# Patient Record
Sex: FEMALE | Race: WHITE | NOT HISPANIC OR LATINO | Employment: OTHER | URBAN - METROPOLITAN AREA
[De-identification: names, ages, dates, MRNs, and addresses within clinical notes are randomized per-mention and may not be internally consistent; named-entity substitution may affect disease eponyms.]

---

## 2017-03-03 ENCOUNTER — OFFICE VISIT (OUTPATIENT)
Dept: FAMILY MEDICINE CLINIC | Facility: CLINIC | Age: 67
End: 2017-03-03

## 2017-03-03 VITALS
HEIGHT: 61 IN | OXYGEN SATURATION: 98 % | HEART RATE: 65 BPM | SYSTOLIC BLOOD PRESSURE: 118 MMHG | BODY MASS INDEX: 41.16 KG/M2 | WEIGHT: 218 LBS | DIASTOLIC BLOOD PRESSURE: 72 MMHG | TEMPERATURE: 98.3 F

## 2017-03-03 DIAGNOSIS — M15.9 GENERALIZED OSTEOARTHROSIS, INVOLVING MULTIPLE SITES: ICD-10-CM

## 2017-03-03 DIAGNOSIS — K21.9 GASTROESOPHAGEAL REFLUX DISEASE, ESOPHAGITIS PRESENCE NOT SPECIFIED: ICD-10-CM

## 2017-03-03 DIAGNOSIS — M54.41 CHRONIC BILATERAL LOW BACK PAIN WITH BILATERAL SCIATICA: Primary | ICD-10-CM

## 2017-03-03 DIAGNOSIS — M54.42 CHRONIC BILATERAL LOW BACK PAIN WITH BILATERAL SCIATICA: Primary | ICD-10-CM

## 2017-03-03 DIAGNOSIS — G89.29 CHRONIC BILATERAL LOW BACK PAIN WITH BILATERAL SCIATICA: Primary | ICD-10-CM

## 2017-03-03 DIAGNOSIS — Z71.84 TRAVEL ADVICE ENCOUNTER: ICD-10-CM

## 2017-03-03 PROCEDURE — 99213 OFFICE O/P EST LOW 20 MIN: CPT | Performed by: FAMILY MEDICINE

## 2017-03-03 RX ORDER — LIDOCAINE 50 MG/G
1 PATCH TOPICAL EVERY 24 HOURS
Qty: 90 PATCH | Refills: 3 | Status: SHIPPED | OUTPATIENT
Start: 2017-03-03 | End: 2017-08-10 | Stop reason: SDUPTHER

## 2017-03-03 RX ORDER — SCOLOPAMINE TRANSDERMAL SYSTEM 1 MG/1
1 PATCH, EXTENDED RELEASE TRANSDERMAL
Qty: 4 PATCH | Refills: 0 | Status: SHIPPED | OUTPATIENT
Start: 2017-03-03 | End: 2019-04-18

## 2017-03-03 RX ORDER — TIZANIDINE HYDROCHLORIDE 2 MG/1
2 CAPSULE, GELATIN COATED ORAL 3 TIMES DAILY
Qty: 270 CAPSULE | Refills: 3 | Status: SHIPPED | OUTPATIENT
Start: 2017-03-03 | End: 2018-03-03

## 2017-03-03 RX ORDER — METHYLPREDNISOLONE 4 MG/1
TABLET ORAL
Qty: 21 TABLET | Refills: 0 | Status: SHIPPED | OUTPATIENT
Start: 2017-03-03 | End: 2017-05-05 | Stop reason: SDUPTHER

## 2017-03-03 RX ORDER — TRAMADOL HYDROCHLORIDE 50 MG/1
50 TABLET ORAL 2 TIMES DAILY
Qty: 180 TABLET | Refills: 1 | Status: SHIPPED | OUTPATIENT
Start: 2017-03-03 | End: 2017-03-06 | Stop reason: SDUPTHER

## 2017-03-03 RX ORDER — INFLUENZA A VIRUS A/MICHIGAN/45/2015 X-275 (H1N1) ANTIGEN (FORMALDEHYDE INACTIVATED), INFLUENZA A VIRUS A/SINGAPORE/INFIMH-16-0019/2016 IVR-186 (H3N2) ANTIGEN (FORMALDEHYDE INACTIVATED), AND INFLUENZA B VIRUS B/MARYLAND/15/2016 BX-69A (A B/COLORADO/6/2017-LIKE VIRUS) ANTIGEN (FORMALDEHYDE INACTIVATED) 60; 60; 60 UG/.5ML; UG/.5ML; UG/.5ML
INJECTION, SUSPENSION INTRAMUSCULAR
Refills: 0 | COMMUNITY
Start: 2016-12-23 | End: 2019-04-18

## 2017-03-03 RX ORDER — PNEUMOCOCCAL 13-VALENT CONJUGATE VACCINE 2.2; 2.2; 2.2; 2.2; 2.2; 4.4; 2.2; 2.2; 2.2; 2.2; 2.2; 2.2; 2.2 UG/.5ML; UG/.5ML; UG/.5ML; UG/.5ML; UG/.5ML; UG/.5ML; UG/.5ML; UG/.5ML; UG/.5ML; UG/.5ML; UG/.5ML; UG/.5ML; UG/.5ML
INJECTION, SUSPENSION INTRAMUSCULAR
Refills: 0 | COMMUNITY
Start: 2017-02-03 | End: 2019-04-18

## 2017-03-03 RX ORDER — TRAMADOL HYDROCHLORIDE 50 MG/1
50 TABLET ORAL 2 TIMES DAILY
Qty: 180 TABLET | Refills: 1 | Status: SHIPPED | OUTPATIENT
Start: 2017-03-03 | End: 2017-03-03 | Stop reason: SDUPTHER

## 2017-03-03 RX ORDER — MECLIZINE HYDROCHLORIDE 25 MG/1
25 TABLET ORAL 3 TIMES DAILY PRN
Qty: 30 TABLET | Refills: 0 | Status: SHIPPED | OUTPATIENT
Start: 2017-03-03 | End: 2017-03-15 | Stop reason: SDUPTHER

## 2017-03-03 NOTE — PROGRESS NOTES
Subjective   Casandra Trinidad is a 66 y.o. female    HPI Comments: Patient is here for recheck regarding her chronic leg and back pain.  She has lumbar spondylosis of the spine and severe degenerative arthritis of her knee.  She needs refill on her medications.  She is very frugal and sensitive to using medication.  She is aware that she is nearing end-stage with her knee and will require a knee replacement.    She is leaving on a cruise in a few weeks and wanted to get medicine for travel/motion sickness and wanted to have a Medrol Dosepak in case her sciatica flares up.    Leg Pain    Pertinent negatives include no numbness.   Dizziness   Associated symptoms include arthralgias. Pertinent negatives include no myalgias, numbness or weakness.       The following portions of the patient's history were reviewed and updated as appropriate: allergies, current medications, past social history and problem list    Review of Systems   Constitutional: Negative.    HENT: Negative.    Eyes: Negative.    Respiratory: Negative.    Cardiovascular: Negative.    Gastrointestinal: Negative.    Musculoskeletal: Positive for arthralgias, back pain and gait problem. Negative for myalgias.   Neurological: Positive for dizziness. Negative for tremors, weakness and numbness.   Psychiatric/Behavioral: Negative for behavioral problems and dysphoric mood. The patient is not nervous/anxious.        Objective     Vitals:    03/03/17 1501   BP: 118/72   Pulse: 65   Temp: 98.3 °F (36.8 °C)   SpO2: 98%       Physical Exam   Constitutional: She is oriented to person, place, and time. She appears well-developed.   obese   HENT:   Head: Normocephalic and atraumatic.   Eyes: Conjunctivae are normal. Pupils are equal, round, and reactive to light.   Cardiovascular: Normal rate and regular rhythm.    Pulmonary/Chest: Effort normal and breath sounds normal.   Abdominal: Soft. Bowel sounds are normal.   Musculoskeletal:        Right knee: She exhibits  decreased range of motion and deformity.        Lumbar back: She exhibits decreased range of motion, tenderness, bony tenderness and pain. She exhibits no swelling, no deformity and no spasm.   Marked knee crepitus   Neurological: She is alert and oriented to person, place, and time. She has normal reflexes.   Skin: Skin is warm and dry.   Psychiatric: She has a normal mood and affect. Her behavior is normal.   Nursing note and vitals reviewed.      Assessment/Plan   Problem List Items Addressed This Visit        Digestive    Esophageal reflux       Musculoskeletal and Integument    Generalized osteoarthrosis, involving multiple sites      Other Visit Diagnoses     Chronic bilateral low back pain with bilateral sciatica    -  Primary    Relevant Medications    traMADol (ULTRAM) 50 MG tablet    TiZANidine (ZANAFLEX) 2 MG capsule    lidocaine (LIDODERM) 5 %    Travel advice encounter        Relevant Medications    Scopolamine (TRANSDERM-SCOP) 1.5 MG/3DAYS patch    meclizine (ANTIVERT) 25 MG tablet    MethylPREDNISolone (MEDROL, KARLI,) 4 MG tablet

## 2017-03-06 DIAGNOSIS — M54.42 CHRONIC BILATERAL LOW BACK PAIN WITH BILATERAL SCIATICA: ICD-10-CM

## 2017-03-06 DIAGNOSIS — G89.29 CHRONIC BILATERAL LOW BACK PAIN WITH BILATERAL SCIATICA: ICD-10-CM

## 2017-03-06 DIAGNOSIS — M54.41 CHRONIC BILATERAL LOW BACK PAIN WITH BILATERAL SCIATICA: ICD-10-CM

## 2017-03-06 RX ORDER — TRAMADOL HYDROCHLORIDE 50 MG/1
50 TABLET ORAL 2 TIMES DAILY
Qty: 180 TABLET | Refills: 1 | Status: SHIPPED | OUTPATIENT
Start: 2017-03-06 | End: 2017-08-10 | Stop reason: SDUPTHER

## 2017-03-13 ENCOUNTER — TELEPHONE (OUTPATIENT)
Dept: FAMILY MEDICINE CLINIC | Facility: CLINIC | Age: 67
End: 2017-03-13

## 2017-03-13 NOTE — TELEPHONE ENCOUNTER
----- Message from Judi Valdez sent at 3/13/2017  8:37 AM EDT -----  Contact: PT.  PT. SAW DR. ARROYO OVER 1 WEEK AGO.  MEDICATION ?:  PT. WAS IN FOR HER 6 MTH. F/U @ LAST APPT.  REFILL NEEDING OF:  TRAMADOL, (PT. DID NOT KNOW HER MG.), 2/DAY, #90 DAY SUPPLY; W/1 REFILL.  MAIL ORDER THRU RX:  [facility].  THIS WAS JUST FORGOTTON TO BE ADDED WHEN ALL REFILLS WERE SUBMITTED.  PT. CAN BE REACHED @ CELL PHONE #: [phone].

## 2017-03-15 ENCOUNTER — TELEPHONE (OUTPATIENT)
Dept: FAMILY MEDICINE CLINIC | Facility: CLINIC | Age: 67
End: 2017-03-15

## 2017-03-15 DIAGNOSIS — Z71.84 TRAVEL ADVICE ENCOUNTER: ICD-10-CM

## 2017-03-15 RX ORDER — MECLIZINE HYDROCHLORIDE 25 MG/1
25 TABLET ORAL 3 TIMES DAILY PRN
Qty: 90 TABLET | Refills: 0 | Status: SHIPPED | OUTPATIENT
Start: 2017-03-15 | End: 2017-05-05 | Stop reason: SDUPTHER

## 2017-03-15 NOTE — TELEPHONE ENCOUNTER
----- Message from Mayuri Riggs sent at 3/15/2017 10:00 AM EDT -----    Pt saw Dr. CORONEL a couple weeks ago.    He tried her on meclizine for 2 weeks and it's really worked well.  She's wanting to know if she can get a new rx sent to her mail order pharm?  90 day supply please.  thanks

## 2017-05-05 DIAGNOSIS — Z71.84 TRAVEL ADVICE ENCOUNTER: ICD-10-CM

## 2017-05-05 RX ORDER — MECLIZINE HYDROCHLORIDE 25 MG/1
TABLET ORAL
Qty: 90 TABLET | Refills: 0 | Status: SHIPPED | OUTPATIENT
Start: 2017-05-05 | End: 2017-05-11 | Stop reason: SDUPTHER

## 2017-05-05 RX ORDER — METHYLPREDNISOLONE 4 MG/1
TABLET ORAL
Qty: 21 TABLET | Refills: 0 | Status: SHIPPED | OUTPATIENT
Start: 2017-05-05 | End: 2017-11-30

## 2017-05-11 DIAGNOSIS — Z71.84 TRAVEL ADVICE ENCOUNTER: ICD-10-CM

## 2017-05-11 RX ORDER — MECLIZINE HYDROCHLORIDE 25 MG/1
25 TABLET ORAL 3 TIMES DAILY PRN
Qty: 90 TABLET | Refills: 0 | Status: SHIPPED | OUTPATIENT
Start: 2017-05-11 | End: 2017-08-10 | Stop reason: SDUPTHER

## 2017-05-12 ENCOUNTER — TELEPHONE (OUTPATIENT)
Dept: FAMILY MEDICINE CLINIC | Facility: CLINIC | Age: 67
End: 2017-05-12

## 2017-06-03 DIAGNOSIS — Z71.84 TRAVEL ADVICE ENCOUNTER: ICD-10-CM

## 2017-06-06 RX ORDER — MECLIZINE HYDROCHLORIDE 25 MG/1
TABLET ORAL
Qty: 90 TABLET | OUTPATIENT
Start: 2017-06-06

## 2017-06-06 RX ORDER — METHYLPREDNISOLONE 4 MG/1
TABLET ORAL
Qty: 21 TABLET | OUTPATIENT
Start: 2017-06-06

## 2017-08-10 ENCOUNTER — OFFICE VISIT (OUTPATIENT)
Dept: FAMILY MEDICINE CLINIC | Facility: CLINIC | Age: 67
End: 2017-08-10

## 2017-08-10 VITALS
TEMPERATURE: 98 F | HEART RATE: 66 BPM | SYSTOLIC BLOOD PRESSURE: 96 MMHG | HEIGHT: 61 IN | BODY MASS INDEX: 41.72 KG/M2 | OXYGEN SATURATION: 97 % | DIASTOLIC BLOOD PRESSURE: 58 MMHG | WEIGHT: 221 LBS

## 2017-08-10 DIAGNOSIS — M54.41 CHRONIC BILATERAL LOW BACK PAIN WITH BILATERAL SCIATICA: Primary | ICD-10-CM

## 2017-08-10 DIAGNOSIS — F41.1 GAD (GENERALIZED ANXIETY DISORDER): ICD-10-CM

## 2017-08-10 DIAGNOSIS — G89.29 CHRONIC BILATERAL LOW BACK PAIN WITH BILATERAL SCIATICA: Primary | ICD-10-CM

## 2017-08-10 DIAGNOSIS — M15.9 GENERALIZED OSTEOARTHROSIS, INVOLVING MULTIPLE SITES: ICD-10-CM

## 2017-08-10 DIAGNOSIS — K21.9 GASTROESOPHAGEAL REFLUX DISEASE, ESOPHAGITIS PRESENCE NOT SPECIFIED: ICD-10-CM

## 2017-08-10 DIAGNOSIS — R42 DIZZINESS: ICD-10-CM

## 2017-08-10 DIAGNOSIS — M54.42 CHRONIC BILATERAL LOW BACK PAIN WITH BILATERAL SCIATICA: Primary | ICD-10-CM

## 2017-08-10 PROCEDURE — 99214 OFFICE O/P EST MOD 30 MIN: CPT | Performed by: FAMILY MEDICINE

## 2017-08-10 RX ORDER — MECLIZINE HYDROCHLORIDE 25 MG/1
25 TABLET ORAL 3 TIMES DAILY PRN
Qty: 90 TABLET | Refills: 3 | Status: SHIPPED | OUTPATIENT
Start: 2017-08-10 | End: 2018-09-05 | Stop reason: SDUPTHER

## 2017-08-10 RX ORDER — LIDOCAINE 50 MG/G
1 PATCH TOPICAL EVERY 24 HOURS
Qty: 90 PATCH | Refills: 3 | Status: SHIPPED | OUTPATIENT
Start: 2017-08-10 | End: 2018-02-26 | Stop reason: SDUPTHER

## 2017-08-10 RX ORDER — TRAMADOL HYDROCHLORIDE 50 MG/1
50 TABLET ORAL 2 TIMES DAILY
Qty: 180 TABLET | Refills: 1 | Status: SHIPPED | OUTPATIENT
Start: 2017-08-10 | End: 2017-08-17 | Stop reason: SDUPTHER

## 2017-08-11 NOTE — PROGRESS NOTES
Subjective   Casandra Trinidad is a 67 y.o. female    HPI Comments: Patient presents for 6 month recheck regarding chronic back pain associated with lumbar spondylosis and degenerative disc disease.  She is due for refill on her tramadol.  Her Timur is reviewed and is appropriate.  Also follow-up regarding generalized osteoarthritis, dizziness, anxiety and GERD.  She is requesting refills also for her meclizine and Lidoderm patches.  All of these prescriptions are sent to her mail-order pharmacy.  She denies any significant changes in her back pain pattern but reports that her pain seems to be more persistent.  She agrees that her weight issue is aggravating her back pain.  She denies any changes in bowel or bladder function.  She has no acute medical issues today.    Back Pain   Pertinent negatives include no abdominal pain, chest pain, dysuria, headaches, numbness or weakness.   Dizziness   Pertinent negatives include no abdominal pain, arthralgias, chest pain, fatigue, headaches, myalgias, nausea, numbness or weakness.   Arthritis   Pertinent negatives include no diarrhea, dysuria or fatigue.       The following portions of the patient's history were reviewed and updated as appropriate: allergies, current medications, past social history and problem list    Review of Systems   Constitutional: Negative.  Negative for appetite change, fatigue and unexpected weight change.   HENT: Negative.    Eyes: Negative.    Respiratory: Negative.  Negative for chest tightness and shortness of breath.    Cardiovascular: Negative for chest pain.   Gastrointestinal: Negative.  Negative for abdominal distention, abdominal pain, diarrhea and nausea.        Patient experiencing heartburn/acid reflux     Endocrine: Negative for polydipsia, polyphagia and polyuria.   Genitourinary: Negative for dysuria and hematuria.   Musculoskeletal: Positive for arthritis and back pain. Negative for arthralgias, gait problem and myalgias.   Skin:  Negative.    Neurological: Positive for dizziness. Negative for tremors, weakness, light-headedness, numbness and headaches.   Psychiatric/Behavioral: Positive for dysphoric mood and sleep disturbance. Negative for agitation, behavioral problems, confusion, decreased concentration and suicidal ideas. The patient is nervous/anxious.        Objective     Vitals:    08/10/17 0812   BP: 96/58   Pulse: 66   Temp: 98 °F (36.7 °C)   SpO2: 97%       Physical Exam   Constitutional: She is oriented to person, place, and time. She appears well-developed and well-nourished.   HENT:   Head: Normocephalic.   Mouth/Throat: Oropharynx is clear and moist.   Eyes: Conjunctivae are normal. Pupils are equal, round, and reactive to light.   Neck: Neck supple. No thyromegaly present.   Cardiovascular: Normal rate and regular rhythm.    Pulmonary/Chest: Effort normal and breath sounds normal.   Abdominal: Soft. Bowel sounds are normal. There is no tenderness.   Musculoskeletal:        Lumbar back: She exhibits decreased range of motion, tenderness, bony tenderness and pain. She exhibits no swelling, no deformity and no spasm.   Lymphadenopathy:     She has no cervical adenopathy.   Neurological: She is alert and oriented to person, place, and time. She has normal reflexes.   Skin: Skin is warm and dry. No rash noted.   Psychiatric: She has a normal mood and affect. Her behavior is normal.   Nursing note and vitals reviewed.      Assessment/Plan   Problem List Items Addressed This Visit        Digestive    Esophageal reflux       Musculoskeletal and Integument    Generalized osteoarthrosis, involving multiple sites       Other    CLAUDIA (generalized anxiety disorder)      Other Visit Diagnoses     Chronic bilateral low back pain with bilateral sciatica    -  Primary    Relevant Medications    traMADol (ULTRAM) 50 MG tablet    lidocaine (LIDODERM) 5 %    Dizziness        Relevant Medications    meclizine (ANTIVERT) 25 MG tablet

## 2017-08-17 ENCOUNTER — TELEPHONE (OUTPATIENT)
Dept: FAMILY MEDICINE CLINIC | Facility: CLINIC | Age: 67
End: 2017-08-17

## 2017-08-17 DIAGNOSIS — M54.42 CHRONIC BILATERAL LOW BACK PAIN WITH BILATERAL SCIATICA: ICD-10-CM

## 2017-08-17 DIAGNOSIS — G89.29 CHRONIC BILATERAL LOW BACK PAIN WITH BILATERAL SCIATICA: ICD-10-CM

## 2017-08-17 DIAGNOSIS — M54.41 CHRONIC BILATERAL LOW BACK PAIN WITH BILATERAL SCIATICA: ICD-10-CM

## 2017-08-17 RX ORDER — TRAMADOL HYDROCHLORIDE 50 MG/1
50 TABLET ORAL 2 TIMES DAILY
Qty: 60 TABLET | Refills: 0 | Status: SHIPPED | OUTPATIENT
Start: 2017-08-17 | End: 2018-02-26 | Stop reason: SDUPTHER

## 2017-08-17 NOTE — TELEPHONE ENCOUNTER
----- Message from Radha Mckeon sent at 8/17/2017 12:18 PM EDT -----  Contact: PATIENT   traMADol (ULTRAM) 50 MG tablet 180 tablet 1 8/10/2017      Sig - Route: Take 1 tablet by mouth 2 (Two) Times a Day. - Oral    THIS NEEDS TO BE CALLED TO [facility] PLEASE. SHE THOUGHT IT GOT SENT ON HER LAST VISIT, BUT THEY TOLD HER THEY DID NOT GET IT.     ALSO CAN HER DAUGHTER  A SCRIPT FOR HER TODAY, JUST ENOUGH TO SO HER FOR COUPLE WEEKS UNTIL THE OTHER IS DELIVERED BY [facility]. PLEASE CALL HER AND LET HER KNOW, DAUGHTER WORKS HERE [facility].

## 2017-08-17 NOTE — TELEPHONE ENCOUNTER
Prescription was given to Caridad to fax[facility] at patient's recent visit??  I will print a new prescription that her daughter can  for her today.

## 2017-08-21 RX ORDER — MELOXICAM 15 MG/1
TABLET ORAL
Qty: 90 TABLET | Refills: 2 | Status: SHIPPED | OUTPATIENT
Start: 2017-08-21 | End: 2018-05-18 | Stop reason: SDUPTHER

## 2017-08-24 ENCOUNTER — TELEPHONE (OUTPATIENT)
Dept: FAMILY MEDICINE CLINIC | Facility: CLINIC | Age: 67
End: 2017-08-24

## 2017-08-24 NOTE — TELEPHONE ENCOUNTER
----- Message from Judi Valdez sent at 8/23/2017  2:06 PM EDT -----  Contact: [facility]MATTHEW ARMENTA:  [phone]  MATTHEW FROM [facility] CALLED, RECEIVED A PRESCRIPTION FOR TRAMADOL, NEEDING TO BE SPOKEN TO VERBALLY FOR COMPLETING THIS MEDICATION REQUEST.  REF. #: [id number].  REACH MATTHEW @ ABOVE #.

## 2017-09-15 RX ORDER — RALOXIFENE HYDROCHLORIDE 60 MG/1
TABLET, FILM COATED ORAL
Qty: 90 TABLET | Refills: 3 | Status: SHIPPED | OUTPATIENT
Start: 2017-09-15 | End: 2019-03-04

## 2017-11-30 ENCOUNTER — OFFICE VISIT (OUTPATIENT)
Dept: FAMILY MEDICINE CLINIC | Facility: CLINIC | Age: 67
End: 2017-11-30

## 2017-11-30 ENCOUNTER — HOSPITAL ENCOUNTER (OUTPATIENT)
Dept: GENERAL RADIOLOGY | Facility: HOSPITAL | Age: 67
Discharge: HOME | End: 2017-11-30
Admitting: FAMILY MEDICINE

## 2017-11-30 VITALS
DIASTOLIC BLOOD PRESSURE: 78 MMHG | BODY MASS INDEX: 43.31 KG/M2 | HEIGHT: 61 IN | SYSTOLIC BLOOD PRESSURE: 122 MMHG | WEIGHT: 229.4 LBS | OXYGEN SATURATION: 98 % | HEART RATE: 75 BPM

## 2017-11-30 DIAGNOSIS — M54.41 CHRONIC BILATERAL LOW BACK PAIN WITH BILATERAL SCIATICA: ICD-10-CM

## 2017-11-30 DIAGNOSIS — Z00.00 INITIAL MEDICARE ANNUAL WELLNESS VISIT: Primary | ICD-10-CM

## 2017-11-30 DIAGNOSIS — M54.42 CHRONIC BILATERAL LOW BACK PAIN WITH BILATERAL SCIATICA: ICD-10-CM

## 2017-11-30 DIAGNOSIS — G89.29 CHRONIC BILATERAL LOW BACK PAIN WITH BILATERAL SCIATICA: ICD-10-CM

## 2017-11-30 PROCEDURE — 72114 X-RAY EXAM L-S SPINE BENDING: CPT

## 2017-11-30 PROCEDURE — G0438 PPPS, INITIAL VISIT: HCPCS | Performed by: FAMILY MEDICINE

## 2017-11-30 RX ORDER — PNEUMOCOCCAL VACCINE POLYVALENT 25; 25; 25; 25; 25; 25; 25; 25; 25; 25; 25; 25; 25; 25; 25; 25; 25; 25; 25; 25; 25; 25; 25 UG/.5ML; UG/.5ML; UG/.5ML; UG/.5ML; UG/.5ML; UG/.5ML; UG/.5ML; UG/.5ML; UG/.5ML; UG/.5ML; UG/.5ML; UG/.5ML; UG/.5ML; UG/.5ML; UG/.5ML; UG/.5ML; UG/.5ML; UG/.5ML; UG/.5ML; UG/.5ML; UG/.5ML; UG/.5ML; UG/.5ML
INJECTION, SOLUTION INTRAMUSCULAR; SUBCUTANEOUS
Refills: 0 | COMMUNITY
Start: 2017-10-27 | End: 2019-04-18

## 2017-11-30 RX ORDER — INFLUENZA VACCINE, ADJUVANTED 15; 15; 15 UG/.5ML; UG/.5ML; UG/.5ML
INJECTION, SUSPENSION INTRAMUSCULAR
Refills: 0 | COMMUNITY
Start: 2017-10-27 | End: 2019-04-18

## 2017-11-30 NOTE — PROGRESS NOTES
QUICK REFERENCE INFORMATION:  The ABCs of the Annual Wellness Visit    Initial Medicare Wellness Visit    HEALTH RISK ASSESSMENT    1950    Recent Hospitalizations:  No hospitalization(s) within the last year..        Current Medical Providers:  Patient Care Team:  Tony Melton MD as PCP - General (Family Medicine)  Tony Melton MD as PCP - Claims Attributed        Smoking Status:  History   Smoking Status   • Never Smoker   Smokeless Tobacco   • Never Used       Alcohol Consumption:  History   Alcohol Use No       Depression Screen:   PHQ-2/PHQ-9 Depression Screening 11/30/2017   Little interest or pleasure in doing things 0   Feeling down, depressed, or hopeless 0   Total Score 0       Health Habits and Functional and Cognitive Screening:  Functional & Cognitive Status 11/30/2017   Do you have difficulty preparing food and eating? No   Do you have difficulty bathing yourself, getting dressed or grooming yourself? No   Do you have difficulty using the toilet? No   Do you have difficulty moving around from place to place? Yes   Do you have trouble with steps or getting out of a bed or a chair? Yes   In the past year have you fallen or experienced a near fall? Yes   Current Diet Unhealthy Diet   Dental Exam Up to date   Eye Exam Up to date   Exercise (times per week) 5 times per week   Current Exercise Activities Include (No Data)   Do you need help using the phone?  No   Are you deaf or do you have serious difficulty hearing?  No   Do you need help with transportation? No   Do you need help shopping? No   Do you need help preparing meals?  No   Do you need help with housework?  No   Do you need help with laundry? No   Do you need help taking your medications? No   Do you need help managing money? No   Have you felt unusual stress, anger or loneliness in the last month? No   Who do you live with? Spouse   If you need help, do you have trouble finding someone available to you? No    Have you been bothered in the last four weeks by sexual problems? No           Does the patient have evidence of cognitive impairment? No    Asiprin use counseling: Taking ASA appropriately as indicated      Recent Lab Results:    Visual Acuity:  No exam data present    Age-appropriate Screening Schedule:  Refer to the list below for future screening recommendations based on patient's age, sex and/or medical conditions. Orders for these recommended tests are listed in the plan section. The patient has been provided with a written plan.    Health Maintenance   Topic Date Due   • TDAP/TD VACCINES (1 - Tdap) 03/31/1969   • COLONOSCOPY  06/21/2016   • PNEUMOCOCCAL VACCINES (65+ LOW/MEDIUM RISK) (2 of 2 - PCV13) 10/27/2018   • MAMMOGRAM  12/23/2018   • INFLUENZA VACCINE  Completed   • ZOSTER VACCINE  Completed        Subjective   History of Present Illness : Increase back pain with increased left lower extremity pain.  History of spinal stenosis.  Previous x-[facility] at [facility] many years ago.    Casandra Trinidad is a 67 y.o. female who presents for an Annual Wellness Visit.    The following portions of the patient's history were reviewed and updated as appropriate: allergies, current medications, past family history, past medical history, past social history, past surgical history and problem list.    Outpatient Medications Prior to Visit   Medication Sig Dispense Refill   • aspirin 81 MG EC tablet Take 81 mg by mouth daily.     • BOOSTRIX 5-2.5-18.5 injection inject 0.5 milliliter intramuscularly  0   • Calcium Carbonate-Vitamin D (CALCIUM 600+D) 600-200 MG-UNIT tablet Take 1 tablet by mouth 2 (two) times a day.     • FLUZONE HIGH-DOSE 0.5 ML suspension prefilled syringe injection inject 0.5 milliliter intramuscularly  0   • lidocaine (LIDODERM) 5 % Place 1 patch on the skin Daily. Remove & Discard patch within 12 hours or as directed by MD 90 patch 3   • meclizine (ANTIVERT) 25 MG tablet Take 1 tablet by mouth  "3 (Three) Times a Day As Needed for dizziness. 90 tablet 3   • meloxicam (MOBIC) 15 MG tablet TAKE 1 TABLET DAILY 90 tablet 2   • Multiple Vitamins-Minerals (ICAPS AREDS 2 PO) Take 1 capsule by mouth every night.     • omeprazole (PriLOSEC) 20 MG capsule TAKE 1 CAPSULE DAILY 90 capsule 4   • PREVNAR 13 vaccine inject 0.5 milliliter intramuscularly  0   • raloxifene (EVISTA) 60 MG tablet TAKE 1 TABLET DAILY 90 tablet 3   • Scopolamine (TRANSDERM-SCOP) 1.5 MG/3DAYS patch Place 1 patch on the skin Every 72 (Seventy-Two) Hours. 4 patch 0   • sertraline (ZOLOFT) 25 MG tablet Take 1 tablet by mouth daily. 90 tablet 3   • TiZANidine (ZANAFLEX) 2 MG capsule Take 1 capsule by mouth 3 (Three) Times a Day. 270 capsule 3   • traMADol (ULTRAM) 50 MG tablet Take 1 tablet by mouth 2 (Two) Times a Day. 60 tablet 0   • MethylPREDNISolone (MEDROL, KARLI,) 4 MG tablet TAKE AS DIRECTED BY PRESCRIBER OR PACKAGE INSTRUCTIONS 21 tablet 0     No facility-administered medications prior to visit.        Patient Active Problem List   Diagnosis   • Menopause   • Generalized osteoarthrosis, involving multiple sites   • Esophageal reflux   • CLAUDIA (generalized anxiety disorder)       Advance Care Planning:  has an advance directive - a copy HAS NOT been provided. Have asked the patient to send this to us to add to record.    Identification of Risk Factors:  Risk factors include: weight  and chronic pain.    Review of Systems    Compared to one year ago, the patient feels her physical health is worse.  Compared to one year ago, the patient feels her mental health is worse.    Objective     Physical Exam : Musculoskeletal: Generalized tenderness in lumbosacral region.  Positive straight leg raising test bilaterally.  Able to stand on toes.    Vitals:    11/30/17 0814   BP: 122/78   Pulse: 75   SpO2: 98%   Weight: 229 lb 6.4 oz (104 kg)   Height: 61\" (154.9 cm)   PainSc:   4       Body mass index is 43.34 kg/(m^2).  Discussed the patient's BMI with " her. The BMI is above average; BMI management plan is completed.    Assessment/Plan   Patient Self-Management and Personalized Health Advice  The patient has been provided with information about: weight management, prevention of cardiac or vascular disease and designing advance directives and preventive services including:   · Counseling for cardiovascular disease risk reduction, Exercise counseling provided.    Visit Diagnoses:    ICD-10-CM ICD-9-CM   1. Initial Medicare annual wellness visit Z00.00 V70.0   2. Chronic bilateral low back pain with bilateral sciatica M54.42 724.2    M54.41 724.3    G89.29 338.29       Orders Placed This Encounter   Procedures   • XR Spine Lumbar Complete With Flex & Ext     Order Specific Question:   Reason for Exam:     Answer:   back pain with radicular pain in left leg       Outpatient Encounter Prescriptions as of 11/30/2017   Medication Sig Dispense Refill   • aspirin 81 MG EC tablet Take 81 mg by mouth daily.     • BOOSTRIX 5-2.5-18.5 injection inject 0.5 milliliter intramuscularly  0   • Calcium Carbonate-Vitamin D (CALCIUM 600+D) 600-200 MG-UNIT tablet Take 1 tablet by mouth 2 (two) times a day.     • FLUAD 0.5 ML suspension prefilled syringe inject 0.5 milliliter intramuscularly  0   • FLUZONE HIGH-DOSE 0.5 ML suspension prefilled syringe injection inject 0.5 milliliter intramuscularly  0   • lidocaine (LIDODERM) 5 % Place 1 patch on the skin Daily. Remove & Discard patch within 12 hours or as directed by MD 90 patch 3   • meclizine (ANTIVERT) 25 MG tablet Take 1 tablet by mouth 3 (Three) Times a Day As Needed for dizziness. 90 tablet 3   • meloxicam (MOBIC) 15 MG tablet TAKE 1 TABLET DAILY 90 tablet 2   • Multiple Vitamins-Minerals (ICAPS AREDS 2 PO) Take 1 capsule by mouth every night.     • omeprazole (PriLOSEC) 20 MG capsule TAKE 1 CAPSULE DAILY 90 capsule 4   • PNEUMOVAX 23 25 MCG/0.5ML vaccine inject 0.5 milliliter intramuscularly  0   • PREVNAR 13 vaccine inject 0.5  milliliter intramuscularly  0   • raloxifene (EVISTA) 60 MG tablet TAKE 1 TABLET DAILY 90 tablet 3   • Scopolamine (TRANSDERM-SCOP) 1.5 MG/3DAYS patch Place 1 patch on the skin Every 72 (Seventy-Two) Hours. 4 patch 0   • sertraline (ZOLOFT) 25 MG tablet Take 1 tablet by mouth daily. 90 tablet 3   • TiZANidine (ZANAFLEX) 2 MG capsule Take 1 capsule by mouth 3 (Three) Times a Day. 270 capsule 3   • traMADol (ULTRAM) 50 MG tablet Take 1 tablet by mouth 2 (Two) Times a Day. 60 tablet 0   • [DISCONTINUED] MethylPREDNISolone (MEDROL, KARLI,) 4 MG tablet TAKE AS DIRECTED BY PRESCRIBER OR PACKAGE INSTRUCTIONS 21 tablet 0     No facility-administered encounter medications on file as of 11/30/2017.        Reviewed use of high risk medication in the elderly: not applicable  Reviewed for potential of harmful drug interactions in the elderly: not applicable    Follow Up:  Return in about 1 year (around 11/30/2018) for Medicare Wellness.     An After Visit Summary and PPPS with all of these plans were given to the patient.

## 2017-12-15 RX ORDER — SERTRALINE HYDROCHLORIDE 25 MG/1
TABLET, FILM COATED ORAL
Qty: 90 TABLET | Refills: 3 | Status: SHIPPED | OUTPATIENT
Start: 2017-12-15 | End: 2019-03-04 | Stop reason: SDUPTHER

## 2018-01-03 ENCOUNTER — TELEPHONE (OUTPATIENT)
Dept: FAMILY MEDICINE CLINIC | Facility: CLINIC | Age: 68
End: 2018-01-03

## 2018-01-03 DIAGNOSIS — M51.36 DEGENERATIVE DISC DISEASE, LUMBAR: ICD-10-CM

## 2018-01-03 DIAGNOSIS — M43.16 SPONDYLOLISTHESIS OF LUMBAR REGION: Primary | ICD-10-CM

## 2018-01-03 NOTE — TELEPHONE ENCOUNTER
----- Message from Risa Lawrence sent at 1/3/2018  3:01 PM EST -----  Patient needs an MRI lumbar. Patient stated that she had an xray lumbar and she received a letter from Dr.Van Sprague stating that there is arthritis, degenerative disc disease, a likely old compression fracture and lumbar  instability with some spondylolithsesis (bony slippage) in the lower lumbar area. I can't get her in with a neurosurgeon until she has an MRI. Can Dr.Van Sprague put in an order for MRI Lumbar??  Thanks,  Risa..

## 2018-01-08 ENCOUNTER — HOSPITAL ENCOUNTER (OUTPATIENT)
Dept: MRI IMAGING | Facility: HOSPITAL | Age: 68
Discharge: HOME | End: 2018-01-08
Admitting: FAMILY MEDICINE

## 2018-01-08 DIAGNOSIS — M51.36 DEGENERATIVE DISC DISEASE, LUMBAR: ICD-10-CM

## 2018-01-08 DIAGNOSIS — M43.16 SPONDYLOLISTHESIS OF LUMBAR REGION: ICD-10-CM

## 2018-01-08 PROCEDURE — 72148 MRI LUMBAR SPINE W/O DYE: CPT

## 2018-01-09 ENCOUNTER — TRANSCRIBE ORDERS (OUTPATIENT)
Dept: FAMILY MEDICINE CLINIC | Facility: CLINIC | Age: 68
End: 2018-01-09

## 2018-01-09 DIAGNOSIS — M43.16 SPONDYLOLISTHESIS, LUMBAR REGION: Primary | ICD-10-CM

## 2018-01-09 DIAGNOSIS — M51.36 OTHER INTERVERTEBRAL DISC DEGENERATION, LUMBAR REGION: ICD-10-CM

## 2018-02-26 ENCOUNTER — OFFICE VISIT (OUTPATIENT)
Dept: NEUROSURGERY | Facility: CLINIC | Age: 68
End: 2018-02-26

## 2018-02-26 ENCOUNTER — OFFICE VISIT (OUTPATIENT)
Dept: FAMILY MEDICINE CLINIC | Facility: CLINIC | Age: 68
End: 2018-02-26

## 2018-02-26 VITALS
WEIGHT: 230 LBS | SYSTOLIC BLOOD PRESSURE: 132 MMHG | TEMPERATURE: 98 F | DIASTOLIC BLOOD PRESSURE: 86 MMHG | HEART RATE: 69 BPM | OXYGEN SATURATION: 97 % | BODY MASS INDEX: 43.43 KG/M2 | HEIGHT: 61 IN

## 2018-02-26 VITALS
TEMPERATURE: 98.3 F | SYSTOLIC BLOOD PRESSURE: 138 MMHG | HEART RATE: 78 BPM | HEIGHT: 61 IN | WEIGHT: 231 LBS | OXYGEN SATURATION: 95 % | DIASTOLIC BLOOD PRESSURE: 84 MMHG | BODY MASS INDEX: 43.61 KG/M2

## 2018-02-26 DIAGNOSIS — M43.10 ACQUIRED SPONDYLOLISTHESIS: ICD-10-CM

## 2018-02-26 DIAGNOSIS — M48.062 SPINAL STENOSIS OF LUMBAR REGION WITH NEUROGENIC CLAUDICATION: ICD-10-CM

## 2018-02-26 DIAGNOSIS — M51.36 DEGENERATIVE DISC DISEASE, LUMBAR: ICD-10-CM

## 2018-02-26 DIAGNOSIS — G89.29 CHRONIC BILATERAL LOW BACK PAIN WITHOUT SCIATICA: Primary | ICD-10-CM

## 2018-02-26 DIAGNOSIS — K21.9 GASTROESOPHAGEAL REFLUX DISEASE, ESOPHAGITIS PRESENCE NOT SPECIFIED: ICD-10-CM

## 2018-02-26 DIAGNOSIS — M54.50 CHRONIC BILATERAL LOW BACK PAIN WITHOUT SCIATICA: Primary | ICD-10-CM

## 2018-02-26 DIAGNOSIS — M48.061 SPINAL STENOSIS, LUMBAR REGION, WITHOUT NEUROGENIC CLAUDICATION: ICD-10-CM

## 2018-02-26 DIAGNOSIS — M47.817 LUMBOSACRAL SPONDYLOSIS WITHOUT MYELOPATHY: ICD-10-CM

## 2018-02-26 DIAGNOSIS — M54.41 CHRONIC BILATERAL LOW BACK PAIN WITH BILATERAL SCIATICA: Primary | ICD-10-CM

## 2018-02-26 DIAGNOSIS — E66.01 OBESITY, CLASS III, BMI 40-49.9 (MORBID OBESITY) (HCC): ICD-10-CM

## 2018-02-26 DIAGNOSIS — G89.29 CHRONIC BILATERAL LOW BACK PAIN WITH BILATERAL SCIATICA: Primary | ICD-10-CM

## 2018-02-26 DIAGNOSIS — M54.42 CHRONIC BILATERAL LOW BACK PAIN WITH BILATERAL SCIATICA: Primary | ICD-10-CM

## 2018-02-26 PROCEDURE — 99204 OFFICE O/P NEW MOD 45 MIN: CPT | Performed by: NEUROLOGICAL SURGERY

## 2018-02-26 PROCEDURE — 99213 OFFICE O/P EST LOW 20 MIN: CPT | Performed by: FAMILY MEDICINE

## 2018-02-26 RX ORDER — OMEPRAZOLE 20 MG/1
20 CAPSULE, DELAYED RELEASE ORAL DAILY
Qty: 90 CAPSULE | Refills: 3 | Status: SHIPPED | OUTPATIENT
Start: 2018-02-26 | End: 2018-09-05 | Stop reason: SDUPTHER

## 2018-02-26 RX ORDER — TRAMADOL HYDROCHLORIDE 50 MG/1
50 TABLET ORAL 2 TIMES DAILY PRN
Qty: 60 TABLET | Refills: 5 | Status: SHIPPED | OUTPATIENT
Start: 2018-02-26 | End: 2018-02-26 | Stop reason: SDUPTHER

## 2018-02-26 RX ORDER — TRAMADOL HYDROCHLORIDE 50 MG/1
50 TABLET ORAL 2 TIMES DAILY PRN
Qty: 180 TABLET | Refills: 3 | Status: SHIPPED | OUTPATIENT
Start: 2018-02-26 | End: 2018-09-05 | Stop reason: SDUPTHER

## 2018-02-26 RX ORDER — LIDOCAINE 50 MG/G
1 PATCH TOPICAL EVERY 24 HOURS
Qty: 90 PATCH | Refills: 3 | Status: SHIPPED | OUTPATIENT
Start: 2018-02-26 | End: 2019-02-25 | Stop reason: SDUPTHER

## 2018-02-26 NOTE — PROGRESS NOTES
Subjective   Casandra Trinidad is a 67 y.o. female    HPI Comments: Patient presents today for recheck regarding chronic back pain.  Relatively recent MRI revealed significant spinal stenosis and degenerative disc changes.  She had an appointment earlier today with Dr. Funez in neurosurgery.  His record is reviewed.  He is recommending she get her knee fixed so she can exercise and lose weight before needing back surgery.  She is here today primarily for prescription refills.  She also needs a refill for her Prilosec for GERD.  [facility]. She needs refill on her tramadol and lidocaine patches also.    Back Pain   Pertinent negatives include no abdominal pain, chest pain, dysuria, headaches, numbness or weakness.   Heartburn   She reports no abdominal pain, no chest pain or no nausea. Pertinent negatives include no fatigue.       The following portions of the patient's history were reviewed and updated as appropriate: allergies, current medications, past social history and problem list    Review of Systems   Constitutional: Negative.  Negative for appetite change, fatigue and unexpected weight change.   HENT: Negative.    Eyes: Negative.    Respiratory: Negative.  Negative for chest tightness and shortness of breath.    Cardiovascular: Negative for chest pain.   Gastrointestinal: Negative.  Negative for abdominal distention, abdominal pain, diarrhea and nausea.        Patient experiencing heartburn/acid reflux     Endocrine: Negative for polydipsia, polyphagia and polyuria.   Genitourinary: Negative for dysuria and hematuria.   Musculoskeletal: Positive for back pain. Negative for arthralgias, gait problem and myalgias.   Skin: Negative.    Neurological: Positive for dizziness. Negative for tremors, weakness, light-headedness, numbness and headaches.   Psychiatric/Behavioral: Positive for dysphoric mood and sleep disturbance. Negative for agitation, behavioral problems,  confusion, decreased concentration and suicidal ideas. The patient is nervous/anxious.        Objective     Vitals:    02/26/18 0901   BP: 132/86   Pulse: 69   Temp: 98 °F (36.7 °C)   SpO2: 97%       Physical Exam   Constitutional: She is oriented to person, place, and time. She appears well-developed and well-nourished.   HENT:   Head: Normocephalic.   Mouth/Throat: Oropharynx is clear and moist.   Eyes: Conjunctivae are normal. Pupils are equal, round, and reactive to light.   Neck: Neck supple. No thyromegaly present.   Cardiovascular: Normal rate and regular rhythm.    Pulmonary/Chest: Effort normal and breath sounds normal.   Abdominal: Soft. Bowel sounds are normal. There is no tenderness.   Musculoskeletal:        Lumbar back: She exhibits decreased range of motion, tenderness, bony tenderness and pain. She exhibits no swelling, no deformity and no spasm.   Lymphadenopathy:     She has no cervical adenopathy.   Neurological: She is alert and oriented to person, place, and time. She has normal reflexes.   Skin: Skin is warm and dry. No rash noted.   Psychiatric: She has a normal mood and affect. Her behavior is normal.   Nursing note and vitals reviewed.      Assessment/Plan   Problem List Items Addressed This Visit        Digestive    Esophageal reflux    Relevant Medications    omeprazole (priLOSEC) 20 MG capsule      Other Visit Diagnoses     Chronic bilateral low back pain with bilateral sciatica    -  Primary    Relevant Medications    lidocaine (LIDODERM) 5 %    traMADol (ULTRAM) 50 MG tablet    Spinal stenosis of lumbar region with neurogenic claudication        Degenerative disc disease, lumbar

## 2018-02-26 NOTE — PROGRESS NOTES
Patient: Casandra Trinidad  :  1950  Chart #:  [id number]    Date of Service: 18    Chief Complaint:   Chief Complaint   Patient presents with   • Back Pain       Back Pain   This is a new (Mrs. Trinidad is new with me today.  She is a pleasant 67 year old female who presents today with back and left lower extremity pain.) problem. The problem occurs intermittently (She complains of left buttock pain.). The problem has been gradually worsening since onset. The pain is present in the lumbar spine and gluteal. Quality: She complains of weakness in her left lower extremity. The pain radiates to the left thigh and left foot (She has pain that runs down her left lower extremity to the foot.). The pain is at a severity of 5/10 (She has normal sensation in her feet bilaterally.  She denies pain when her lower extremities are raised.). The pain is mild (She does have some issues with her gait.). The pain is worse during the day. The symptoms are aggravated by position and standing. Stiffness is present in the morning. Associated symptoms include numbness. (She has never had spine surgery;  She is planning a R knee replacement.  Walking is limited by R knee pain;  She does not describe neurogenic claudication.  ) Risk factors include lack of exercise, sedentary lifestyle, obesity and poor posture (Her BMI is 44.18.  ). She has tried NSAIDs, analgesics, muscle relaxant and bed rest for the symptoms. The treatment provided mild relief.     Radiographic Images:   MRI of the lumbar spine dated 18 shows she has grade 1 spondylolisthesis at L4 on L5, and L3 on L4.  She has central spinal stenosis most marked at L3-L4, less severe at L2-L3.  She has lateral recess stenosis on the right at L4-L5.  She has disc degeneration throughout the lumbar region, most marked in the upper lumbar region.  There are no modic changes.         Past Medical History:   Diagnosis Date   • Age related cataract    • Arthropathy, lower  leg    • Dehiscence of incision    • Ear itch    • Myalgia and myositis    • Pain in joint, ankle and foot    • Pain in joint, hand    • Pain in joint, lower leg    • Tenosynovitis of finger    • Vitamin D deficiency      Current Outpatient Prescriptions   Medication Sig Dispense Refill   • aspirin 81 MG EC tablet Take 81 mg by mouth daily.     • BOOSTRIX 5-2.5-18.5 injection inject 0.5 milliliter intramuscularly  0   • Calcium Carbonate-Vitamin D (CALCIUM 600+D) 600-200 MG-UNIT tablet Take 1 tablet by mouth 2 (two) times a day.     • FLUAD 0.5 ML suspension prefilled syringe inject 0.5 milliliter intramuscularly  0   • FLUZONE HIGH-DOSE 0.5 ML suspension prefilled syringe injection inject 0.5 milliliter intramuscularly  0   • lidocaine (LIDODERM) 5 % Place 1 patch on the skin Daily. Remove & Discard patch within 12 hours or as directed by MD 90 patch 3   • meclizine (ANTIVERT) 25 MG tablet Take 1 tablet by mouth 3 (Three) Times a Day As Needed for dizziness. 90 tablet 3   • meloxicam (MOBIC) 15 MG tablet TAKE 1 TABLET DAILY 90 tablet 2   • Multiple Vitamins-Minerals (ICAPS AREDS 2 PO) Take 1 capsule by mouth every night.     • omeprazole (PriLOSEC) 20 MG capsule TAKE 1 CAPSULE DAILY 90 capsule 4   • PNEUMOVAX 23 25 MCG/0.5ML vaccine inject 0.5 milliliter intramuscularly  0   • PREVNAR 13 vaccine inject 0.5 milliliter intramuscularly  0   • raloxifene (EVISTA) 60 MG tablet TAKE 1 TABLET DAILY 90 tablet 3   • Scopolamine (TRANSDERM-SCOP) 1.5 MG/3DAYS patch Place 1 patch on the skin Every 72 (Seventy-Two) Hours. 4 patch 0   • sertraline (ZOLOFT) 25 MG tablet TAKE 1 TABLET DAILY 90 tablet 3   • TiZANidine (ZANAFLEX) 2 MG capsule Take 1 capsule by mouth 3 (Three) Times a Day. 270 capsule 3   • traMADol (ULTRAM) 50 MG tablet Take 1 tablet by mouth 2 (Two) Times a Day. 60 tablet 0     No current facility-administered medications for this visit.       Allergies   Allergen Reactions   • Codeine GI Intolerance   • Diclofenac  "Nausea And Vomiting   • Lortab [Hydrocodone-Acetaminophen] Nausea And Vomiting   • Tyloxapol Nausea And Vomiting     Social History     Social History   • Marital status:      Spouse name: N/A   • Number of children: N/A   • Years of education: N/A     Social History Main Topics   • Smoking status: Never Smoker   • Smokeless tobacco: Never Used   • Alcohol use No   • Drug use: No   • Sexual activity: Not on file     Other Topics Concern   • Not on file     Social History Narrative     Family History   Problem Relation Age of Onset   • COPD Mother    • COPD Father    • COPD Sister      Past Surgical History:   Procedure Laterality Date   • BREAST LUMPECTOMY Right 01/2014     Review of Systems   Musculoskeletal: Positive for back pain.   Neurological: Positive for numbness.   All other systems reviewed and are negative.    Vitals:    02/26/18 0817   BP: 138/84   BP Location: Right arm   Patient Position: Sitting   Pulse: 78   Temp: 98.3 °F (36.8 °C)   TempSrc: Temporal Artery    SpO2: 95%   Weight: 105 kg (231 lb)   Height: 154 cm (60.63\")     Physical Exam  Neurologic Exam  Physical Exam   Constitutional: The patient is oriented to person, place, and time.  The patient appears well-developed and well-nourished and in no distress.   Neat elderly obese o/w healthy female  HENT:   Head: Normocephalic.   Right Ear: Hearing normal.   Left Ear: Hearing normal.   Mouth/Throat: Uvula is midline, oropharynx is clear and moist and mucous membranes are normal.   Eyes: Conjunctivae, EOM and lids are normal. Pupils are equal, round, and reactive to light.   Fundoscopic exam:  The right eye shows no papilledema.   The left eye shows no papilledema.   Pupils 2 mm; Fundi normal   Neck: Trachea normal and normal range of motion. No thyroid mass present.   Cardiovascular: Regular rhythm and normal heart sounds.   No murmur heard.  Pulses:  Carotid pulses are 2+ on the right side, and 2+ on the left side.  Radial pulses are 2+ " on the right side, and 2+ on the left side.   Dorsalis pedis pulses are 2+ on the right side, and 2+ on the left side.   Pulmonary/Chest: Effort normal and breath sounds normal.   Abdomen is obese, non-tender and bowel sounds are present.  Musculoskeletal:   Lumbar back: The patient exhibits mild pain with movement. The patient exhibits normal range of motion, no deformity and no spasm.   Mild stiffness; SLR negative; Hip ROM normal        Neurologic Exam      Mental Status   Oriented to person, place, and time.   Attention: normal. Concentration: normal.   Speech: speech is normal   Level of consciousness: alert  Knowledge: good and consistent with education.   Normal comprehension.      Cranial Nerves   Cranial nerves II through XII intact.      Motor Exam   Muscle bulk: normal  Overall muscle tone: normal  No Pronator Drift    Strength   Strength 5/5 throughout.      Sensory Exam   Light touch normal.   Proprioception normal.      Gait, Coordination, and Reflexes      Gait: normal     Tremor   Resting tremor: absent  Intention tremor: absent  Action tremor: absent     Reflexes   Right biceps: 2+  Left biceps: 2+  Right triceps: 2+  Left triceps: 2+  Right patellar: 1+  Left patellar: 1+  Right achilles: 0+  Left achilles: 1+  No Babinski signs  Right Mcdonnell: absent  Left Mcdonnell: absent  Right ankle clonus: absent  Left ankle clonus: absent  KETURAH normal; R handed      Casandra was seen today for back pain.    Diagnoses and all orders for this visit:    Chronic bilateral low back pain without sciatica    Lumbosacral spondylosis without myelopathy    Acquired spondylolisthesis  Comments:  L3-L4 and L4-L5    Spinal stenosis, lumbar region, without neurogenic claudication    Obesity, Class III, BMI 40-49.9 (morbid obesity)      Plan:  I recommend having her knee surgery so she can begin exercising;  She must lose weight;  She may be a candidate for spine surgery in the future but she must get her BMI close to 30 in  order to consider elective surgery.  I have discussed this with the patient.  I will see her again in 3 months for re-evaluation.      I, Dr. Ady Funez, personally performed the services described in the documentation as scribed in my presence, and the documentation is both accurate and complete.    Ady Funez MD

## 2018-03-02 ENCOUNTER — TELEPHONE (OUTPATIENT)
Dept: FAMILY MEDICINE CLINIC | Facility: CLINIC | Age: 68
End: 2018-03-02

## 2018-03-02 NOTE — TELEPHONE ENCOUNTER
----- Message from Radha Mckeon sent at 3/1/2018  1:25 PM EST -----  Contact: patient  SHE WAS IN ON Monday AND [facility] WERE SENT [facility] FOR HER, [facility] SAID THAT THEY DID NOT GET THE TRAMADOL, SAID THERE WAS A SPECIAL FORM THAT IT NEEDED TO BE SENT IN ON. PLEASE CHECK ON IT:     traMADol (ULTRAM) 50 MG tablet 180 tablet 3 2/26/2018      Sig - Route: Take 1 tablet by mouth 2 (Two) Times a Day As Needed for Moderate Pain . - Oral      Associated Diagnoses       Chronic bilateral low back pain with bilateral sciatica  - Primary        Pharmacy     [facility] - [address] [phone] [facility] [phone] FX     THEY GAVE HER A FAX NUMBER OF: [phone]

## 2018-03-08 ENCOUNTER — TELEPHONE (OUTPATIENT)
Dept: FAMILY MEDICINE CLINIC | Facility: CLINIC | Age: 68
End: 2018-03-08

## 2018-03-08 NOTE — TELEPHONE ENCOUNTER
----- Message from Judi Valdez sent at 3/8/2018  1:18 PM EST -----  Contact: PT.  PT. CALLED [facility] RE. ?'S MEDICATION OF TRAMADOLE.  PT. STATED WERE FILLING ON: 03-; PT. STILL HAS NOT RECEIVED TO DATE.  [facility] PHONE #: [phone].

## 2018-03-08 NOTE — TELEPHONE ENCOUNTER
[facility].  Her script was shipped out on the 7th she should receive it by the 14th.  I called the patient and let her know about this.  I also called [facility] and told them to go ahead and refill her script early until she can get her script in from [facility] and she will pay out of pocket for this script.

## 2018-05-09 ENCOUNTER — TELEPHONE (OUTPATIENT)
Dept: FAMILY MEDICINE CLINIC | Facility: CLINIC | Age: 68
End: 2018-05-09

## 2018-05-09 RX ORDER — METHYLPREDNISOLONE 4 MG/1
TABLET ORAL
Qty: 21 TABLET | Refills: 0 | Status: SHIPPED | OUTPATIENT
Start: 2018-05-09 | End: 2019-03-04 | Stop reason: SDUPTHER

## 2018-05-09 NOTE — TELEPHONE ENCOUNTER
----- Message from Judi Valdez sent at 5/9/2018  9:43 AM EDT -----  Contact: PT.  Pt. IS NEEDING A REFILL OF MEDROL DOSE PACK.  Pt. GOING ON VACATION & NEEDING THIS MED.  RX=[facility], [place], [place].  Pt. CAN BE REACHED @ [facility] #; LEAVE MESSAGE, IF NEEDING.

## 2018-05-21 RX ORDER — MELOXICAM 15 MG/1
TABLET ORAL
Qty: 90 TABLET | Refills: 2 | Status: SHIPPED | OUTPATIENT
Start: 2018-05-21 | End: 2018-09-05 | Stop reason: SDUPTHER

## 2018-06-15 RX ORDER — TIZANIDINE HYDROCHLORIDE 2 MG/1
CAPSULE, GELATIN COATED ORAL
Qty: 270 CAPSULE | Refills: 0 | Status: SHIPPED | OUTPATIENT
Start: 2018-06-15 | End: 2018-09-05

## 2018-09-05 ENCOUNTER — OFFICE VISIT (OUTPATIENT)
Dept: FAMILY MEDICINE CLINIC | Facility: CLINIC | Age: 68
End: 2018-09-05

## 2018-09-05 VITALS
OXYGEN SATURATION: 98 % | DIASTOLIC BLOOD PRESSURE: 70 MMHG | BODY MASS INDEX: 40.97 KG/M2 | TEMPERATURE: 98 F | SYSTOLIC BLOOD PRESSURE: 118 MMHG | HEIGHT: 61 IN | WEIGHT: 217 LBS | HEART RATE: 76 BPM

## 2018-09-05 DIAGNOSIS — M54.41 CHRONIC BILATERAL LOW BACK PAIN WITH BILATERAL SCIATICA: ICD-10-CM

## 2018-09-05 DIAGNOSIS — M54.42 CHRONIC BILATERAL LOW BACK PAIN WITH BILATERAL SCIATICA: ICD-10-CM

## 2018-09-05 DIAGNOSIS — M51.36 DEGENERATIVE DISC DISEASE, LUMBAR: ICD-10-CM

## 2018-09-05 DIAGNOSIS — M48.062 SPINAL STENOSIS OF LUMBAR REGION WITH NEUROGENIC CLAUDICATION: Primary | ICD-10-CM

## 2018-09-05 DIAGNOSIS — Z96.653 STATUS POST TOTAL BILATERAL KNEE REPLACEMENT: ICD-10-CM

## 2018-09-05 DIAGNOSIS — R42 DIZZINESS: ICD-10-CM

## 2018-09-05 DIAGNOSIS — G89.29 CHRONIC BILATERAL LOW BACK PAIN WITH BILATERAL SCIATICA: ICD-10-CM

## 2018-09-05 DIAGNOSIS — L82.1 SEBORRHEIC KERATOSIS: ICD-10-CM

## 2018-09-05 DIAGNOSIS — K21.9 GASTROESOPHAGEAL REFLUX DISEASE, ESOPHAGITIS PRESENCE NOT SPECIFIED: ICD-10-CM

## 2018-09-05 PROCEDURE — 99214 OFFICE O/P EST MOD 30 MIN: CPT | Performed by: FAMILY MEDICINE

## 2018-09-05 RX ORDER — OMEPRAZOLE 20 MG/1
20 CAPSULE, DELAYED RELEASE ORAL DAILY
Qty: 90 CAPSULE | Refills: 3 | Status: SHIPPED | OUTPATIENT
Start: 2018-09-05 | End: 2019-03-04 | Stop reason: SDUPTHER

## 2018-09-05 RX ORDER — MECLIZINE HYDROCHLORIDE 25 MG/1
25 TABLET ORAL 3 TIMES DAILY PRN
Qty: 90 TABLET | Refills: 3 | Status: SHIPPED | OUTPATIENT
Start: 2018-09-05 | End: 2019-03-04 | Stop reason: SDUPTHER

## 2018-09-05 RX ORDER — TRAMADOL HYDROCHLORIDE 50 MG/1
50 TABLET ORAL 2 TIMES DAILY PRN
Qty: 180 TABLET | Refills: 1 | Status: SHIPPED | OUTPATIENT
Start: 2018-09-05 | End: 2019-03-04 | Stop reason: SDUPTHER

## 2018-09-05 RX ORDER — MELOXICAM 15 MG/1
15 TABLET ORAL DAILY
Qty: 90 TABLET | Refills: 3 | Status: SHIPPED | OUTPATIENT
Start: 2018-09-05 | End: 2019-03-04 | Stop reason: SDUPTHER

## 2018-09-05 NOTE — PROGRESS NOTES
Subjective   Casandra Trinidad is a 68 y.o. female    Chief Complaint  Chronic back pain  GERD  Dizziness    History of Present Illness  Patient presents today for education refills related to chronic back pain secondary to spinal stenosis, lumbar spondylosis and degenerative disc disease.  Also here for refills related to chronic dizziness, GERD and osteoarthritis.  She is now one month status post right total knee replacement surgery and doing quite well.  She has had previous left total knee replacement surgery.  She has a skin lesion behind her left knee she would like checked.    The following portions of the patient's history were reviewed and updated as appropriate: allergies, current medications, past social history and problem list    Review of Systems   Constitutional: Negative.    HENT: Negative for trouble swallowing and voice change.    Respiratory: Negative.    Cardiovascular: Negative for chest pain, palpitations and leg swelling.   Gastrointestinal: Negative.    Genitourinary: Negative.    Musculoskeletal: Positive for arthralgias, back pain and gait problem. Negative for myalgias.   Skin: Positive for color change.   Neurological: Negative for dizziness, tremors, weakness and numbness.   Psychiatric/Behavioral: Negative for behavioral problems and dysphoric mood. The patient is not nervous/anxious.        Objective     Vitals:    09/05/18 1318   BP: 118/70   Pulse: 76   Temp: 98 °F (36.7 °C)   SpO2: 98%       Physical Exam   Constitutional: She is oriented to person, place, and time. She appears well-developed and well-nourished.   obese   HENT:   Head: Normocephalic and atraumatic.   Eyes: Pupils are equal, round, and reactive to light. Conjunctivae are normal.   Cardiovascular: Normal rate and regular rhythm.    Pulmonary/Chest: Effort normal and breath sounds normal.   Abdominal: Soft. Bowel sounds are normal.   Musculoskeletal:        Lumbar back: She exhibits decreased range of motion,  tenderness, bony tenderness and pain. She exhibits no swelling, no deformity and no spasm.   Neurological: She is alert and oriented to person, place, and time. She has normal reflexes.   Skin: Skin is warm. No rash noted. No erythema.   1 cm tan colored seborrheic keratosis left popliteal region   Psychiatric: She has a normal mood and affect. Her behavior is normal.   Nursing note and vitals reviewed.      Assessment/Plan   Problem List Items Addressed This Visit        Digestive    Esophageal reflux    Relevant Medications    omeprazole (priLOSEC) 20 MG capsule      Other Visit Diagnoses     Spinal stenosis of lumbar region with neurogenic claudication    -  Primary    Chronic bilateral low back pain with bilateral sciatica        Relevant Medications    traMADol (ULTRAM) 50 MG tablet    Dizziness        Relevant Medications    meclizine (ANTIVERT) 25 MG tablet    Degenerative disc disease, lumbar        Seborrheic keratosis        Status post total bilateral knee replacement

## 2018-10-11 ENCOUNTER — TELEPHONE (OUTPATIENT)
Dept: FAMILY MEDICINE CLINIC | Facility: CLINIC | Age: 68
End: 2018-10-11

## 2018-10-11 RX ORDER — TIZANIDINE 2 MG/1
2 TABLET ORAL 3 TIMES DAILY
Qty: 90 TABLET | Refills: 1 | Status: SHIPPED | OUTPATIENT
Start: 2018-10-11 | End: 2018-10-11 | Stop reason: SDUPTHER

## 2018-10-11 RX ORDER — TIZANIDINE 2 MG/1
2 TABLET ORAL 3 TIMES DAILY
Qty: 270 TABLET | Refills: 1 | Status: SHIPPED | OUTPATIENT
Start: 2018-10-11 | End: 2019-03-04 | Stop reason: SDUPTHER

## 2018-10-11 NOTE — TELEPHONE ENCOUNTER
----- Message from Adamaris Trevino sent at 10/10/2018  4:31 PM EDT -----  Contact: PT  MED REQUEST    tizanadine 2mg 3x/day (90 supply)    [facility]

## 2019-02-05 ENCOUNTER — TELEPHONE (OUTPATIENT)
Dept: FAMILY MEDICINE CLINIC | Facility: CLINIC | Age: 69
End: 2019-02-05

## 2019-02-07 ENCOUNTER — HOSPITAL ENCOUNTER (EMERGENCY)
Facility: HOSPITAL | Age: 69
Discharge: HOME | End: 2019-02-07
Attending: EMERGENCY MEDICINE | Admitting: EMERGENCY MEDICINE

## 2019-02-07 ENCOUNTER — APPOINTMENT (OUTPATIENT)
Dept: GENERAL RADIOLOGY | Facility: HOSPITAL | Age: 69
End: 2019-02-07

## 2019-02-07 VITALS
TEMPERATURE: 97.9 F | OXYGEN SATURATION: 95 % | DIASTOLIC BLOOD PRESSURE: 69 MMHG | SYSTOLIC BLOOD PRESSURE: 108 MMHG | RESPIRATION RATE: 20 BRPM | WEIGHT: 230 LBS | HEART RATE: 83 BPM | BODY MASS INDEX: 43.43 KG/M2 | HEIGHT: 61 IN

## 2019-02-07 DIAGNOSIS — R03.0 ELEVATED BLOOD-PRESSURE READING WITHOUT DIAGNOSIS OF HYPERTENSION: ICD-10-CM

## 2019-02-07 DIAGNOSIS — H65.03 BILATERAL ACUTE SEROUS OTITIS MEDIA, RECURRENCE NOT SPECIFIED: ICD-10-CM

## 2019-02-07 DIAGNOSIS — R42 ACUTE ONSET OF SEVERE VERTIGO: Primary | ICD-10-CM

## 2019-02-07 LAB
ALBUMIN SERPL-MCNC: 4.18 G/DL (ref 3.2–4.8)
ALBUMIN/GLOB SERPL: 1.3 G/DL (ref 1.5–2.5)
ALP SERPL-CCNC: 102 U/L (ref 25–100)
ALT SERPL W P-5'-P-CCNC: 25 U/L (ref 7–40)
ANION GAP SERPL CALCULATED.3IONS-SCNC: 6 MMOL/L (ref 3–11)
AST SERPL-CCNC: 29 U/L (ref 0–33)
BACTERIA UR QL AUTO: NORMAL /HPF
BASOPHILS # BLD AUTO: 0.02 10*3/MM3 (ref 0–0.2)
BASOPHILS NFR BLD AUTO: 0.2 % (ref 0–1)
BILIRUB SERPL-MCNC: 0.5 MG/DL (ref 0.3–1.2)
BILIRUB UR QL STRIP: NEGATIVE
BUN BLD-MCNC: 12 MG/DL (ref 9–23)
BUN/CREAT SERPL: 17.4 (ref 7–25)
CALCIUM SPEC-SCNC: 9.6 MG/DL (ref 8.7–10.4)
CHLORIDE SERPL-SCNC: 102 MMOL/L (ref 99–109)
CLARITY UR: CLEAR
CO2 SERPL-SCNC: 32 MMOL/L (ref 20–31)
COLOR UR: YELLOW
CREAT BLD-MCNC: 0.69 MG/DL (ref 0.6–1.3)
DEPRECATED RDW RBC AUTO: 43.6 FL (ref 37–54)
EOSINOPHIL # BLD AUTO: 0.06 10*3/MM3 (ref 0–0.3)
EOSINOPHIL NFR BLD AUTO: 0.6 % (ref 0–3)
ERYTHROCYTE [DISTWIDTH] IN BLOOD BY AUTOMATED COUNT: 13.4 % (ref 11.3–14.5)
GFR SERPL CREATININE-BSD FRML MDRD: 85 ML/MIN/1.73
GLOBULIN UR ELPH-MCNC: 3.1 GM/DL
GLUCOSE BLD-MCNC: 114 MG/DL (ref 70–100)
GLUCOSE UR STRIP-MCNC: NEGATIVE MG/DL
HCT VFR BLD AUTO: 40.4 % (ref 34.5–44)
HGB BLD-MCNC: 13.1 G/DL (ref 11.5–15.5)
HGB UR QL STRIP.AUTO: NEGATIVE
HOLD SPECIMEN: NORMAL
HOLD SPECIMEN: NORMAL
HYALINE CASTS UR QL AUTO: NORMAL /LPF
IMM GRANULOCYTES # BLD AUTO: 0.03 10*3/MM3 (ref 0–0.03)
IMM GRANULOCYTES NFR BLD AUTO: 0.3 % (ref 0–0.6)
KETONES UR QL STRIP: NEGATIVE
LEUKOCYTE ESTERASE UR QL STRIP.AUTO: ABNORMAL
LYMPHOCYTES # BLD AUTO: 1.7 10*3/MM3 (ref 0.6–4.8)
LYMPHOCYTES NFR BLD AUTO: 17.5 % (ref 24–44)
MAGNESIUM SERPL-MCNC: 1.9 MG/DL (ref 1.3–2.7)
MCH RBC QN AUTO: 29.1 PG (ref 27–31)
MCHC RBC AUTO-ENTMCNC: 32.4 G/DL (ref 32–36)
MCV RBC AUTO: 89.8 FL (ref 80–99)
MONOCYTES # BLD AUTO: 0.61 10*3/MM3 (ref 0–1)
MONOCYTES NFR BLD AUTO: 6.3 % (ref 0–12)
NEUTROPHILS # BLD AUTO: 7.3 10*3/MM3 (ref 1.5–8.3)
NEUTROPHILS NFR BLD AUTO: 75.4 % (ref 41–71)
NITRITE UR QL STRIP: NEGATIVE
PH UR STRIP.AUTO: >=9 [PH] (ref 5–8)
PLATELET # BLD AUTO: 281 10*3/MM3 (ref 150–450)
PMV BLD AUTO: 10.7 FL (ref 6–12)
POTASSIUM BLD-SCNC: 4.4 MMOL/L (ref 3.5–5.5)
PROT SERPL-MCNC: 7.3 G/DL (ref 5.7–8.2)
PROT UR QL STRIP: NEGATIVE
RBC # BLD AUTO: 4.5 10*6/MM3 (ref 3.89–5.14)
RBC # UR: NORMAL /HPF
REF LAB TEST METHOD: NORMAL
SODIUM BLD-SCNC: 140 MMOL/L (ref 132–146)
SP GR UR STRIP: 1.01 (ref 1–1.03)
SQUAMOUS #/AREA URNS HPF: NORMAL /HPF
TROPONIN I SERPL-MCNC: <0.006 NG/ML
UROBILINOGEN UR QL STRIP: ABNORMAL
WBC NRBC COR # BLD: 9.69 10*3/MM3 (ref 3.5–10.8)
WBC UR QL AUTO: NORMAL /HPF
WHOLE BLOOD HOLD SPECIMEN: NORMAL
WHOLE BLOOD HOLD SPECIMEN: NORMAL

## 2019-02-07 PROCEDURE — 83735 ASSAY OF MAGNESIUM: CPT | Performed by: EMERGENCY MEDICINE

## 2019-02-07 PROCEDURE — 80053 COMPREHEN METABOLIC PANEL: CPT | Performed by: EMERGENCY MEDICINE

## 2019-02-07 PROCEDURE — 96374 THER/PROPH/DIAG INJ IV PUSH: CPT

## 2019-02-07 PROCEDURE — 99284 EMERGENCY DEPT VISIT MOD MDM: CPT

## 2019-02-07 PROCEDURE — 93005 ELECTROCARDIOGRAM TRACING: CPT | Performed by: EMERGENCY MEDICINE

## 2019-02-07 PROCEDURE — 93005 ELECTROCARDIOGRAM TRACING: CPT

## 2019-02-07 PROCEDURE — 25010000002 LORAZEPAM PER 2 MG: Performed by: EMERGENCY MEDICINE

## 2019-02-07 PROCEDURE — 71045 X-RAY EXAM CHEST 1 VIEW: CPT

## 2019-02-07 PROCEDURE — 25010000002 ONDANSETRON PER 1 MG: Performed by: EMERGENCY MEDICINE

## 2019-02-07 PROCEDURE — 96375 TX/PRO/DX INJ NEW DRUG ADDON: CPT

## 2019-02-07 PROCEDURE — 85025 COMPLETE CBC W/AUTO DIFF WBC: CPT | Performed by: EMERGENCY MEDICINE

## 2019-02-07 PROCEDURE — 84484 ASSAY OF TROPONIN QUANT: CPT | Performed by: EMERGENCY MEDICINE

## 2019-02-07 PROCEDURE — 25010000002 DIMENHYDRINATE 50 MG/ML SOLUTION: Performed by: EMERGENCY MEDICINE

## 2019-02-07 PROCEDURE — 81001 URINALYSIS AUTO W/SCOPE: CPT | Performed by: EMERGENCY MEDICINE

## 2019-02-07 PROCEDURE — 99285 EMERGENCY DEPT VISIT HI MDM: CPT

## 2019-02-07 RX ORDER — DIMENHYDRINATE 50 MG/ML
50 INJECTION, SOLUTION INTRAMUSCULAR; INTRAVENOUS ONCE
Status: COMPLETED | OUTPATIENT
Start: 2019-02-07 | End: 2019-02-07

## 2019-02-07 RX ORDER — SODIUM CHLORIDE 0.9 % (FLUSH) 0.9 %
10 SYRINGE (ML) INJECTION AS NEEDED
Status: DISCONTINUED | OUTPATIENT
Start: 2019-02-07 | End: 2019-02-07 | Stop reason: HOSPADM

## 2019-02-07 RX ORDER — ONDANSETRON 2 MG/ML
4 INJECTION INTRAMUSCULAR; INTRAVENOUS ONCE
Status: COMPLETED | OUTPATIENT
Start: 2019-02-07 | End: 2019-02-07

## 2019-02-07 RX ORDER — LORAZEPAM 1 MG/1
1 TABLET ORAL EVERY 8 HOURS PRN
Qty: 8 TABLET | Refills: 0 | Status: SHIPPED | OUTPATIENT
Start: 2019-02-07 | End: 2019-04-18

## 2019-02-07 RX ORDER — ONDANSETRON 4 MG/1
4 TABLET, ORALLY DISINTEGRATING ORAL EVERY 8 HOURS PRN
Qty: 6 TABLET | Refills: 0 | Status: SHIPPED | OUTPATIENT
Start: 2019-02-07 | End: 2019-04-18

## 2019-02-07 RX ORDER — LORAZEPAM 2 MG/ML
1 INJECTION INTRAMUSCULAR ONCE
Status: COMPLETED | OUTPATIENT
Start: 2019-02-07 | End: 2019-02-07

## 2019-02-07 RX ADMIN — DIMENHYDRINATE 50 MG: 50 INJECTION, SOLUTION INTRAMUSCULAR; INTRAVENOUS at 12:22

## 2019-02-07 RX ADMIN — LORAZEPAM 1 MG: 2 INJECTION INTRAMUSCULAR; INTRAVENOUS at 12:23

## 2019-02-07 RX ADMIN — ONDANSETRON 4 MG: 2 INJECTION INTRAMUSCULAR; INTRAVENOUS at 12:22

## 2019-02-07 NOTE — ED PROVIDER NOTES
Subjective   Casandra Trinidad is a 68 y.o.female who presents to the ED with her family with c/o dizziness with onset last night that worsened this morning. She reports a h/o vertigo buts states she has only had 2 episodes in the past 10 years. She developed vertigo last night so she took an extra Meclizine then went to bed. She woke up this morning to worsening vertigo. Her vertigo worsens with head movement and improves with keeping still. She experiences nausea but denies any vomiting. She denies any headache, numbness, tingling or weakness. She is able to move all of her extremities normally. She is recovering from a recent cold but denies any current fevers, cough or ear pain. She reports h/o chronic back pain.         History provided by:  Patient  Dizziness   Quality:  Vertigo  Severity:  Moderate  Onset quality:  Gradual  Duration:  12 hours  Timing:  Constant  Progression:  Worsening  Chronicity:  Recurrent  Context: head movement    Relieved by:  Being still and closing eyes  Worsened by:  Movement and turning head  Ineffective treatments:  None tried  Associated symptoms: nausea    Associated symptoms: no chest pain, no headaches, no hearing loss, no shortness of breath, no vision changes, no vomiting and no weakness    Risk factors: hx of vertigo        Review of Systems   Constitutional: Negative for fever.   HENT: Negative for ear pain, hearing loss and sore throat.    Respiratory: Negative for shortness of breath.    Cardiovascular: Negative for chest pain.   Gastrointestinal: Positive for nausea. Negative for vomiting.   Neurological: Positive for dizziness. Negative for facial asymmetry, speech difficulty, weakness, numbness and headaches.   All other systems reviewed and are negative.      Past Medical History:   Diagnosis Date   • Age related cataract    • Arthropathy, lower leg    • Dehiscence of incision    • Ear itch    • Myalgia and myositis    • Pain in joint, ankle and foot    • Pain in  joint, hand    • Pain in joint, lower leg    • Tenosynovitis of finger    • Vitamin D deficiency        Allergies   Allergen Reactions   • Codeine GI Intolerance   • Diclofenac Nausea And Vomiting   • Lortab [Hydrocodone-Acetaminophen] Nausea And Vomiting   • Tyloxapol Nausea And Vomiting       Past Surgical History:   Procedure Laterality Date   • BREAST LUMPECTOMY Right 01/2014       Family History   Problem Relation Age of Onset   • COPD Mother    • COPD Father    • COPD Sister        Social History     Socioeconomic History   • Marital status:      Spouse name: Not on file   • Number of children: Not on file   • Years of education: Not on file   • Highest education level: Not on file   Tobacco Use   • Smoking status: Never Smoker   • Smokeless tobacco: Never Used   Substance and Sexual Activity   • Alcohol use: No   • Drug use: No         Objective   Physical Exam   Constitutional: She is oriented to person, place, and time. She appears well-developed and well-nourished. No distress.   HENT:   Head: Normocephalic and atraumatic.   Right Ear: External ear normal.   Left Ear: External ear normal.   Nose: Nose normal.   Clear fluids behind both ear drums   Eyes: Conjunctivae are normal. No scleral icterus.   Nystagmus leftward gaze   Neck: Normal range of motion. Neck supple.   Cardiovascular: Normal rate, regular rhythm and normal heart sounds.   No murmur heard.  Pulmonary/Chest: Effort normal and breath sounds normal. No respiratory distress. She has no wheezes. She has no rales.   Abdominal: Soft. Bowel sounds are normal. She exhibits no distension. There is no tenderness.   Musculoskeletal: Normal range of motion. She exhibits no edema or tenderness.   Neurological: She is alert and oriented to person, place, and time. No cranial nerve deficit or sensory deficit. Coordination normal.   Skin: Skin is warm and dry. She is not diaphoretic.   Psychiatric: She has a normal mood and affect. Her behavior is  normal.   Nursing note and vitals reviewed.      Procedures         ED Course  ED Course as of Feb 07 2219   Thu Feb 07, 2019   1334 Mrs. Trinidad has received medications and is sleeping.  She has had no further vomiting.  [DT]   1412 Mrs. Trinidad tells me she feels better.  I spoke with her about discharge planning and will discharge her with her daughter  [DT]      ED Course User Index  [DT] Mickey Monsivais MD     Recent Results (from the past 24 hour(s))   Comprehensive Metabolic Panel    Collection Time: 02/07/19 11:13 AM   Result Value Ref Range    Glucose 114 (H) 70 - 100 mg/dL    BUN 12 9 - 23 mg/dL    Creatinine 0.69 0.60 - 1.30 mg/dL    Sodium 140 132 - 146 mmol/L    Potassium 4.4 3.5 - 5.5 mmol/L    Chloride 102 99 - 109 mmol/L    CO2 32.0 (H) 20.0 - 31.0 mmol/L    Calcium 9.6 8.7 - 10.4 mg/dL    Total Protein 7.3 5.7 - 8.2 g/dL    Albumin 4.18 3.20 - 4.80 g/dL    ALT (SGPT) 25 7 - 40 U/L    AST (SGOT) 29 0 - 33 U/L    Alkaline Phosphatase 102 (H) 25 - 100 U/L    Total Bilirubin 0.5 0.3 - 1.2 mg/dL    eGFR Non African Amer 85 >60 mL/min/1.73    Globulin 3.1 gm/dL    A/G Ratio 1.3 (L) 1.5 - 2.5 g/dL    BUN/Creatinine Ratio 17.4 7.0 - 25.0    Anion Gap 6.0 3.0 - 11.0 mmol/L   Magnesium    Collection Time: 02/07/19 11:13 AM   Result Value Ref Range    Magnesium 1.9 1.3 - 2.7 mg/dL   Light Blue Top    Collection Time: 02/07/19 11:13 AM   Result Value Ref Range    Extra Tube hold for add-on    Green Top (Gel)    Collection Time: 02/07/19 11:13 AM   Result Value Ref Range    Extra Tube Hold for add-ons.    Lavender Top    Collection Time: 02/07/19 11:13 AM   Result Value Ref Range    Extra Tube hold for add-on    Gold Top - SST    Collection Time: 02/07/19 11:13 AM   Result Value Ref Range    Extra Tube Hold for add-ons.    CBC Auto Differential    Collection Time: 02/07/19 11:13 AM   Result Value Ref Range    WBC 9.69 3.50 - 10.80 10*3/mm3    RBC 4.50 3.89 - 5.14 10*6/mm3    Hemoglobin 13.1 11.5 - 15.5 g/dL     Hematocrit 40.4 34.5 - 44.0 %    MCV 89.8 80.0 - 99.0 fL    MCH 29.1 27.0 - 31.0 pg    MCHC 32.4 32.0 - 36.0 g/dL    RDW 13.4 11.3 - 14.5 %    RDW-SD 43.6 37.0 - 54.0 fl    MPV 10.7 6.0 - 12.0 fL    Platelets 281 150 - 450 10*3/mm3    Neutrophil % 75.4 (H) 41.0 - 71.0 %    Lymphocyte % 17.5 (L) 24.0 - 44.0 %    Monocyte % 6.3 0.0 - 12.0 %    Eosinophil % 0.6 0.0 - 3.0 %    Basophil % 0.2 0.0 - 1.0 %    Immature Grans % 0.3 0.0 - 0.6 %    Neutrophils, Absolute 7.30 1.50 - 8.30 10*3/mm3    Lymphocytes, Absolute 1.70 0.60 - 4.80 10*3/mm3    Monocytes, Absolute 0.61 0.00 - 1.00 10*3/mm3    Eosinophils, Absolute 0.06 0.00 - 0.30 10*3/mm3    Basophils, Absolute 0.02 0.00 - 0.20 10*3/mm3    Immature Grans, Absolute 0.03 0.00 - 0.03 10*3/mm3   Troponin    Collection Time: 02/07/19 11:13 AM   Result Value Ref Range    Troponin I <0.006 <=0.039 ng/mL   Urinalysis With Microscopic If Indicated (No Culture) - Urine, Clean Catch    Collection Time: 02/07/19 11:34 AM   Result Value Ref Range    Color, UA Yellow Yellow, Straw    Appearance, UA Clear Clear    pH, UA >=9.0 (H) 5.0 - 8.0    Specific Gravity, UA 1.008 1.001 - 1.030    Glucose, UA Negative Negative    Ketones, UA Negative Negative    Bilirubin, UA Negative Negative    Blood, UA Negative Negative    Protein, UA Negative Negative    Leuk Esterase, UA Trace (A) Negative    Nitrite, UA Negative Negative    Urobilinogen, UA 0.2 E.U./dL 0.2 - 1.0 E.U./dL   Urinalysis, Microscopic Only - Urine, Clean Catch    Collection Time: 02/07/19 11:34 AM   Result Value Ref Range    RBC, UA 0-2 None Seen, 0-2 /HPF    WBC, UA 0-2 None Seen, 0-2 /HPF    Bacteria, UA None Seen None Seen, Trace /HPF    Squamous Epithelial Cells, UA None Seen None Seen, 0-2 /HPF    Hyaline Casts, UA None Seen 0 - 6 /LPF    Methodology Automated Microscopy      Note: In addition to lab results from this visit, the labs listed above may include labs taken at another facility or during a different encounter  within the last 24 hours. Please correlate lab times with ED admission and discharge times for further clarification of the services performed during this visit.    XR Chest 1 View   Final Result   Mild pulmonary venous hypertension. No acute chest disease   is seen.       D:  02/07/2019   E:  02/07/2019       This report was finalized on 2/7/2019 9:49 PM by DR. Mac Peterson MD.            Vitals:    02/07/19 1223 02/07/19 1230 02/07/19 1300 02/07/19 1430   BP: 165/85 170/84 134/69 108/69   BP Location:       Patient Position:       Pulse: 77 72 75 83   Resp:    20   Temp:       TempSrc:       SpO2: 95% 94% 93% 95%   Weight:       Height:         Medications   LORazepam (ATIVAN) injection 1 mg (1 mg Intravenous Given 2/7/19 1223)   ondansetron (ZOFRAN) injection 4 mg (4 mg Intravenous Given 2/7/19 1222)   dimenhydrinate injection 50 mg (50 mg Intravenous Given 2/7/19 1222)     ECG/EMG Results (last 24 hours)     Procedure Component Value Units Date/Time    ECG 12 Lead [[id number]] Collected:  02/07/19 1115     Updated:  02/07/19 1137        ECG 12 Lead                             MDM  Number of Diagnoses or Management Options  Acute onset of severe vertigo: new and requires workup  Bilateral acute serous otitis media, recurrence not specified:   Elevated blood-pressure reading without diagnosis of hypertension:      Amount and/or Complexity of Data Reviewed  Clinical lab tests: reviewed and ordered  Independent visualization of images, tracings, or specimens: yes    Patient Progress  Patient progress: improved      Final diagnoses:   Acute onset of severe vertigo   Bilateral acute serous otitis media, recurrence not specified   Elevated blood-pressure reading without diagnosis of hypertension       Documentation assistance provided by tino Camejo.  Information recorded by the scribe was done at my direction and has been verified and validated by me.     Osvaldo Camejo  02/07/19 1143       Mickey Monsivais,  MD  02/07/19 3639

## 2019-02-07 NOTE — DISCHARGE INSTRUCTIONS
No driving on the medicines that I have prescribed as they will make you sleepy.  Don't drive until her dizziness is completely gone.  Return if you're unable to hold down medications, have difficulty controlling her arms or legs, or if you have any other concerns.  Monitor your blood pressure at home and report it to your primary care provider upon follow-up.  Begin taking an antihistamine daily such as Claritin or Zyrtec.

## 2019-02-11 ENCOUNTER — TELEPHONE (OUTPATIENT)
Dept: FAMILY MEDICINE CLINIC | Facility: CLINIC | Age: 69
End: 2019-02-11

## 2019-02-11 NOTE — TELEPHONE ENCOUNTER
----- Message from Judi Valdez sent at 2/11/2019  3:43 PM EST -----  Contact: PT.  PT. SEE'S DR. CONTRERAS  PHYSICAL THERAPY STATED THERE IS A TREATMENT FOR VERTIGO.  PT. WAS SEEN IN  ER THIS PAST Thursday.  PT. IS WANTING AN ORDER FOR PT., PT. WOULD LIKE A SCRIPT TO TAKE WITH HER ON THIS Thursday FOR PHYSICAL THERAPY APPT. STATING FOR VERTIGO ISSUES.    PT. IS WANTING MAILED TO HER.    PT. CAN BE REACHED @ [facility] #.

## 2019-02-12 NOTE — TELEPHONE ENCOUNTER
I informed patient that she likely won't receive order by Thursday and she is going to call back with the fax number to PT office.

## 2019-02-25 DIAGNOSIS — M54.42 CHRONIC BILATERAL LOW BACK PAIN WITH BILATERAL SCIATICA: ICD-10-CM

## 2019-02-25 DIAGNOSIS — G89.29 CHRONIC BILATERAL LOW BACK PAIN WITH BILATERAL SCIATICA: ICD-10-CM

## 2019-02-25 DIAGNOSIS — M54.41 CHRONIC BILATERAL LOW BACK PAIN WITH BILATERAL SCIATICA: ICD-10-CM

## 2019-02-28 RX ORDER — LIDOCAINE 50 MG/G
PATCH TOPICAL
Qty: 60 PATCH | Refills: 3 | Status: SHIPPED | OUTPATIENT
Start: 2019-02-28 | End: 2019-03-04 | Stop reason: SDUPTHER

## 2019-03-04 ENCOUNTER — OFFICE VISIT (OUTPATIENT)
Dept: FAMILY MEDICINE CLINIC | Facility: CLINIC | Age: 69
End: 2019-03-04

## 2019-03-04 VITALS
RESPIRATION RATE: 18 BRPM | HEART RATE: 64 BPM | WEIGHT: 237.6 LBS | DIASTOLIC BLOOD PRESSURE: 68 MMHG | SYSTOLIC BLOOD PRESSURE: 120 MMHG | OXYGEN SATURATION: 98 % | HEIGHT: 61 IN | BODY MASS INDEX: 44.86 KG/M2

## 2019-03-04 DIAGNOSIS — M48.062 SPINAL STENOSIS OF LUMBAR REGION WITH NEUROGENIC CLAUDICATION: ICD-10-CM

## 2019-03-04 DIAGNOSIS — G89.29 CHRONIC BILATERAL LOW BACK PAIN WITH BILATERAL SCIATICA: Primary | ICD-10-CM

## 2019-03-04 DIAGNOSIS — E66.01 CLASS 3 SEVERE OBESITY DUE TO EXCESS CALORIES WITH SERIOUS COMORBIDITY AND BODY MASS INDEX (BMI) OF 40.0 TO 44.9 IN ADULT (HCC): ICD-10-CM

## 2019-03-04 DIAGNOSIS — M51.36 DEGENERATIVE DISC DISEASE, LUMBAR: ICD-10-CM

## 2019-03-04 DIAGNOSIS — M54.42 CHRONIC BILATERAL LOW BACK PAIN WITH BILATERAL SCIATICA: Primary | ICD-10-CM

## 2019-03-04 DIAGNOSIS — M54.41 CHRONIC BILATERAL LOW BACK PAIN WITH BILATERAL SCIATICA: Primary | ICD-10-CM

## 2019-03-04 DIAGNOSIS — R42 DIZZINESS: ICD-10-CM

## 2019-03-04 PROCEDURE — 99214 OFFICE O/P EST MOD 30 MIN: CPT | Performed by: FAMILY MEDICINE

## 2019-03-04 RX ORDER — METHYLPREDNISOLONE 4 MG/1
TABLET ORAL
Qty: 21 TABLET | Refills: 0 | Status: SHIPPED | OUTPATIENT
Start: 2019-03-04 | End: 2019-04-18

## 2019-03-04 RX ORDER — TIZANIDINE 2 MG/1
2 TABLET ORAL 3 TIMES DAILY
Qty: 270 TABLET | Refills: 1 | Status: SHIPPED | OUTPATIENT
Start: 2019-03-04 | End: 2020-06-01 | Stop reason: SDUPTHER

## 2019-03-04 RX ORDER — LIDOCAINE 50 MG/G
3 PATCH TOPICAL EVERY 24 HOURS
Qty: 270 PATCH | Refills: 3 | Status: SHIPPED | OUTPATIENT
Start: 2019-03-04 | End: 2020-03-25

## 2019-03-04 RX ORDER — SERTRALINE HYDROCHLORIDE 25 MG/1
25 TABLET, FILM COATED ORAL DAILY
Qty: 90 TABLET | Refills: 3 | Status: SHIPPED | OUTPATIENT
Start: 2019-03-04 | End: 2019-11-11 | Stop reason: SDUPTHER

## 2019-03-04 RX ORDER — TRAMADOL HYDROCHLORIDE 50 MG/1
50 TABLET ORAL 2 TIMES DAILY PRN
Qty: 180 TABLET | Refills: 1 | Status: SHIPPED | OUTPATIENT
Start: 2019-03-04 | End: 2019-03-04 | Stop reason: SDUPTHER

## 2019-03-04 RX ORDER — MELOXICAM 15 MG/1
15 TABLET ORAL DAILY
Qty: 90 TABLET | Refills: 3 | Status: ON HOLD | OUTPATIENT
Start: 2019-03-04 | End: 2020-01-20

## 2019-03-04 RX ORDER — OMEPRAZOLE 20 MG/1
20 CAPSULE, DELAYED RELEASE ORAL DAILY
Qty: 90 CAPSULE | Refills: 3 | Status: SHIPPED | OUTPATIENT
Start: 2019-03-04 | End: 2020-03-23

## 2019-03-04 RX ORDER — TRAMADOL HYDROCHLORIDE 50 MG/1
50 TABLET ORAL 2 TIMES DAILY PRN
Qty: 180 TABLET | Refills: 1 | Status: SHIPPED | OUTPATIENT
Start: 2019-03-04 | End: 2019-08-30 | Stop reason: SDUPTHER

## 2019-03-04 RX ORDER — MECLIZINE HYDROCHLORIDE 25 MG/1
25 TABLET ORAL 3 TIMES DAILY PRN
Qty: 90 TABLET | Refills: 3 | Status: SHIPPED | OUTPATIENT
Start: 2019-03-04 | End: 2019-06-11 | Stop reason: SDUPTHER

## 2019-03-04 NOTE — PROGRESS NOTES
Subjective   Casandra Trinidad is a 68 y.o. female    Chief Complaint    Chronic back pain  Obesity  Osteoarthritis  Dizziness  Anxiety    History of Present Illness  Patient presents today for six-month recheck and prescription renewals.  She continues to suffer with chronic back pain secondary to degenerative disc disease and lumbar spondylosis.  She also has spinal stenosis.  She has been evaluated by neurosurgery and was told that she might benefit from surgery but she would need to lose 70 pounds before they would do the surgery.  She does not feel there is any way she could lose 70 pounds.  She reports that her mobility continues to decrease overall.  She is no longer taking Evista for menopause.  She is due for refills on her medications today.  All of these go to mail order.  She is using 3 Lidoderm patches a day.    The following portions of the patient's history were reviewed and updated as appropriate: allergies, current medications, past social history and problem list    Review of Systems   Constitutional: Negative.  Negative for appetite change and fatigue.   HENT: Negative.    Eyes: Negative.    Respiratory: Negative.  Negative for chest tightness and shortness of breath.    Gastrointestinal: Negative.  Negative for abdominal pain, diarrhea and nausea.   Genitourinary: Negative.    Musculoskeletal: Positive for back pain. Negative for arthralgias, gait problem and myalgias.   Skin: Negative.    Neurological: Negative for dizziness, tremors, weakness, light-headedness, numbness and headaches.   Hematological: Negative for adenopathy. Does not bruise/bleed easily.   Psychiatric/Behavioral: Positive for dysphoric mood and sleep disturbance. Negative for agitation, behavioral problems, confusion, decreased concentration and suicidal ideas. The patient is nervous/anxious.        Objective     Vitals:    03/04/19 1439   BP: 120/68   Pulse: 64   Resp: 18   SpO2: 98%       Physical Exam   Constitutional: She  is oriented to person, place, and time. She appears well-developed.   obese   HENT:   Head: Normocephalic and atraumatic.   Eyes: Conjunctivae are normal. Pupils are equal, round, and reactive to light.   Neck: Normal range of motion. Neck supple. No thyromegaly present.   Cardiovascular: Normal rate and regular rhythm.   Pulmonary/Chest: Effort normal and breath sounds normal.   Abdominal: Soft. Bowel sounds are normal.   Musculoskeletal:        Lumbar back: She exhibits decreased range of motion, tenderness, bony tenderness and pain. She exhibits no swelling, no deformity and no spasm.   Neurological: She is alert and oriented to person, place, and time. She has normal reflexes.   Skin: Skin is warm and dry.   Psychiatric: She has a normal mood and affect. Her behavior is normal.   Nursing note and vitals reviewed.      Assessment/Plan   Problem List Items Addressed This Visit     None      Visit Diagnoses     Chronic bilateral low back pain with bilateral sciatica    -  Primary    Relevant Medications    lidocaine (LIDODERM) 5 %    traMADol (ULTRAM) 50 MG tablet    Dizziness        Relevant Medications    meclizine (ANTIVERT) 25 MG tablet    Spinal stenosis of lumbar region with neurogenic claudication        Degenerative disc disease, lumbar        Class 3 severe obesity due to excess calories with serious comorbidity and body mass index (BMI) of 40.0 to 44.9 in adult (CMS/[facility])            Encourage patient to try to lose weight

## 2019-03-29 ENCOUNTER — TRANSCRIBE ORDERS (OUTPATIENT)
Dept: FAMILY MEDICINE CLINIC | Facility: CLINIC | Age: 69
End: 2019-03-29

## 2019-03-29 ENCOUNTER — TELEPHONE (OUTPATIENT)
Dept: FAMILY MEDICINE CLINIC | Facility: CLINIC | Age: 69
End: 2019-03-29

## 2019-03-29 DIAGNOSIS — M54.41 CHRONIC BILATERAL LOW BACK PAIN WITH BILATERAL SCIATICA: Primary | ICD-10-CM

## 2019-03-29 DIAGNOSIS — M54.42 CHRONIC BILATERAL LOW BACK PAIN WITH BILATERAL SCIATICA: Primary | ICD-10-CM

## 2019-03-29 DIAGNOSIS — G89.29 CHRONIC BILATERAL LOW BACK PAIN WITH BILATERAL SCIATICA: Primary | ICD-10-CM

## 2019-04-16 ENCOUNTER — TELEPHONE (OUTPATIENT)
Dept: PAIN MEDICINE | Facility: CLINIC | Age: 69
End: 2019-04-16

## 2019-04-16 PROBLEM — M43.16 SPONDYLOLISTHESIS OF LUMBAR REGION: Status: ACTIVE | Noted: 2019-04-16

## 2019-04-16 PROBLEM — M48.062 LUMBAR STENOSIS WITH NEUROGENIC CLAUDICATION: Status: ACTIVE | Noted: 2019-04-16

## 2019-04-16 PROBLEM — M51.36 DDD (DEGENERATIVE DISC DISEASE), LUMBAR: Status: ACTIVE | Noted: 2019-04-16

## 2019-04-16 PROBLEM — M47.816 SPONDYLOSIS WITHOUT MYELOPATHY OR RADICULOPATHY, LUMBAR REGION: Status: ACTIVE | Noted: 2019-04-16

## 2019-04-16 PROBLEM — E66.01 MORBID OBESITY DUE TO EXCESS CALORIES (HCC): Status: ACTIVE | Noted: 2019-04-16

## 2019-04-16 PROBLEM — M51.369 DDD (DEGENERATIVE DISC DISEASE), LUMBAR: Status: ACTIVE | Noted: 2019-04-16

## 2019-04-16 NOTE — PROGRESS NOTES
"Chief Complaint: \"Pain in my lower back.\"       History of Present Illness:   Patient: Ms. Casandra Trinidad, 69 y.o. female   Referring physician: Dr. Melton   Reason for referral: Consultation for intractable chronic low back pain.   Pain history: Patient reports a 10-year history of lower back pain, which began without incident. Pain has progressed in intensity over the past 12 months.   Pain description: constant pain with intermittent exacerbation, described as burning and stabbing sensation.   Radiation of pain: The lower back pain radiates into the buttocks LT>RT  Pain intensity today: 5/10  Average pain intensity last week: 7/10  Pain intensity ranges from: 2/10 to 9/10  Aggravating factors: Pain increases with standing longer than 5 minutes and ambulating more than 5 minutes.  Alleviating factors: Pain decreases with sitting and lying.     Associated symptoms:   Patient reports numbness and weakness in the bilateral lower extremities.   Patient denies any new bladder or bowel problems.   Patient reports difficulties with her balance but denies recent falls.      Review of previous therapies and additional medical records:  Casandra Trinidad has already failed the following measures, including:   Conservative measures: oral analgesics and physical therapy   Interventional measures: None  Surgical measures: No history of lumbar spine or hip surgery  Casandra Trinidad underwent neurosurgical consultation with Dr. Funez on 02/26/2018, and was found not to be a surgical candidate due to her BMI.  Casandra Trinidad presents with significant comorbidities including CLAUDIA, BPV, morbid obesity, fibromyalgia engaged in treatment.  In terms of current analgesics, Casandra Trinidad takes: Tramadol, Lidocaine patch, meloxicam, CBD. Patient also takes Zoloft   I have reviewed [facility] Report #[id number] consistent to medication reconciliation.     Global Pain Scale 04-18 2019                 Pain  17               "   Feelings  5                 Clinical outcomes  17                 Activities  22                 GPS Total:  61                    Review of Diagnostic Studies:    MRI LUMBAR SPINE WO CONTRAST- 01/08/2018. There is increased lumbar lordosis. There is mild anterolisthesis of L3 from L4 and L4 from L5. There is diffuse narrowing of the AP dimension of the neural canal from T12 through L4. Collapse of the disc spaces L2-3, L3-4 and L4-5. There is no marrow edema, marrow tumor, compression fracture or evidence of high-grade malalignment. Transaxial datasets:  T12-L1: soft disc protrusion producing defect on the central dural sac and lateral recesses which is compounded by facet arthropathy and hypertrophy.  L1-L2: hard and soft disc protrusion compressing the central dural sac and this is compounded by marked arthropathy hypertrophy and bossing from the facets with ligamentum flaval hypertrophy. Therefore there is severe central and lateral recess impingement stenosis at L1-L2.  L2-L3: disc space collapse with a soft disc protrusion, facet hypertrophy and ligamentum flaval disease producing moderately severe central and lateral recess impingement stenosis.  L3-L4: anterolisthesis of L3 from L4, marked facet enlargement hypertrophy bossing and impingement with ligamentum flaval hypertrophy producing critical high-grade central and lateral recess impingement stenosis at the L3-L4 level.  L4-L5: soft disc protrusion with anterolisthesis of L4 from L5 combined with facet disease producing moderately severe central and moderately severe lateral recess stenosis, worse on the right than left.  L5-S1: mild soft disc protrusion effacing the central and leftward dural sac with disc space narrowing.     Review of Systems   Constitutional: Positive for activity change.   Musculoskeletal: Positive for back pain.   Neurological: Positive for dizziness, weakness and numbness.   All other systems reviewed and are negative.         Patient Active Problem List   Diagnosis   • Menopause   • Generalized osteoarthrosis, involving multiple sites   • Esophageal reflux   • CLAUDIA (generalized anxiety disorder)   • Spondylosis without myelopathy or radiculopathy, lumbar region   • Lumbar stenosis with neurogenic claudication   • Spondylolisthesis of lumbar region   • DDD (degenerative disc disease), lumbar   • Morbid obesity due to excess calories (CMS/[facility])   • History of bilateral knee replacement   • History of bariatric surgery   • Physical deconditioning       Past Medical History:   Diagnosis Date   • Age related cataract    • Arthropathy, lower leg    • Dehiscence of incision    • Ear itch    • Myalgia and myositis    • Pain in joint, ankle and foot    • Pain in joint, hand    • Pain in joint, lower leg    • Tenosynovitis of finger    • Vitamin D deficiency          Past Surgical History:   Procedure Laterality Date   • BREAST LUMPECTOMY Right 01/2014         Family History   Problem Relation Age of Onset   • COPD Mother    • COPD Father    • COPD Sister          Social History     Socioeconomic History   • Marital status:      Spouse name: Not on file   • Number of children: Not on file   • Years of education: Not on file   • Highest education level: Not on file   Tobacco Use   • Smoking status: Never Smoker   • Smokeless tobacco: Never Used   Substance and Sexual Activity   • Alcohol use: No   • Drug use: No           Current Outpatient Medications:   •  aspirin 81 MG EC tablet, Take 81 mg by mouth daily., Disp: , Rfl:   •  Calcium Carbonate-Vitamin D (CALCIUM 600+D) 600-200 MG-UNIT tablet, Take 1 tablet by mouth 2 (two) times a day., Disp: , Rfl:   •  CBD (cannabidiol) oral oil, Take  by mouth., Disp: , Rfl:   •  lidocaine (LIDODERM) 5 %, Place 3 patches on the skin as directed by provider Daily. Remove & Discard patch within 12 hours or as directed by MD, Disp: 270 patch, Rfl: 3  •  meclizine (ANTIVERT) 25 MG tablet, Take 1 tablet by  "mouth 3 (Three) Times a Day As Needed for dizziness., Disp: 90 tablet, Rfl: 3  •  meloxicam (MOBIC) 15 MG tablet, Take 1 tablet by mouth Daily., Disp: 90 tablet, Rfl: 3  •  Multiple Vitamins-Minerals (ICAPS AREDS 2 PO), Take 1 capsule by mouth every night., Disp: , Rfl:   •  omeprazole (priLOSEC) 20 MG capsule, Take 1 capsule by mouth Daily., Disp: 90 capsule, Rfl: 3  •  sertraline (ZOLOFT) 25 MG tablet, Take 1 tablet by mouth Daily., Disp: 90 tablet, Rfl: 3  •  tiZANidine (ZANAFLEX) 2 MG tablet, Take 1 tablet by mouth 3 (Three) Times a Day., Disp: 270 tablet, Rfl: 1  •  traMADol (ULTRAM) 50 MG tablet, Take 1 tablet by mouth 2 (Two) Times a Day As Needed for Moderate Pain ., Disp: 180 tablet, Rfl: 1      Allergies   Allergen Reactions   • Codeine GI Intolerance   • Diclofenac Nausea And Vomiting   • Lortab [Hydrocodone-Acetaminophen] Nausea And Vomiting   • Tyloxapol Nausea And Vomiting         /64   Pulse 68   Temp 98.2 °F (36.8 °C) (Temporal)   Resp 18   Ht 154.9 cm (61\")   Wt 109 kg (241 lb)   LMP  (LMP Unknown)   SpO2 98%   BMI 45.54 kg/m²       Physical Exam:  Constitutional: Patient is oriented to person, place, and time. Patient appears well-developed and well-nourished.   HEENT: Head: Normocephalic and atraumatic. Eyes: Conjunctivae and lids are normal. Pupils: Equal, round, reactive to light.   Neck: Trachea normal. Neck supple. No JVD present.   Pulmonary Respiratory effort: No increased work of breathing or signs of respiratory distress. Auscultation of lungs: Clear to auscultation.   Cardiovascular Auscultation of heart: Normal rate and rhythm, normal S1 and S2, no murmurs.   Musculoskeletal   Gait and station: Gait evaluation demonstrated shuffling   Lumbar spine: Passive and active range of motion are limited secondary to pain. Extension and rotation of the lumbar spine increased and reproduced pain. Lumbar facet joint loading maneuvers are positive.  Sachin and Gaenslen's tests are " negative   Piriformis maneuvers are negative   Palpation of the bilateral ischial tuberosities, unrevealing   Palpation of the bilateral greater trochanter, unrevealing   Examination of the Iliotibial band: unrevealing   Hip joints: The range of motion of the hip joints is full and without pain   Neurological: Patient is alert and oriented to person, place, and time. Speech: speech is normal. Cortical function: Normal mental status.   Cranial nerves: Cranial nerves 2-12 intact.   Reflex Scores:  Right patellar: 2+  Left patellar: 2+  Right Achilles: 2+  Left Achilles: 2+  Motor strength: 5/5  Motor Tone: normal tone.   Involuntary movements: none.   Superficial/Primitive Reflexes: primitive reflexes were absent.   Right Mcdonnell: absent  Left Mcdonnell: absent  Right ankle clonus: absent  Left ankle clonus: absent   Negative long tract signs. Straight leg raising test is negative. Femoral stretch sign is negative.   Sensation: No sensory loss. Sensory exam: intact to light touch, intact pain and temperature sensation, intact vibration sensation and normal proprioception.   Coordination: Normal finger to nose and heel to shin. Normal balance and negative Romberg's sign   Skin and subcutaneous tissue: Skin is warm and intact. No rash noted. No cyanosis.   Psychiatric: Judgment and insight: Normal. Orientation to person, place and time: Normal. Recent and remote memory: Intact. Mood and affect: Normal.     ASSESSMENT:   1. Lumbar stenosis with neurogenic claudication    2. Spondylolisthesis of lumbar region    3. Spondylosis without myelopathy or radiculopathy, lumbar region    4. DDD (degenerative disc disease), lumbar    5. History of bilateral knee replacement    6. Generalized osteoarthrosis, involving multiple sites    7. History of bariatric surgery    8. Morbid obesity due to excess calories (CMS/HCC)    9. CLAUDIA (generalized anxiety disorder)    10. Physical deconditioning        PLAN/MEDICAL DECISION MAKING: I had  a lengthy conversation with Ms. Casandra Trinidad regarding her chronic pain condition and potential therapeutic options including risks, benefits, alternative therapies, to name a few. Patient has failed to obtain pain relief with conservative measures, as referenced above. MRI of the lumbar spine on 01- revealed grade 1 spondylolisthesis at L4 on L5, and L3 on L4. There is central spinal stenosis at multiple levels, most severe at L3-L4, L4-L5, L2-L3. There is lateral recess stenosis on the right at L4-L5. There is disc degeneration throughout the lumbar region. Lumbar facet arthropathy.  Patient has been found to be a surgical candidate for lumbar surgery provided that her BMI gets within a more normal range. However, patient has declined the possibility of surgery. Patient has a history of lap band procedure. I have reviewed all available patient's medical records as well as previous therapies as referenced above.  Therefore, I have proposed the following plan:  1. Diagnostic studies: Flexion and extension X-rays of the lumbar spine   2. Pharmacological measures: Reviewed. Discussed.   A. Patient takes Tramadol, Lidocaine patch, meloxicam, CBD. Patient also takes Zoloft   B. Trial with Rheumate one tablet twice daily  C. Start pyridoxine 100 mg one tablet by mouth daily  3. Interventional pain management measures: Patient will be scheduled for diagnostic and therapeutic bilateral L3-L4 transforaminal epidural steroid injections. We may repeat another epidural depending on patient's outcome. Patient has declined the possibility of surgery. She also reports that she is not able to move forward with weight loss. She is status post lap band.   Patient appears to be an appropriate candidate for a spinal cord stimulator trial if failure to obtain relief with minimally invasive interventional pain management and conservative measures. A MILD or Superion could also be alternatives depending on stability of her  spine on flex ext films  4. Long-term rehabilitation efforts:  A. The patient does not have a history of falls. I did complete a risk assessment for falls.   B. Patient will start a comprehensive physical therapy program for water therapy, therapeutic exercise, core strengthening, gait and balance training and neurodynamics   C. Start an exercise program such as water therapy  D. Contrast therapy: Apply ice-packs for 15-20 minutes, followed by heating pads for 15-20 minutes to affected area   E. Referral to Dr. Eloy Pearce for psychological screening for spinal cord stimulation and intrathecal therapies.  F. Referral to [facility]  G. Follow-up with bariatric surgeon to assess if lap band still working and could be adjusted  5. The patient has been instructed to contact my office with any questions or difficulties. The patient understands the plan and agrees to proceed accordingly.         Patient Care Team:  Tony Melton MD as PCP - General (Family Medicine)  Tony Melton MD as PCP - [facility]  Sachin Schumacher MD PhD as Consulting Physician (Orthopedic Surgery)  Gagandeep Dc MD as Consulting Physician (Pain Medicine)     No orders of the defined types were placed in this encounter.        No future appointments.      Gaagndeep Dc MD     EMR Dragon/Transcription disclaimer:  Much of this encounter note is an electronic transcription of spoken language to printed text. Electronic transcription of spoken language may permit erroneous, or at times, nonsensical words or phrases to be inadvertently transcribed. Although I have reviewed the note for such errors, some may still exist.

## 2019-04-18 ENCOUNTER — OFFICE VISIT (OUTPATIENT)
Dept: PAIN MEDICINE | Facility: CLINIC | Age: 69
End: 2019-04-18

## 2019-04-18 ENCOUNTER — HOSPITAL ENCOUNTER (OUTPATIENT)
Dept: GENERAL RADIOLOGY | Facility: HOSPITAL | Age: 69
Discharge: HOME | End: 2019-04-18
Admitting: ANESTHESIOLOGY

## 2019-04-18 VITALS
SYSTOLIC BLOOD PRESSURE: 118 MMHG | HEIGHT: 61 IN | WEIGHT: 241 LBS | OXYGEN SATURATION: 98 % | BODY MASS INDEX: 45.5 KG/M2 | DIASTOLIC BLOOD PRESSURE: 64 MMHG | TEMPERATURE: 98.2 F | HEART RATE: 68 BPM | RESPIRATION RATE: 18 BRPM

## 2019-04-18 DIAGNOSIS — Z96.653 HISTORY OF BILATERAL KNEE REPLACEMENT: ICD-10-CM

## 2019-04-18 DIAGNOSIS — M15.9 GENERALIZED OSTEOARTHROSIS, INVOLVING MULTIPLE SITES: ICD-10-CM

## 2019-04-18 DIAGNOSIS — E66.01 MORBID OBESITY DUE TO EXCESS CALORIES (HCC): ICD-10-CM

## 2019-04-18 DIAGNOSIS — Z98.84 HISTORY OF BARIATRIC SURGERY: ICD-10-CM

## 2019-04-18 DIAGNOSIS — M43.16 SPONDYLOLISTHESIS OF LUMBAR REGION: ICD-10-CM

## 2019-04-18 DIAGNOSIS — R53.81 PHYSICAL DECONDITIONING: ICD-10-CM

## 2019-04-18 DIAGNOSIS — M51.36 DDD (DEGENERATIVE DISC DISEASE), LUMBAR: ICD-10-CM

## 2019-04-18 DIAGNOSIS — M48.062 LUMBAR STENOSIS WITH NEUROGENIC CLAUDICATION: ICD-10-CM

## 2019-04-18 DIAGNOSIS — F41.1 GAD (GENERALIZED ANXIETY DISORDER): ICD-10-CM

## 2019-04-18 DIAGNOSIS — Z98.84 HISTORY OF BARIATRIC SURGERY: Primary | ICD-10-CM

## 2019-04-18 DIAGNOSIS — M47.816 SPONDYLOSIS WITHOUT MYELOPATHY OR RADICULOPATHY, LUMBAR REGION: ICD-10-CM

## 2019-04-18 DIAGNOSIS — M47.816 SPONDYLOSIS OF LUMBAR REGION WITHOUT MYELOPATHY OR RADICULOPATHY: ICD-10-CM

## 2019-04-18 DIAGNOSIS — M48.062 LUMBAR STENOSIS WITH NEUROGENIC CLAUDICATION: Primary | ICD-10-CM

## 2019-04-18 PROCEDURE — 99204 OFFICE O/P NEW MOD 45 MIN: CPT | Performed by: ANESTHESIOLOGY

## 2019-04-18 PROCEDURE — 72120 X-RAY BEND ONLY L-S SPINE: CPT

## 2019-04-18 RX ORDER — MULTIVITAMIN WITH IRON
100 TABLET ORAL DAILY
Qty: 90 TABLET | Refills: 1 | Status: SHIPPED | OUTPATIENT
Start: 2019-04-18 | End: 2019-05-13 | Stop reason: SDUPTHER

## 2019-04-18 RX ORDER — ME-TETRAHYDROFOLATE/B12/HRB236 1-1-500 MG
CAPSULE ORAL
Qty: 180 CAPSULE | Refills: 1 | Status: SHIPPED | OUTPATIENT
Start: 2019-04-18 | End: 2020-06-01

## 2019-04-22 ENCOUNTER — OUTSIDE FACILITY SERVICE (OUTPATIENT)
Dept: PAIN MEDICINE | Facility: CLINIC | Age: 69
End: 2019-04-22

## 2019-04-22 PROCEDURE — 99152 MOD SED SAME PHYS/QHP 5/>YRS: CPT | Performed by: ANESTHESIOLOGY

## 2019-04-22 PROCEDURE — 64483 NJX AA&/STRD TFRM EPI L/S 1: CPT | Performed by: ANESTHESIOLOGY

## 2019-04-23 ENCOUNTER — TELEPHONE (OUTPATIENT)
Dept: PAIN MEDICINE | Facility: CLINIC | Age: 69
End: 2019-04-23

## 2019-04-23 NOTE — TELEPHONE ENCOUNTER
Patient had dx/tx bilateral L3-L4 TFESI on 04/22/19. Patient reports that she is doing really well

## 2019-04-25 ENCOUNTER — HOSPITAL ENCOUNTER (OUTPATIENT)
Dept: NUTRITION | Facility: HOSPITAL | Age: 69
Setting detail: RECURRING SERIES
Discharge: HOME | End: 2019-04-25

## 2019-04-25 VITALS — BODY MASS INDEX: 45.12 KG/M2 | WEIGHT: 239 LBS | HEIGHT: 61 IN

## 2019-04-25 PROCEDURE — 97802 MEDICAL NUTRITION INDIV IN: CPT

## 2019-05-10 NOTE — PROGRESS NOTES
"Chief Complaint: \"Doing much better.\"     History of Present Illness:   Patient: Ms. Casandra Trinidad, 69 y.o. female, with a 10-year history of lower back pain, which began without incident.  She returns today for post-procedure follow-up.  Patient was last seen on 04/22/2019 for bilateral L3-L4 transforaminal epidural steroid injections and experienced at least 80% relief that is ongoing.  Patient is participating in Physical Therapy.  Pain description: previously constant pain with intermittent exacerbation, described as burning and stabbing sensation.   Radiation of pain: The lower back pain radiated into the buttocks (L>R)  Pain intensity today: 0/10  Average pain intensity last week: 0/10  Pain intensity ranges from: 0/10 to 8/10  Aggravating factors: Pain increased with standing longer than 5 minutes and ambulating more than 5 minutes.  Alleviating factors: Pain decreases with sitting and lying.     Associated symptoms:   Patient reports numbness and weakness in the bilateral lower extremities.   Patient denies any new bladder or bowel problems.   Patient reports difficulties with her balance but denies recent falls.      Review of previous therapies and additional medical records:  Casandra Trinidad has already failed the following measures, including:   Conservative measures: oral analgesics and physical therapy   Interventional measures: As referenced above.  Surgical measures: No history of lumbar spine or hip surgery  Casandra Trinidad underwent neurosurgical consultation with Dr. Funez on 02/26/2018, and was found not to be a surgical candidate due to her BMI.  Casandra Trinidad presents with significant comorbidities including CLAUDIA, BPV, morbid obesity, fibromyalgia engaged in treatment.  In terms of current analgesics, Casandra Trinidad takes: Tramadol, Lidocaine patch, meloxicam, CBD oil. Patient also takes Zoloft   I have reviewed [facility] Report #[id number] consistent to medication " "reconciliation.    Pain Psychology Evaluation by Dr. Pearce (05/02/2019): \"From a psychological perspective, patient is considered to be an appropriate candidate for a spinal cord stimulator at this time.\"     Global Pain Scale 04-18 2019 05-13 2019               Pain  17  5               Feelings  5  3               Clinical outcomes  17  1               Activities  22  1               GPS Total:  61  10                  Review of Diagnostic Studies:    XR SPINE LUMBAR FLEX AND EXT- 04/18/2019: Anterolisthesis of L3 on L4 and L4 on L5 which is stable in  both flexion and extension views.  MRI LUMBAR SPINE WO CONTRAST- 01/08/2018:  T12-L1: soft disc protrusion producing defect on the central dural sac and lateral recesses which is compounded by facet arthropathy and hypertrophy.  L1-L2: hard and soft disc protrusion compressing the central dural sac and this is compounded by marked arthropathy hypertrophy and bossing from the facets with ligamentum flaval hypertrophy. Therefore there is severe central and lateral recess impingement stenosis at L1-L2.  L2-L3: disc space collapse with a soft disc protrusion, facet hypertrophy and ligamentum flaval disease producing moderately severe central and lateral recess impingement stenosis.  L3-L4: anterolisthesis of L3 from L4, marked facet enlargement hypertrophy bossing and impingement with ligamentum flaval hypertrophy producing critical high-grade central and lateral recess impingement stenosis at the L3-L4 level.  L4-L5: soft disc protrusion with anterolisthesis of L4 from L5 combined with facet disease producing moderately severe central and moderately severe lateral recess stenosis, worse on the right than left.  L5-S1: mild soft disc protrusion effacing the central and leftward dural sac with disc space narrowing.     Review of Systems   Constitutional: Positive for activity change.   Musculoskeletal: Positive for arthralgias and neck stiffness.   Neurological: " Positive for dizziness, weakness and numbness.   All other systems reviewed and are negative.        Patient Active Problem List   Diagnosis   • Menopause   • Generalized osteoarthrosis, involving multiple sites   • Esophageal reflux   • CLAUDIA (generalized anxiety disorder)   • Spondylosis without myelopathy or radiculopathy, lumbar region   • Lumbar stenosis with neurogenic claudication   • Spondylolisthesis of lumbar region   • DDD (degenerative disc disease), lumbar   • Morbid obesity due to excess calories (CMS/[facility])   • History of bilateral knee replacement   • History of bariatric surgery   • Physical deconditioning       Past Medical History:   Diagnosis Date   • Age related cataract    • Arthropathy, lower leg    • Dehiscence of incision    • Ear itch    • Myalgia and myositis    • Pain in joint, ankle and foot    • Pain in joint, hand    • Pain in joint, lower leg    • Tenosynovitis of finger    • Vitamin D deficiency          Past Surgical History:   Procedure Laterality Date   • BREAST LUMPECTOMY Right 2014   •  SECTION      W/ TUBAL LIGATION   • CHOLECYSTECTOMY      LAP   • HYSTERECTOMY      W/ SUPRAPUBIC CYSTOMY/RETROPUBIC URETHROTOMY/POSTERIOR AND ANTERIOR COLORRAPHY   • LAPAROSCOPIC GASTRIC BANDING  2007    W/ HHR ([facility])   • TUBAL ABDOMINAL LIGATION           Family History   Problem Relation Age of Onset   • COPD Mother    • Emphysema Mother    • Diabetes Mother    • Heart failure Mother    • COPD Father    • Pneumonia Father    • Mental illness Father    • COPD Sister          Social History     Socioeconomic History   • Marital status:      Spouse name: Not on file   • Number of children: Not on file   • Years of education: Not on file   • Highest education level: Not on file   Tobacco Use   • Smoking status: Never Smoker   • Smokeless tobacco: Never Used   Substance and Sexual Activity   • Alcohol use: No   • Drug use: No           Current Outpatient  "Medications:   •  aspirin 81 MG EC tablet, Take 81 mg by mouth daily., Disp: , Rfl:   •  Calcium Carbonate-Vitamin D (CALCIUM 600+D) 600-200 MG-UNIT tablet, Take 1 tablet by mouth 2 (two) times a day., Disp: , Rfl:   •  CBD (cannabidiol) oral oil, Take  by mouth., Disp: , Rfl:   •  Dietary Management Product (RHEUMATE) capsule, Take 1 capsule twice daily, Disp: 180 capsule, Rfl: 1  •  lidocaine (LIDODERM) 5 %, Place 3 patches on the skin as directed by provider Daily. Remove & Discard patch within 12 hours or as directed by MD, Disp: 270 patch, Rfl: 3  •  meclizine (ANTIVERT) 25 MG tablet, Take 1 tablet by mouth 3 (Three) Times a Day As Needed for dizziness., Disp: 90 tablet, Rfl: 3  •  meloxicam (MOBIC) 15 MG tablet, Take 1 tablet by mouth Daily., Disp: 90 tablet, Rfl: 3  •  Multiple Vitamins-Minerals (ICAPS AREDS 2 PO), Take 1 capsule by mouth every night., Disp: , Rfl:   •  omeprazole (priLOSEC) 20 MG capsule, Take 1 capsule by mouth Daily., Disp: 90 capsule, Rfl: 3  •  sertraline (ZOLOFT) 25 MG tablet, Take 1 tablet by mouth Daily., Disp: 90 tablet, Rfl: 3  •  tiZANidine (ZANAFLEX) 2 MG tablet, Take 1 tablet by mouth 3 (Three) Times a Day., Disp: 270 tablet, Rfl: 1  •  traMADol (ULTRAM) 50 MG tablet, Take 1 tablet by mouth 2 (Two) Times a Day As Needed for Moderate Pain ., Disp: 180 tablet, Rfl: 1  •  vitamin B-6 (PYRIDOXINE) 100 MG tablet, Take 1 tablet by mouth Daily., Disp: 90 tablet, Rfl: 1      Allergies   Allergen Reactions   • Codeine GI Intolerance   • Diclofenac Nausea And Vomiting   • Lortab [Hydrocodone-Acetaminophen] Nausea And Vomiting   • Tyloxapol Nausea And Vomiting         /72   Pulse 73   Temp 97.1 °F (36.2 °C) (Temporal)   Resp 18   Ht 154.9 cm (61\")   Wt 106 kg (233 lb 12.8 oz)   LMP  (LMP Unknown)   SpO2 98%   BMI 44.18 kg/m²       Physical Exam:  Constitutional: Patient is oriented to person, place, and time.  Appears well-developed and well-nourished.  Obese.   Head: " Normocephalic and atraumatic. Eyes: Conjunctivae and lids are normal. Pupils: Equal, round, and reactive to light.   Neck: Trachea normal. Neck supple. No JVD present.   Pulmonary: No increased work of breathing or signs of respiratory distress.  Clear to auscultation bilaterally.   Cardiovascular: Normal rate and rhythm, normal S1 and S2, no murmurs.   Musculoskeletal   Gait and station: shuffling   Lumbar spine: The range of motion of the lumbar spine is full and without pain. Extension, flexion, lateral flexion, rotation of the lumbar spine did not increase or reproduce pain. Lumbar facet joint loading maneuvers are negative.   Sachin and Gaenslen's tests are negative   Hip joints: The range of motion of the hip joints is full and without pain   Neurological: Patient is alert and oriented to person, place, and time. Speech: speech is normal. Cortical function: Normal mental status.   Cranial nerves: Cranial nerves 2-12 intact.   Reflex Scores:  patellar: 2+ bilaterally  Achilles: 2+ bilaterally  Motor strength: 5/5  Motor Tone: normal tone.   Involuntary movements: none.   Right ankle clonus: absent  Left ankle clonus: absent   Negative long tract signs. Straight leg raising test is negative.   Sensation: No sensory loss. Sensory exam: intact to light touch, intact pain and temperature sensation, intact vibration sensation and normal proprioception.   Coordination: Normal finger to nose and heel to shin. Normal balance and negative Romberg's sign   Skin and subcutaneous tissue: Skin is warm and intact. No rash noted. No cyanosis.   Psychiatric: Judgment and insight: Normal. Orientation to person, place and time: Normal. Recent and remote memory: Intact. Mood and affect: Normal.     ASSESSMENT:   1. Lumbar stenosis with neurogenic claudication    2. Spondylolisthesis of lumbar region    3. Spondylosis without myelopathy or radiculopathy, lumbar region    4. DDD (degenerative disc disease), lumbar    5. History of  bilateral knee replacement    6. Generalized osteoarthrosis, involving multiple sites    7. History of bariatric surgery    8. Morbid obesity due to excess calories (CMS/[facility])    9. CLAUDIA (generalized anxiety disorder)    10. Physical deconditioning        PLAN/MEDICAL DECISION MAKING: I have reviewed all available medical records as well as previous therapies as referenced above, and discussed the patient with Dr. Dc.  1. Interventional pain management measures: None indicated.  Patient could be scheduled for repeat bilateral L3-L4 transforaminal epidural steroid injections if pain recurs. We may repeat another epidural depending on patient's outcome.  Patient would be an appropriate candidate for a spinal cord stimulator trial if failure to obtain relief with interventional pain management and conservative measures.  2. Pharmacological measures: Reviewed and discussed.   B. Continue Rheumate one tablet twice daily  C. Start pyridoxine 100 mg one tablet by mouth daily  3. Long-term rehabilitation efforts:  A. Patient will continue a comprehensive physical therapy program for water therapy, therapeutic exercise, core strengthening, gait and balance training and neurodynamics   B. Start an exercise program such as water therapy  C. Contrast therapy: Apply ice-packs for 15-20 minutes, followed by heating pads for 15-20 minutes to affected area   4. The patient has been instructed to contact my office with any questions or difficulties. The patient understands the plan and agrees to proceed accordingly.         Patient Care Team:  Tony Melton MD as PCP - General (Family Medicine)  Tony Melton MD as PCP - [facility]  Sachin Schumacher MD PhD as Consulting Physician (Orthopedic Surgery)  Gagandeep Dc MD as Consulting Physician (Pain Medicine)     New Medications Ordered This Visit   Medications   • vitamin B-6 (PYRIDOXINE) 100 MG tablet     Sig: Take 1 tablet by  mouth Daily.     Dispense:  90 tablet     Refill:  1         Future Appointments   Date Time Provider Department Center   5/30/2019 10:00 AM Aga Batista RD [facility] Jason E Tewmey, PA-C     EMR Dragon/Transcription disclaimer:  Much of this encounter note is an electronic transcription of spoken language to printed text. Electronic transcription of spoken language may permit erroneous, or at times, nonsensical words or phrases to be inadvertently transcribed. Although I have reviewed the note for such errors, some may still exist.

## 2019-05-13 ENCOUNTER — OFFICE VISIT (OUTPATIENT)
Dept: PAIN MEDICINE | Facility: CLINIC | Age: 69
End: 2019-05-13

## 2019-05-13 VITALS
OXYGEN SATURATION: 98 % | RESPIRATION RATE: 18 BRPM | DIASTOLIC BLOOD PRESSURE: 72 MMHG | HEIGHT: 61 IN | TEMPERATURE: 97.1 F | BODY MASS INDEX: 44.14 KG/M2 | HEART RATE: 73 BPM | WEIGHT: 233.8 LBS | SYSTOLIC BLOOD PRESSURE: 108 MMHG

## 2019-05-13 DIAGNOSIS — M43.16 SPONDYLOLISTHESIS OF LUMBAR REGION: ICD-10-CM

## 2019-05-13 DIAGNOSIS — M15.9 GENERALIZED OSTEOARTHROSIS, INVOLVING MULTIPLE SITES: ICD-10-CM

## 2019-05-13 DIAGNOSIS — M51.36 DDD (DEGENERATIVE DISC DISEASE), LUMBAR: ICD-10-CM

## 2019-05-13 DIAGNOSIS — Z98.84 HISTORY OF BARIATRIC SURGERY: ICD-10-CM

## 2019-05-13 DIAGNOSIS — Z96.653 HISTORY OF BILATERAL KNEE REPLACEMENT: ICD-10-CM

## 2019-05-13 DIAGNOSIS — M48.062 LUMBAR STENOSIS WITH NEUROGENIC CLAUDICATION: Primary | ICD-10-CM

## 2019-05-13 DIAGNOSIS — E66.01 MORBID OBESITY DUE TO EXCESS CALORIES (HCC): ICD-10-CM

## 2019-05-13 DIAGNOSIS — M47.816 SPONDYLOSIS WITHOUT MYELOPATHY OR RADICULOPATHY, LUMBAR REGION: ICD-10-CM

## 2019-05-13 DIAGNOSIS — R53.81 PHYSICAL DECONDITIONING: ICD-10-CM

## 2019-05-13 DIAGNOSIS — F41.1 GAD (GENERALIZED ANXIETY DISORDER): ICD-10-CM

## 2019-05-13 PROCEDURE — 99213 OFFICE O/P EST LOW 20 MIN: CPT | Performed by: PHYSICIAN ASSISTANT

## 2019-05-13 RX ORDER — MULTIVITAMIN WITH IRON
100 TABLET ORAL DAILY
Qty: 90 TABLET | Refills: 1 | Status: SHIPPED | OUTPATIENT
Start: 2019-05-13 | End: 2019-10-08 | Stop reason: SDUPTHER

## 2019-05-30 ENCOUNTER — HOSPITAL ENCOUNTER (OUTPATIENT)
Dept: NUTRITION | Facility: HOSPITAL | Age: 69
Setting detail: RECURRING SERIES
Discharge: HOME | End: 2019-05-30

## 2019-05-30 VITALS — WEIGHT: 239 LBS | HEIGHT: 61 IN | BODY MASS INDEX: 45.12 KG/M2

## 2019-06-11 ENCOUNTER — TELEPHONE (OUTPATIENT)
Dept: FAMILY MEDICINE CLINIC | Facility: CLINIC | Age: 69
End: 2019-06-11

## 2019-06-11 DIAGNOSIS — R42 DIZZINESS: ICD-10-CM

## 2019-06-11 RX ORDER — MECLIZINE HYDROCHLORIDE 25 MG/1
25 TABLET ORAL 3 TIMES DAILY PRN
Qty: 90 TABLET | Refills: 3 | Status: SHIPPED | OUTPATIENT
Start: 2019-06-11 | End: 2020-12-28 | Stop reason: SDUPTHER

## 2019-06-11 NOTE — TELEPHONE ENCOUNTER
----- Message from Radha Mckeon sent at 6/11/2019  8:52 AM EDT -----  Contact: PATIENT  SHE NEEDS A REFILL SENT IN TO [facility] FOR THE FOLLOWING:    meclizine (ANTIVERT) 25 MG tablet 90 tablet     Sig - Route: Take 1 tablet by mouth 3 (Three) Times a Day As Needed for dizziness. - Oral   Associated Diagnoses     Dizziness     Pharmacy     [facility] - [address] [phone] [facility] [phone]

## 2019-06-24 ENCOUNTER — HOSPITAL ENCOUNTER (OUTPATIENT)
Dept: NUTRITION | Facility: HOSPITAL | Age: 69
Setting detail: RECURRING SERIES
Discharge: HOME | End: 2019-06-24

## 2019-06-24 VITALS — BODY MASS INDEX: 44.37 KG/M2 | WEIGHT: 235 LBS | HEIGHT: 61 IN

## 2019-06-24 PROCEDURE — 97803 MED NUTRITION INDIV SUBSEQ: CPT

## 2019-07-18 NOTE — PROGRESS NOTES
"Chief Complaint: \"Low back pain.\"     History of Present Illness:   Patient: Ms. Casandra Trinidad, 69 y.o. female, with a 10-year history of lower back pain, which began without incident.  She returns today for re-evaluation.  Patient was seen on 04/22/2019 for bilateral L3-L4 transforaminal epidural steroid injections and experienced at least 80% relief that lasted for 6 weeks.  The pain then gradually recurred.  Pain description: constant pain with intermittent exacerbation, described as burning and stabbing sensation.   Radiation of pain: The lower back pain radiates into the buttocks (L>R)  Pain intensity today: 8/10  Average pain intensity last week: 6/10  Pain intensity ranges from: 3/10 to 9/10  Aggravating factors: Pain increases with standing longer than 5 minutes and ambulating more than 5 minutes.  Alleviating factors: Pain decreases with sitting and lying.     Associated symptoms:   Patient reports numbness and weakness in the bilateral lower extremities.   Patient denies any new bladder or bowel problems.   Patient reports difficulties with her balance but denies recent falls.      Review of previous therapies and additional medical records:  Casandra Trinidad has already failed the following measures, including:   Conservative measures: oral analgesics and physical therapy   Interventional measures: As referenced above.  Surgical measures: No history of lumbar spine or hip surgery  Casandra Trinidad underwent neurosurgical consultation with Dr. Funez on 02/26/2018, and was found not to be a surgical candidate due to her BMI.  Casandra Trinidad presents with significant comorbidities including CLAUDIA, BPV, morbid obesity, fibromyalgia engaged in treatment.  In terms of current analgesics, Casandra Trinidad takes: Tramadol, Lidocaine patch, meloxicam, CBD oil.  I have reviewed [facility] Report #[id number], consistent to medication reconciliation.    Pain Psychology Evaluation by Dr. Pearce (05/02/2019): \"From a " "psychological perspective, patient is considered to be an appropriate candidate for a spinal cord stimulator at this time.\"     Global Pain Scale 04-18 2019 05-13 2019 07-19 2019             Pain  17  5  15             Feelings  5  3  11             Clinical outcomes  17  1  10             Activities  22  1  15             GPS Total:  61  10  51                Review of Diagnostic Studies:    XR SPINE LUMBAR FLEX AND EXT- 04/18/2019: Anterolisthesis of L3 on L4 and L4 on L5 which is stable in  both flexion and extension views.  MRI LUMBAR SPINE WO CONTRAST- 01/08/2018:  T12-L1: soft disc protrusion producing defect on the central dural sac and lateral recesses which is compounded by facet arthropathy and hypertrophy.  L1-L2: hard and soft disc protrusion compressing the central dural sac and this is compounded by marked arthropathy hypertrophy and bossing from the facets with ligamentum flaval hypertrophy. Therefore there is severe central and lateral recess impingement stenosis at L1-L2.  L2-L3: disc space collapse with a soft disc protrusion, facet hypertrophy and ligamentum flaval disease producing moderately severe central and lateral recess impingement stenosis.  L3-L4: anterolisthesis of L3 from L4, marked facet enlargement hypertrophy bossing and impingement with ligamentum flaval hypertrophy producing critical high-grade central and lateral recess impingement stenosis at the L3-L4 level.  L4-L5: soft disc protrusion with anterolisthesis of L4 from L5 combined with facet disease producing moderately severe central and moderately severe lateral recess stenosis, worse on the right than left.  L5-S1: mild soft disc protrusion effacing the central and leftward dural sac with disc space narrowing.     Review of Systems   Constitutional: Positive for activity change.   Musculoskeletal: Positive for arthralgias and neck stiffness.   Neurological: Positive for dizziness, weakness and numbness.   All other " systems reviewed and are negative.        Patient Active Problem List   Diagnosis   • Menopause   • Generalized osteoarthrosis, involving multiple sites   • Esophageal reflux   • CLAUDIA (generalized anxiety disorder)   • Spondylosis without myelopathy or radiculopathy, lumbar region   • Lumbar stenosis with neurogenic claudication   • Spondylolisthesis of lumbar region   • DDD (degenerative disc disease), lumbar   • Morbid obesity due to excess calories (CMS/[facility])   • History of bilateral knee replacement   • History of bariatric surgery   • Physical deconditioning       Past Medical History:   Diagnosis Date   • Age related cataract    • Arthropathy, lower leg    • Dehiscence of incision    • Ear itch    • Myalgia and myositis    • Pain in joint, ankle and foot    • Pain in joint, hand    • Pain in joint, lower leg    • Tenosynovitis of finger    • Vitamin D deficiency          Past Surgical History:   Procedure Laterality Date   • BREAST LUMPECTOMY Right 2014   •  SECTION      W/ TUBAL LIGATION   • CHOLECYSTECTOMY      LAP   • HYSTERECTOMY      W/ SUPRAPUBIC CYSTOMY/RETROPUBIC URETHROTOMY/POSTERIOR AND ANTERIOR COLORRAPHY   • LAPAROSCOPIC GASTRIC BANDING  2007    W/ HHR ([facility])   • TUBAL ABDOMINAL LIGATION           Family History   Problem Relation Age of Onset   • COPD Mother    • Emphysema Mother    • Diabetes Mother    • Heart failure Mother    • COPD Father    • Pneumonia Father    • Mental illness Father    • COPD Sister          Social History     Socioeconomic History   • Marital status:      Spouse name: Not on file   • Number of children: Not on file   • Years of education: Not on file   • Highest education level: Not on file   Tobacco Use   • Smoking status: Never Smoker   • Smokeless tobacco: Never Used   Substance and Sexual Activity   • Alcohol use: No   • Drug use: No           Current Outpatient Medications:   •  aspirin 81 MG EC tablet, Take 81 mg by mouth daily.,  Disp: , Rfl:   •  Calcium Carbonate-Vitamin D (CALCIUM 600+D) 600-200 MG-UNIT tablet, Take 1 tablet by mouth 2 (two) times a day., Disp: , Rfl:   •  CBD (cannabidiol) oral oil, Take  by mouth., Disp: , Rfl:   •  Dietary Management Product (RHEUMATE) capsule, Take 1 capsule twice daily, Disp: 180 capsule, Rfl: 1  •  lidocaine (LIDODERM) 5 %, Place 3 patches on the skin as directed by provider Daily. Remove & Discard patch within 12 hours or as directed by MD, Disp: 270 patch, Rfl: 3  •  meclizine (ANTIVERT) 25 MG tablet, Take 1 tablet by mouth 3 (Three) Times a Day As Needed for dizziness., Disp: 90 tablet, Rfl: 3  •  meloxicam (MOBIC) 15 MG tablet, Take 1 tablet by mouth Daily., Disp: 90 tablet, Rfl: 3  •  Multiple Vitamins-Minerals (ICAPS AREDS 2 PO), Take 1 capsule by mouth every night., Disp: , Rfl:   •  omeprazole (priLOSEC) 20 MG capsule, Take 1 capsule by mouth Daily., Disp: 90 capsule, Rfl: 3  •  sertraline (ZOLOFT) 25 MG tablet, Take 1 tablet by mouth Daily., Disp: 90 tablet, Rfl: 3  •  tiZANidine (ZANAFLEX) 2 MG tablet, Take 1 tablet by mouth 3 (Three) Times a Day., Disp: 270 tablet, Rfl: 1  •  traMADol (ULTRAM) 50 MG tablet, Take 1 tablet by mouth 2 (Two) Times a Day As Needed for Moderate Pain ., Disp: 180 tablet, Rfl: 1  •  vitamin B-6 (PYRIDOXINE) 100 MG tablet, Take 1 tablet by mouth Daily., Disp: 90 tablet, Rfl: 1      Allergies   Allergen Reactions   • Codeine GI Intolerance   • Diclofenac Nausea And Vomiting   • Lortab [Hydrocodone-Acetaminophen] Nausea And Vomiting   • Tyloxapol Nausea And Vomiting         /82   Pulse 84   Temp 98.2 °F (36.8 °C) (Oral)   Resp 24   Wt 107 kg (236 lb)   LMP  (LMP Unknown)   BMI 44.62 kg/m²       Physical Exam:  Constitutional: Patient is oriented to person, place, and time.  Appears well-developed and well-nourished.  Morbidly obese.   Head: Normocephalic and atraumatic. Eyes: Conjunctivae and lids are normal. Pupils: Equal, round, and reactive to light.    Neck: Trachea normal. Neck supple. No JVD present.   Pulmonary: No increased work of breathing or signs of respiratory distress.  Clear to auscultation bilaterally.   Cardiovascular: Normal rate and rhythm, normal S1 and S2, no murmurs.   Musculoskeletal   Gait and station: shuffling   Lumbar spine: Passive and active range of motion are limited secondary to pain. Extension and rotation of the lumbar spine increased and reproduced pain. Lumbar facet joint loading maneuvers are positive.  Sachin and Gaenslen's tests are negative   Hip joints: The range of motion of the hip joints is full and without pain   Neurological: Patient is alert and oriented to person, place, and time. Speech: speech is normal. Cortical function: Normal mental status.   Cranial nerves: Cranial nerves 2-12 intact.   Reflex Scores:  patellar: 2+ bilaterally  Achilles: 2+ bilaterally  Motor strength: 5/5  Motor Tone: normal tone.   Involuntary movements: none.   Right ankle clonus: absent  Left ankle clonus: absent   Negative long tract signs. Straight leg raising test is negative.   Sensation: No sensory loss. Sensory exam: intact to light touch, intact pain and temperature sensation, intact vibration sensation and normal proprioception.   Coordination: Normal finger to nose and heel to shin. Normal balance and negative Romberg's sign   Skin and subcutaneous tissue: Skin is warm and intact. No rash noted. No cyanosis.   Psychiatric: Judgment and insight: Normal. Orientation to person, place and time: Normal. Recent and remote memory: Intact. Mood and affect: Normal.     ASSESSMENT:   1. Lumbar stenosis with neurogenic claudication    2. Spondylolisthesis of lumbar region    3. Spondylosis without myelopathy or radiculopathy, lumbar region    4. DDD (degenerative disc disease), lumbar    5. History of bilateral knee replacement    6. Generalized osteoarthrosis, involving multiple sites    7. History of bariatric surgery    8. Morbid obesity  due to excess calories (CMS/[facility])    9. Physical deconditioning        PLAN/MEDICAL DECISION MAKING: I have reviewed all available medical records as well as previous therapies as referenced above, and discussed the patient with Dr. Dc.  1. Interventional pain management measures: Patient will be scheduled for repeat bilateral L3-L4 transforaminal epidural steroid injections. We may repeat another epidural depending on patient's outcome.  Patient would be an appropriate candidate for a spinal cord stimulator trial if failure to obtain relief with interventional pain management and conservative measures.  2. Pharmacological measures: Reviewed and discussed.   B. Continue Rheumate one tablet twice daily  C. Start pyridoxine 100 mg one tablet by mouth daily  3. Long-term rehabilitation efforts:  A. Patient will start a comprehensive physical therapy program for water therapy, therapeutic exercise, core strengthening, gait and balance training and neurodynamics   B. Start an exercise program such as water therapy  C. Contrast therapy: Apply ice-packs for 15-20 minutes, followed by heating pads for 15-20 minutes to affected area   4. The patient has been instructed to contact my office with any questions or difficulties. The patient understands the plan and agrees to proceed accordingly.         Patient Care Team:  Tony Melton MD as PCP - General (Family Medicine)  Tony Melton MD as PCP - Claims Attributed  Sachin Schumacher MD PhD as Consulting Physician (Orthopedic Surgery)  Gagandeep Dc MD as Consulting Physician (Pain Medicine)     No orders of the defined types were placed in this encounter.        Future Appointments   Date Time Provider Department Center   7/24/2019  7:00 AM Gagandeep Dc MD [facility] None   8/15/2019 12:45 PM Aga Batista RD [facility]         Brando Womack PA-C     EMR Dragon/Transcription disclaimer:  Much of this encounter note is  an electronic transcription of spoken language to printed text. Electronic transcription of spoken language may permit erroneous, or at times, nonsensical words or phrases to be inadvertently transcribed. Although I have reviewed the note for such errors, some may still exist.

## 2019-07-19 ENCOUNTER — OFFICE VISIT (OUTPATIENT)
Dept: PAIN MEDICINE | Facility: CLINIC | Age: 69
End: 2019-07-19

## 2019-07-19 VITALS
HEART RATE: 84 BPM | WEIGHT: 236 LBS | RESPIRATION RATE: 24 BRPM | SYSTOLIC BLOOD PRESSURE: 165 MMHG | BODY MASS INDEX: 44.62 KG/M2 | TEMPERATURE: 98.2 F | DIASTOLIC BLOOD PRESSURE: 82 MMHG

## 2019-07-19 DIAGNOSIS — Z96.653 HISTORY OF BILATERAL KNEE REPLACEMENT: ICD-10-CM

## 2019-07-19 DIAGNOSIS — M48.062 LUMBAR STENOSIS WITH NEUROGENIC CLAUDICATION: Primary | ICD-10-CM

## 2019-07-19 DIAGNOSIS — M43.16 SPONDYLOLISTHESIS OF LUMBAR REGION: ICD-10-CM

## 2019-07-19 DIAGNOSIS — Z98.84 HISTORY OF BARIATRIC SURGERY: ICD-10-CM

## 2019-07-19 DIAGNOSIS — M51.36 DDD (DEGENERATIVE DISC DISEASE), LUMBAR: ICD-10-CM

## 2019-07-19 DIAGNOSIS — M15.9 GENERALIZED OSTEOARTHROSIS, INVOLVING MULTIPLE SITES: ICD-10-CM

## 2019-07-19 DIAGNOSIS — M47.816 SPONDYLOSIS WITHOUT MYELOPATHY OR RADICULOPATHY, LUMBAR REGION: ICD-10-CM

## 2019-07-19 DIAGNOSIS — R53.81 PHYSICAL DECONDITIONING: ICD-10-CM

## 2019-07-19 DIAGNOSIS — E66.01 MORBID OBESITY DUE TO EXCESS CALORIES (HCC): ICD-10-CM

## 2019-07-19 PROCEDURE — 99214 OFFICE O/P EST MOD 30 MIN: CPT | Performed by: PHYSICIAN ASSISTANT

## 2019-07-24 ENCOUNTER — OUTSIDE FACILITY SERVICE (OUTPATIENT)
Dept: PAIN MEDICINE | Facility: CLINIC | Age: 69
End: 2019-07-24

## 2019-07-24 PROCEDURE — 64483 NJX AA&/STRD TFRM EPI L/S 1: CPT | Performed by: ANESTHESIOLOGY

## 2019-07-24 PROCEDURE — 99152 MOD SED SAME PHYS/QHP 5/>YRS: CPT | Performed by: ANESTHESIOLOGY

## 2019-07-25 ENCOUNTER — TELEPHONE (OUTPATIENT)
Dept: PAIN MEDICINE | Facility: CLINIC | Age: 69
End: 2019-07-25

## 2019-07-25 NOTE — TELEPHONE ENCOUNTER
Patient had repeat bilateral L3-L4 TFESI on 07/24/2019. Spoke with patient. She reports that she is doing well. She does have some soreness and has been using ice

## 2019-08-05 ENCOUNTER — TELEPHONE (OUTPATIENT)
Dept: PAIN MEDICINE | Facility: CLINIC | Age: 69
End: 2019-08-05

## 2019-08-05 DIAGNOSIS — H81.10 BENIGN PAROXYSMAL POSITIONAL VERTIGO, UNSPECIFIED LATERALITY: Primary | ICD-10-CM

## 2019-08-05 NOTE — TELEPHONE ENCOUNTER
Order has been entered and patient has been notified     ----- Message from Gagandeep Dc MD sent at 8/5/2019 10:43 AM EDT -----  It is with physical therapy for vestibular rehabilitation (use diagnosis of benign positional vertigo)  ----- Message -----  From: Carol Lai MA  Sent: 8/5/2019   9:45 AM  To: Gagandeep Dc MD    Patient called and stated that you told her you would refer her to someone in [facility] for vertigo. I don't see a referral and not sure who she would be sent to. Please advise

## 2019-08-12 ENCOUNTER — HOSPITAL ENCOUNTER (OUTPATIENT)
Dept: PHYSICAL THERAPY | Facility: HOSPITAL | Age: 69
Setting detail: THERAPIES SERIES
Discharge: HOME | End: 2019-08-12

## 2019-08-12 DIAGNOSIS — R42 DIZZINESS: Primary | ICD-10-CM

## 2019-08-12 DIAGNOSIS — R26.89 BALANCE PROBLEM: ICD-10-CM

## 2019-08-12 PROCEDURE — 97110 THERAPEUTIC EXERCISES: CPT | Performed by: PHYSICAL THERAPIST

## 2019-08-12 PROCEDURE — 97162 PT EVAL MOD COMPLEX 30 MIN: CPT | Performed by: PHYSICAL THERAPIST

## 2019-08-12 NOTE — THERAPY EVALUATION
.Outpatient Physical Therapy Neuro Initial Evaluation  [facility]     Patient Name: Casandra Trinidad  : 1950  MRN: [id number]  Today's Date: 2019      Visit Date: 2019    Patient Active Problem List   Diagnosis   • Menopause   • Generalized osteoarthrosis, involving multiple sites   • Esophageal reflux   • CLAUDIA (generalized anxiety disorder)   • Spondylosis without myelopathy or radiculopathy, lumbar region   • Lumbar stenosis with neurogenic claudication   • Spondylolisthesis of lumbar region   • DDD (degenerative disc disease), lumbar   • Morbid obesity due to excess calories (CMS/HCC)   • History of bilateral knee replacement   • History of bariatric surgery   • Physical deconditioning        Past Medical History:   Diagnosis Date   • Age related cataract    • Arthropathy, lower leg    • Dehiscence of incision    • Ear itch    • Myalgia and myositis    • Pain in joint, ankle and foot    • Pain in joint, hand    • Pain in joint, lower leg    • Tenosynovitis of finger    • Vitamin D deficiency         Past Surgical History:   Procedure Laterality Date   • BREAST LUMPECTOMY Right 2014   •  SECTION      W/ TUBAL LIGATION   • CHOLECYSTECTOMY      LAP   • HYSTERECTOMY      W/ SUPRAPUBIC CYSTOMY/RETROPUBIC URETHROTOMY/POSTERIOR AND ANTERIOR COLORRAPHY   • LAPAROSCOPIC GASTRIC BANDING  2007    W/ HHR ([facility])   • TUBAL ABDOMINAL LIGATION           Visit Dx:     ICD-10-CM ICD-9-CM   1. Dizziness R42 780.4   2. Balance problem R26.89 781.99       Patient History     Row Name 19 0900 19 0800          History    Chief Complaint  Dizziness  -MW  —  -MW     Brief Description of Current Complaint  Pt. reports having issues with dizziness that began over 10 years ago.  Pt. had to go the hospital a couple times with it and currently reports increased issues.  Pt. has been taking Meclazene every day for the past 8 months.  Pt. was going to [facility] for her  "knees and pt was treated for vertigo and they did not see any nystagmus.  Pt. reports feeling of head fullness all the time.  Pt. has increased dizziness with head turns to the left with the dizziness lasting for seconds.  Pt. ambulates with SC b/c she feels \"off balance\".  Pt. reports having chronic back pain as well.    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  -MW  —  -MW     What clinical tests have you had for this problem?  MRI  -MW  —  -MW     Results of Clinical Tests  — \"white spots\" in brain many years ago.  -MW  —  -MW        Pain     Pain Location  Back  -MW  —  -MW     Pain at Present  1  -MW  —  -MW     Pain at Best  0  -MW  —  -MW     Pain at Worst  8  -MW  —  -MW     What Performance Factors Make the Current Problem(s) WORSE?  walking/standing  -MW  —  -MW     What Performance Factors Make the Current Problem(s) BETTER?  sitting/rest  -MW  —  -MW        Fall Risk Assessment    Any falls in the past year:  No  -MW  —  -MW        Daily Activities    Primary Language  English  -MW  —     Are you able to read  Yes  -MW  —     Are you able to write  Yes  -MW  —     Teaching needs identified  Home Exercise Program  -MW  —     Recommended Referrals  — ENT, neurologist  -MW  —     Pt Participated in POC and Goals  Yes  -MW  —        Safety    Are you being hurt, hit, or frightened by anyone at home or in your life?  No  -MW  —     Are you being neglected by a caregiver  No  -MW  —       User Key  (r) = Recorded By, (t) = Taken By, (c) = Cosigned By    Initials Name Provider Type    Noemi Rodney PT Physical Therapist              PT Neuro     Row Name 08/12/19 0900 08/12/19 0800          Subjective Comments    Subjective Comments  \"I'm seeing the doctor for my back. I've been getting injections.\"  -MW  —        Subjective Pain    " Pre-Treatment Pain Level  1  -MW  —     Post-Treatment Pain Level  3  -MW  —        Home Living    Living Arrangements  house  -MW  —  -MW     Home Accessibility  stairs to enter home  -MW  —  -MW     Number of Stairs, Main Entrance  one  -MW  —  -MW     Stair Railings, Main Entrance  none  -MW  —  -MW     Stairs, Within Home, Primary  — stairs to basement and pt does not perform  -MW  —  -MW     Home Equipment  Cane;Rollator;Grab bars  -MW  —  -MW     Living Environment Comment  lives with  and dtr lives with her  -MW  —  -MW        Vision-Basic Assessment    Current Vision  Wears glasses only for reading  -MW  —  -MW     Visual History  Cataracts;Corrective eye surgery  -MW  —  -MW        Cognition    Overall Cognitive Status  Impaired  -MW  —  -MW     Memory  Decreased short term memory;Decreased long term memory per pt report  -MW  —  -MW        Sensation    Light Touch  Partial deficits in the LLE  -MW  —  -MW     Additional Comments  pt reports diminished L lower limb and left toes  -MW  —  -MW        Proprioception    Proprioception  WNL  -MW  —        Posture/Observations    Alignment Options  Rounded shoulders;Lumbar lordosis  -MW  —     Rounded Shoulders  Bilateral:;Mild  -MW  —     Lumbar lordosis  Moderate  -MW  —     Observations  — increased abdominal mass  -MW  —        Coordination    Coordination WNL?  WNL  -MW  —     Coordination Tests  Rapid Alternating  -MW  —     Rapid Alternating  Bilteral:;Intact  -MW  —        General ROM    GENERAL ROM COMMENTS  WNL  -MW  —        MMT (Manual Muscle Testing)    Rt Lower Ext  Rt Hip Flexion;Rt Hip ABduction;Rt Hip ADduction;Rt Knee Extension;Rt Knee Flexion;Rt Ankle Dorsiflexion;Rt Ankle Plantarflexion  -MW  —     Lt Lower Ext  Lt Hip Flexion;Lt Hip ABduction;Lt Hip ADduction;Lt Knee Extension;Lt Knee Flexion;Lt Ankle Plantarflexion;Lt Ankle Dorsiflexion  -MW  —        MMT Right Lower Ext    Rt Hip Flexion MMT, Gross Movement  (3+/5) fair plus  -MW  —      Rt Hip ABduction MMT, Gross Movement  (3+/5) fair plus  -MW  —     Rt Hip ADduction MMT, Gross Movement  (3+/5) fair plus  -MW  —     Rt Knee Extension MMT, Gross Movement  (4-/5) good minus  -MW  —     Rt Knee Flexion MMT, Gross Movement  (4-/5) good minus  -MW  —     Rt Ankle Plantarflexion MMT, Gross Movement  (4-/5) good minus  -MW  —     Rt Ankle Dorsiflexion MMT, Gross Movement  (4-/5) good minus  -MW  —        MMT Left Lower Ext    Lt Hip Flexion MMT, Gross Movement  (3/5) fair  -MW  —     Lt Hip ABduction MMT, Gross Movement  (3/5) fair  -MW  —     Lt Hip ADduction MMT, Gross Movement  (3/5) fair  -MW  —     Lt Knee Extension MMT, Gross Movement  (3+/5) fair plus  -MW  —     Lt Knee Flexion MMT, Gross Movement  (3+/5) fair plus  -MW  —     Lt Ankle Plantarflexion MMT, Gross Movement  (3+/5) fair plus  -MW  —     Lt Ankle Dorsiflexion MMT, Gross Movement  (3+/5) fair plus  -MW  —        Bed Mobility Assessment/Treatment    Comment (Bed Mobility)  mod I  -MW  —        Transfers    Bed-Chair [place] (Transfers)  supervision  -MW  —     Chair-Bed [place] (Transfers)  supervision  -MW  —     Sit-Stand [place] (Transfers)  supervision  -MW  —     Stand-Sit [place] (Transfers)  supervision  -MW  —     Transfer, Safety Issues  balance decreased during turns  -MW  —        Gait/Stairs Assessment/Training    [place] Level (Gait)  contact guard  -MW  —     Distance in Feet (Gait)  150  -MW  —     Deviations/Abnormal Patterns (Gait)  ceasar decreased waddling gait  -MW  —     Bilateral Gait Deviations  forward flexed posture  -MW  —     Left Sided Gait Deviations  foot drop/toe drag occasional foot drop with fatigue  -MW  —     Handrail Location (Stairs)  both sides  -MW  —     Number of Steps (Stairs)  12  -MW  —     Ascending Technique (Stairs)  step-over-step  -MW  —     Descending Technique (Stairs)  step-over-step  -MW  —     Stairs, Safety Issues  balance decreased during turns  -MW  —      Stairs, Impairments  pain;impaired balance  -MW  —       User Key  (r) = Recorded By, (t) = Taken By, (c) = Cosigned By    Initials Name Provider Type    Noemi Rodney, PT Physical Therapist                  Therapy Education  Education Details: educated pt on seeing ENT and possibly neurologist to determine cause of dizziness  Given: HEP  Program: New  How Provided: Verbal, Demonstration, Written  Provided to: Patient  Level of Understanding: Verbalized, Demonstrated    PT OP Goals     Row Name 08/12/19 0900          PT Short Term Goals    STG Date to Achieve  09/23/19  -MW     STG 1  Patient to improve MOURA balance score to >/= 40/56 to decrease client's risk of falls.  -MW     STG 1 Progress  New  -MW     STG 2  Patient to perform TUG within 10 sec without LOB for improved functional mobility.  -MW     STG 2 Progress  New  -MW     STG 3  Patient to improve FGA score to >/= 19/30 to decrease client's risk of falls.  -MW     STG 3 Progress  New  -MW     STG 4  Patient to ambulate 10 meters without AD within 12 sec without LOB for improved gait ceasar and functional mobility.  -MW     STG 4 Progress  New  -MW        Long Term Goals    LTG Date to Achieve  11/04/19  -MW     LTG 1  Patient to improve MOURA balance score to >/= 47/56 to decrease client's risk of falls.  -MW     LTG 1 Progress  New  -MW     LTG 2  Patient to perform TUG within 9 sec without LOB for improved functional mobility.  -MW     LTG 2 Progress  New  -MW     LTG 3  Patient to improve FGA score to >/= 24/30 to decrease client's risk of falls.  -MW     LTG 3 Progress  New  -MW     LTG 4  Patient to ambulate 10 meters without AD within 11 sec without LOB for improved gait ceasar and functional mobility.  -MW     LTG 4 Progress  New  -MW     LTG 5  Pt. to report a Dizziness Handicap Score to be no >/= 35  for improved functional mobility.  -MW     LTG 5 Progress  New  -MW     LTG 6  Pt. to report motion sensitivity quotient score to be no  "greater than 12 for improved activity tolerance.  -MW     LTG 6 Progress  New  -MW     LTG 7  Patient to be I with HEP.  -MW     LTG 7 Progress  New  -MW        Time Calculation    PT Goal Re-Cert Due Date  11/10/19  -MW       User Key  (r) = Recorded By, (t) = Taken By, (c) = Cosigned By    Initials Name Provider Type    Noemi Rodney, PT Physical Therapist          PT Assessment/Plan     Row Name 08/12/19 0900          PT Assessment    Functional Limitations  Impaired gait;Impaired locomotion;Limitations in community activities  -MW     Impairments  Balance;Cognition;Endurance;Gait;Pain;Muscle strength;Locomotion;Posture;Sensation  -MW     Assessment Comments  Pt. presents with evolving symptoms following dizziness diagnosis.  Pts reports of dizziness are not consistant with any inner ear disturbance or cervicogenic dizziness.  Pt would benefit from an ENT examination to determine if there is another cause of dizziness.  Pt. may benefit from a visit to MS specialist neurologist if ENT does not determine dizziness cause.  Pt. reports that there were \"white spots\" on her MRI years ago.  Pt. to be put on hold for three weeks until after ENT is seen.  Pt. to perform HEP issued for balance and dizziness until next visit, see for details.  Pt. to benefit from skilled PT services to improve gait, balance, strength and decrease dizziness.  Pt. also edcuated to begin aquatic exercise to decrease back pain.  Pt. requires several seated rest breaks secondary to back pain during examination.  -MW     Please refer to paper survey for additional self-reported information  Yes  -MW     Rehab Potential  Fair  -MW     Patient/caregiver participated in establishment of treatment plan and goals  Yes  -MW     Patient would benefit from skilled therapy intervention  Yes  -MW        PT Plan    PT Frequency  1x/week  -MW     Predicted Duration of Therapy Intervention (Therapy Eval)  12 visits  -MW     Planned CPT's?  PT EVAL MOD " "COMPLELITY: 14468;PT THER PROC EA 15 MIN: 88616;PT THER ACT EA 15 MIN: 58958;PT NEUROMUSC RE-EDUCATION EA 15 MIN: 22078;PT GAIT TRAINING EA 15 MIN: 68770;PT HOT OR COLD PACK TREAT MCARE;PT ELECTRICAL STIM UNATTEND: ;PT ELECTRICAL STIM ATTD EA 15 MIN: 85183  -MW     PT Plan Comments  PT services to improve gait, balance, strength, and dizziness.  Pt. to be seen in three weeks after seeing ENT.  -MW       User Key  (r) = Recorded By, (t) = Taken By, (c) = Cosigned By    Initials Name Provider Type    Noemi Rodney, PT Physical Therapist             Exercises     Row Name 08/12/19 0900             Subjective Comments    Subjective Comments  \"I'm seeing the doctor for my back. I've been getting injections.\"  -MW         Subjective Pain    Able to rate subjective pain?  yes  -MW      Pre-Treatment Pain Level  1  -MW      Post-Treatment Pain Level  3  -MW         Total Minutes    45102 - PT Therapeutic Exercise Minutes  10  -MW         Exercise 1    Exercise Name 1  Issued and performed HEP, see for details  -MW      Time 1  10 min  -MW        User Key  (r) = Recorded By, (t) = Taken By, (c) = Cosigned By    Initials Name Provider Type    Noemi Rodney, PT Physical Therapist                      Outcome Measure Options: 10 Meter Walk, Cochran Balance, Timed Up and Go (TUG), FGA (Functional Gait Assessment), Motion Sensitivity Score, Dizziness Handicap Inventory, Other Outcome Measure  10 Meter Walk Test Self-Selected Velocity  Self-Selected Velocity: Trial 1: 13.55 sec.  Cochran Balance Scale  Sitting to Standing: able to stand independently using hands  Standing Unsupported: able to stand 2 minutes with supervision  Sitting with Back Unsupported but Feet Supported on Floor or on Stool: able to sit safely and securely for 2 minutes  Standing to Sitting: controls descent by using hands  Transfers: able to transfer safely definite need of hands  Standing Unsupported with Eyes Closed: needs help to keep from " falling  Standing Unsupported with Feet Together: able to place feet together independently but unable to hold for 30 seconds  Reaching Forward with Outstretched Arm While Standing: can reach forward confidently 25 cm (10 inches)   Object From the Floor From a Standing Position: able to  object but needs supervision  Turning to Look Behind Over Left and Right Shoulders While Standing: needs assist to keep from losing balance or falling  Turn 360 Degrees: needs close supervision or verbal cuing  Place Alternate Foot on Step or Stool While Standing Unsupported: able to complete 4 steps without aid with supervision  Standing Unsupported with One Foot in Front: able to take small step independently and hold 30 seconds  Standing on One Leg: tries to lift leg unable to hold 3 seconds but remains standing independently  Cochran Total Score: 31  Functional Gait Assessment (FGA)  Gait Level Surface: Moderate Impairment  Change in Gait Speed: Moderate Impairment  Gait with Horizontal Head Turns: Mild Impairment  Gait with Vertical Head Turns: Mild Impairment  Gait and Pivot Turn: Mild Impairment  Step Over Obstacle: Mild Impairment  Gait with Narrow Base of Support: Severe Impairment  Gait with Eyes Closed: Moderate Impairment  Ambulating Backwards: Moderate Impairment  Steps: Mild Impairment  FGA Total Score: 14  Motion Sensitivity   Motion Sensitivity Score/Comments: 22  Timed Up and Go (TUG)  TUG Test 1: 11.48 seconds  TUG Test 2: 10.27 seconds(with cog with pt LOB and staggering with CGA)  Other Outcome Measure Tool Used  Other Outcome Measure Tool Comments: DHI: 64    Time Calculation:   Start Time: 0845   Therapy Charges for Today     Code Description Service Date Service Provider Modifiers Qty    60098509967 HC PT THER PROC EA 15 MIN 8/12/2019 Noemi Horowitz, PT GP 1    [id number] HC PT EVAL MOD COMPLEXITY 4 8/12/2019 Nomei Horowitz, PT GP 1          PT G-Codes  Outcome Measure Options: 10 Meter Walk,  Cochran Balance, Timed Up and Go (TUG), FGA (Functional Gait Assessment), Motion Sensitivity Score, Dizziness Handicap Inventory, Other Outcome Measure  Cochran Total Score: 31  FGA Total Score: 14  TUG Test 1: 11.48 seconds  TUG Test 2: 10.27 seconds(with cog with pt LOB and staggering with CGA)         Noemi Horowitz, PT  8/12/2019

## 2019-08-12 NOTE — THERAPY EVALUATION
.Outpatient Physical Therapy Neuro Initial Evaluation  [facility]     Patient Name: Casandra Trinidad  : 1950  MRN: [id number]  Today's Date: 2019      Visit Date: 2019    Patient Active Problem List   Diagnosis   • Menopause   • Generalized osteoarthrosis, involving multiple sites   • Esophageal reflux   • CLAUDIA (generalized anxiety disorder)   • Spondylosis without myelopathy or radiculopathy, lumbar region   • Lumbar stenosis with neurogenic claudication   • Spondylolisthesis of lumbar region   • DDD (degenerative disc disease), lumbar   • Morbid obesity due to excess calories (CMS/HCC)   • History of bilateral knee replacement   • History of bariatric surgery   • Physical deconditioning        Past Medical History:   Diagnosis Date   • Age related cataract    • Arthropathy, lower leg    • Dehiscence of incision    • Ear itch    • Myalgia and myositis    • Pain in joint, ankle and foot    • Pain in joint, hand    • Pain in joint, lower leg    • Tenosynovitis of finger    • Vitamin D deficiency         Past Surgical History:   Procedure Laterality Date   • BREAST LUMPECTOMY Right 2014   •  SECTION      W/ TUBAL LIGATION   • CHOLECYSTECTOMY      LAP   • HYSTERECTOMY      W/ SUPRAPUBIC CYSTOMY/RETROPUBIC URETHROTOMY/POSTERIOR AND ANTERIOR COLORRAPHY   • LAPAROSCOPIC GASTRIC BANDING  2007    W/ HHR ([facility])   • TUBAL ABDOMINAL LIGATION           Visit Dx:     ICD-10-CM ICD-9-CM   1. Dizziness R42 780.4   2. Balance problem R26.89 781.99       Patient History     Row Name 19 0900 19 0800          History    Chief Complaint  Dizziness  -MW  --  -MW     Brief Description of Current Complaint  Pt. reports having issues with dizziness that began over 10 years ago.  Pt. had to go the hospital a couple times with it and currently reports increased issues.  Pt. has been taking Meclazene every day for the past 8 months.  Pt. was going to [facility] for her  "knees and pt was treated for vertigo and they did not see any nystagmus.  Pt. reports feeling of head fullness all the time.  Pt. has increased dizziness with head turns to the left with the dizziness lasting for seconds.  Pt. ambulates with SC b/c she feels \"off balance\".  Pt. reports having chronic back pain as well.    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  -MW  --  -MW     What clinical tests have you had for this problem?  MRI  -MW  --  -MW     Results of Clinical Tests  -- \"white spots\" in brain many years ago.  -MW  --  -MW        Pain     Pain Location  Back  -MW  --  -MW     Pain at Present  1  -MW  --  -MW     Pain at Best  0  -MW  --  -MW     Pain at Worst  8  -MW  --  -MW     What Performance Factors Make the Current Problem(s) WORSE?  walking/standing  -MW  --  -MW     What Performance Factors Make the Current Problem(s) BETTER?  sitting/rest  -MW  --  -MW        Fall Risk Assessment    Any falls in the past year:  No  -MW  --  -MW        Daily Activities    Primary Language  English  -MW  --     Are you able to read  Yes  -MW  --     Are you able to write  Yes  -MW  --     Teaching needs identified  Home Exercise Program  -MW  --     Recommended Referrals  -- ENT, neurologist  -MW  --     Pt Participated in POC and Goals  Yes  -MW  --        Safety    Are you being hurt, hit, or frightened by anyone at home or in your life?  No  -MW  --     Are you being neglected by a caregiver  No  -MW  --       User Key  (r) = Recorded By, (t) = Taken By, (c) = Cosigned By    Initials Name Provider Type    MW Noemi Horowitz, PT Physical Therapist              PT Neuro     Row Name 08/12/19 0900 08/12/19 0800          Subjective Comments    Subjective Comments  \"I'm seeing the doctor for my back. I've been getting injections.\"  -MW  --        " Subjective Pain    Pre-Treatment Pain Level  1  -MW  --     Post-Treatment Pain Level  3  -MW  --        Home Living    Living Arrangements  house  -MW  --  -MW     Home Accessibility  stairs to enter home  -MW  --  -MW     Number of Stairs, Main Entrance  one  -MW  --  -MW     Stair Railings, Main Entrance  none  -MW  --  -MW     Stairs, Within Home, Primary  -- stairs to basement and pt does not perform  -MW  --  -MW     Home Equipment  Cane;Rollator;Grab bars  -MW  --  -MW     Living Environment Comment  lives with  and dtr lives with her  -MW  --  -MW        Vision-Basic Assessment    Current Vision  Wears glasses only for reading  -MW  --  -MW     Visual History  Cataracts;Corrective eye surgery  -MW  --  -MW        Cognition    Overall Cognitive Status  Impaired  -MW  --  -MW     Memory  Decreased short term memory;Decreased long term memory per pt report  -MW  --  -MW        Sensation    Light Touch  Partial deficits in the LLE  -MW  --  -MW     Additional Comments  pt reports diminished L lower limb and left toes  -MW  --  -MW        Proprioception    Proprioception  WNL  -MW  --        Posture/Observations    Alignment Options  Rounded shoulders;Lumbar lordosis  -MW  --     Rounded Shoulders  Bilateral:;Mild  -MW  --     Lumbar lordosis  Moderate  -MW  --     Observations  -- increased abdominal mass  -MW  --        Coordination    Coordination WNL?  WNL  -MW  --     Coordination Tests  Rapid Alternating  -MW  --     Rapid Alternating  Bilteral:;Intact  -MW  --        General ROM    GENERAL ROM COMMENTS  WNL  -MW  --        MMT (Manual Muscle Testing)    Rt Lower Ext  Rt Hip Flexion;Rt Hip ABduction;Rt Hip ADduction;Rt Knee Extension;Rt Knee Flexion;Rt Ankle Dorsiflexion;Rt Ankle Plantarflexion  -MW  --     Lt Lower Ext  Lt Hip Flexion;Lt Hip ABduction;Lt Hip ADduction;Lt Knee Extension;Lt Knee Flexion;Lt Ankle Plantarflexion;Lt Ankle Dorsiflexion  -MW  --        MMT Right Lower Ext    Rt Hip Flexion  MMT, Gross Movement  (3+/5) fair plus  -MW  --     Rt Hip ABduction MMT, Gross Movement  (3+/5) fair plus  -MW  --     Rt Hip ADduction MMT, Gross Movement  (3+/5) fair plus  -MW  --     Rt Knee Extension MMT, Gross Movement  (4-/5) good minus  -MW  --     Rt Knee Flexion MMT, Gross Movement  (4-/5) good minus  -MW  --     Rt Ankle Plantarflexion MMT, Gross Movement  (4-/5) good minus  -MW  --     Rt Ankle Dorsiflexion MMT, Gross Movement  (4-/5) good minus  -MW  --        MMT Left Lower Ext    Lt Hip Flexion MMT, Gross Movement  (3/5) fair  -MW  --     Lt Hip ABduction MMT, Gross Movement  (3/5) fair  -MW  --     Lt Hip ADduction MMT, Gross Movement  (3/5) fair  -MW  --     Lt Knee Extension MMT, Gross Movement  (3+/5) fair plus  -MW  --     Lt Knee Flexion MMT, Gross Movement  (3+/5) fair plus  -MW  --     Lt Ankle Plantarflexion MMT, Gross Movement  (3+/5) fair plus  -MW  --     Lt Ankle Dorsiflexion MMT, Gross Movement  (3+/5) fair plus  -MW  --        Bed Mobility Assessment/Treatment    Comment (Bed Mobility)  mod I  -MW  --        Transfers    Bed-Chair [place] (Transfers)  supervision  -MW  --     Chair-Bed [place] (Transfers)  supervision  -MW  --     Sit-Stand [place] (Transfers)  supervision  -MW  --     Stand-Sit [place] (Transfers)  supervision  -MW  --     Transfer, Safety Issues  balance decreased during turns  -MW  --        Gait/Stairs Assessment/Training    [place] Level (Gait)  contact guard  -MW  --     Distance in Feet (Gait)  150  -MW  --     Deviations/Abnormal Patterns (Gait)  ceasar decreased waddling gait  -MW  --     Bilateral Gait Deviations  forward flexed posture  -MW  --     Left Sided Gait Deviations  foot drop/toe drag occasional foot drop with fatigue  -MW  --     Handrail Location (Stairs)  both sides  -MW  --     Number of Steps (Stairs)  12  -MW  --     Ascending Technique (Stairs)  step-over-step  -MW  --     Descending Technique (Stairs)   step-over-step  -MW  --     Stairs, Safety Issues  balance decreased during turns  -MW  --     Stairs, Impairments  pain;impaired balance  -MW  --       User Key  (r) = Recorded By, (t) = Taken By, (c) = Cosigned By    Initials Name Provider Type    Noemi Rodney PT Physical Therapist            Vestibular Eval     Row Name 08/12/19 0900             Occulomotor Exam Fixation Present    Occular ROM  Normal  -MW      Spontaneous Nystagmus  Absent  -MW      Gaze-induced Nystagmus  Absent  -MW      Head Shaking Horizontal  Absent  -MW      Smooth Pursuit  Bilateral:;Normal  -MW      Saccades  Bilateral:;Intact  -MW      Convergence  Bilateral:;Normal  -MW         Vestibulo-Occular Reflex (VOR)    VOR 1 Head Only  normal  -MW      VOR 2 Head and Object  normal  -MW      VOR to Slow Head Movement  Normal  -MW      VOR to Fast Head Movement/Head Thrust Test  Normal  -MW         VOR with Occulomotor Exam Fixation Absent     Spontaneous Nystagmus  Bilateral:;Absent  -MW         Positional Testing    Positional Testing  Without infrared goggles  -MW      [place] Right  No nystagmus  -MW      [place] Left  No nystagmus  -MW      Horizontal Roll Test Right  No nystagmus  -MW      Horizontal Roll Test Left  No nystagmus  -MW        User Key  (r) = Recorded By, (t) = Taken By, (c) = Cosigned By    Initials Name Provider Type    Noemi Rodney PT Physical Therapist            Therapy Education  Education Details: educated pt on seeing ENT and possibly neurologist to determine cause of dizziness  Given: HEP  Program: New  How Provided: Verbal, Demonstration, Written  Provided to: Patient  Level of Understanding: Verbalized, Demonstrated    PT OP Goals     Row Name 08/12/19 0900          PT Short Term Goals    STG Date to Achieve  09/23/19  -MW     STG 1  Patient to improve MOURA balance score to >/= 40/56 to decrease client's risk of falls.  -MW     STG 1 Progress  New  -MW     STG 2  Patient to perform TUG  within 10 sec without LOB for improved functional mobility.  -MW     STG 2 Progress  New  -MW     STG 3  Patient to improve FGA score to >/= 19/30 to decrease client's risk of falls.  -MW     STG 3 Progress  New  -MW     STG 4  Patient to ambulate 10 meters without AD within 12 sec without LOB for improved gait ceasar and functional mobility.  -MW     STG 4 Progress  New  -MW        Long Term Goals    LTG Date to Achieve  11/04/19  -MW     LTG 1  Patient to improve MOURA balance score to >/= 47/56 to decrease client's risk of falls.  -MW     LTG 1 Progress  New  -MW     LTG 2  Patient to perform TUG within 9 sec without LOB for improved functional mobility.  -MW     LTG 2 Progress  New  -MW     LTG 3  Patient to improve FGA score to >/= 24/30 to decrease client's risk of falls.  -MW     LTG 3 Progress  New  -MW     LTG 4  Patient to ambulate 10 meters without AD within 11 sec without LOB for improved gait ceasar and functional mobility.  -MW     LTG 4 Progress  New  -MW     LTG 5  Pt. to report a Dizziness Handicap Score to be no >/= 35  for improved functional mobility.  -MW     LTG 5 Progress  New  -MW     LTG 6  Pt. to report motion sensitivity quotient score to be no greater than 12 for improved activity tolerance.  -MW     LTG 6 Progress  New  -MW     LTG 7  Patient to be I with HEP.  -     LTG 7 Progress  New  -        Time Calculation    PT Goal Re-Cert Due Date  11/10/19  -       User Key  (r) = Recorded By, (t) = Taken By, (c) = Cosigned By    Initials Name Provider Type    Noemi Rodney, PT Physical Therapist          PT Assessment/Plan     Row Name 08/12/19 0900          PT Assessment    Functional Limitations  Impaired gait;Impaired locomotion;Limitations in community activities  -     Impairments  Balance;Cognition;Endurance;Gait;Pain;Muscle strength;Locomotion;Posture;Sensation  -     Assessment Comments  Pt. presents with evolving symptoms following dizziness diagnosis.  Pts reports  "of dizziness are not consistant with any inner ear disturbance or cervicogenic dizziness.  Pt would benefit from an ENT examination to determine if there is another cause of dizziness.  Pt. may benefit from a visit to MS specialist neurologist if ENT does not determine dizziness cause.  Pt. reports that there were \"white spots\" on her MRI years ago.  Pt. to be put on hold for three weeks until after ENT is seen.  Pt. to perform HEP issued for balance and dizziness until next visit, see for details.  Pt. to benefit from skilled PT services to improve gait, balance, strength and decrease dizziness.  Pt. also edcuated to begin aquatic exercise to decrease back pain.  Pt. requires several seated rest breaks secondary to back pain during examination.  -MW     Please refer to paper survey for additional self-reported information  Yes  -MW     Rehab Potential  Fair  -MW     Patient/caregiver participated in establishment of treatment plan and goals  Yes  -MW     Patient would benefit from skilled therapy intervention  Yes  -MW        PT Plan    PT Frequency  1x/week  -MW     Predicted Duration of Therapy Intervention (Therapy Eval)  12 visits  -MW     Planned CPT's?  PT EVAL MOD COMPLELITY: 68254;PT THER PROC EA 15 MIN: 85490;PT THER ACT EA 15 MIN: 29901;PT NEUROMUSC RE-EDUCATION EA 15 MIN: 97649;PT GAIT TRAINING EA 15 MIN: 04074;PT HOT OR COLD PACK TREAT MCARE;PT ELECTRICAL STIM UNATTEND: ;PT ELECTRICAL STIM ATTD EA 15 MIN: 73428  -MW     PT Plan Comments  PT services to improve gait, balance, strength, and dizziness.  Pt. to be seen in three weeks after seeing ENT.  -MW       User Key  (r) = Recorded By, (t) = Taken By, (c) = Cosigned By    Initials Name Provider Type    Noemi Rodney, PT Physical Therapist             Exercises     Row Name 08/12/19 0900             Subjective Comments    Subjective Comments  \"I'm seeing the doctor for my back. I've been getting injections.\"  -MW         Subjective Pain    " Able to rate subjective pain?  yes  -MW      Pre-Treatment Pain Level  1  -MW      Post-Treatment Pain Level  3  -MW         Total Minutes    88314 - PT Therapeutic Exercise Minutes  10  -MW         Exercise 1    Exercise Name 1  Issued and performed HEP, see for details  -MW      Time 1  10 min  -MW        User Key  (r) = Recorded By, (t) = Taken By, (c) = Cosigned By    Initials Name Provider Type    MW Noemi Horowitz, PT Physical Therapist                      Outcome Measure Options: 10 Meter Walk, Cochran Balance, Timed Up and Go (TUG), FGA (Functional Gait Assessment), Motion Sensitivity Score, Dizziness Handicap Inventory, Other Outcome Measure  10 Meter Walk Test Self-Selected Velocity  Self-Selected Velocity: Trial 1: 13.55 sec.  Cochran Balance Scale  Sitting to Standing: able to stand independently using hands  Standing Unsupported: able to stand 2 minutes with supervision  Sitting with Back Unsupported but Feet Supported on Floor or on Stool: able to sit safely and securely for 2 minutes  Standing to Sitting: controls descent by using hands  Transfers: able to transfer safely definite need of hands  Standing Unsupported with Eyes Closed: needs help to keep from falling  Standing Unsupported with Feet Together: able to place feet together independently but unable to hold for 30 seconds  Reaching Forward with Outstretched Arm While Standing: can reach forward confidently 25 cm (10 inches)   Object From the Floor From a Standing Position: able to  object but needs supervision  Turning to Look Behind Over Left and Right Shoulders While Standing: needs assist to keep from losing balance or falling  Turn 360 Degrees: needs close supervision or verbal cuing  Place Alternate Foot on Step or Stool While Standing Unsupported: able to complete 4 steps without aid with supervision  Standing Unsupported with One Foot in Front: able to take small step independently and hold 30 seconds  Standing on One Leg:  tries to lift leg unable to hold 3 seconds but remains standing independently  Cochran Total Score: 31  Functional Gait Assessment (FGA)  Gait Level Surface: Moderate Impairment  Change in Gait Speed: Moderate Impairment  Gait with Horizontal Head Turns: Mild Impairment  Gait with Vertical Head Turns: Mild Impairment  Gait and Pivot Turn: Mild Impairment  Step Over Obstacle: Mild Impairment  Gait with Narrow Base of Support: Severe Impairment  Gait with Eyes Closed: Moderate Impairment  Ambulating Backwards: Moderate Impairment  Steps: Mild Impairment  FGA Total Score: 14  Motion Sensitivity   Motion Sensitivity Score/Comments: 22  Timed Up and Go (TUG)  TUG Test 1: 11.48 seconds  TUG Test 2: 10.27 seconds(with cog with pt LOB and staggering with CGA)  Other Outcome Measure Tool Used  Other Outcome Measure Tool Comments: I: 64    Time Calculation:   Start Time: 0845   Therapy Charges for Today     Code Description Service Date Service Provider Modifiers Qty    [id number]  PT THER PROC EA 15 MIN 8/12/2019 Noemi Horowitz, PT GP 1    [id number]  PT EVAL MOD COMPLEXITY 4 8/12/2019 Noemi Horowitz, PT GP 1          PT G-Codes  Outcome Measure Options: 10 Meter Walk, Cochran Balance, Timed Up and Go (TUG), FGA (Functional Gait Assessment), Motion Sensitivity Score, Dizziness Handicap Inventory, Other Outcome Measure  Cochran Total Score: 31  FGA Total Score: 14  TUG Test 1: 11.48 seconds  TUG Test 2: 10.27 seconds(with cog with pt LOB and staggering with CGA)         Noemi Horowitz, PT  8/12/2019

## 2019-08-15 ENCOUNTER — HOSPITAL ENCOUNTER (OUTPATIENT)
Dept: NUTRITION | Facility: HOSPITAL | Age: 69
Setting detail: RECURRING SERIES
Discharge: HOME | End: 2019-08-15

## 2019-08-15 VITALS — WEIGHT: 238 LBS | HEIGHT: 61 IN | BODY MASS INDEX: 44.93 KG/M2

## 2019-08-15 PROCEDURE — 97803 MED NUTRITION INDIV SUBSEQ: CPT

## 2019-08-19 ENCOUNTER — APPOINTMENT (OUTPATIENT)
Dept: PHYSICAL THERAPY | Facility: HOSPITAL | Age: 69
End: 2019-08-19

## 2019-08-30 ENCOUNTER — OFFICE VISIT (OUTPATIENT)
Dept: FAMILY MEDICINE CLINIC | Facility: CLINIC | Age: 69
End: 2019-08-30

## 2019-08-30 VITALS
SYSTOLIC BLOOD PRESSURE: 118 MMHG | HEIGHT: 61 IN | BODY MASS INDEX: 44.56 KG/M2 | DIASTOLIC BLOOD PRESSURE: 66 MMHG | WEIGHT: 236 LBS | OXYGEN SATURATION: 98 % | TEMPERATURE: 98 F | HEART RATE: 77 BPM

## 2019-08-30 DIAGNOSIS — M54.42 CHRONIC BILATERAL LOW BACK PAIN WITH BILATERAL SCIATICA: Primary | ICD-10-CM

## 2019-08-30 DIAGNOSIS — M51.36 DDD (DEGENERATIVE DISC DISEASE), LUMBAR: ICD-10-CM

## 2019-08-30 DIAGNOSIS — M54.41 CHRONIC BILATERAL LOW BACK PAIN WITH BILATERAL SCIATICA: Primary | ICD-10-CM

## 2019-08-30 DIAGNOSIS — M43.16 SPONDYLOLISTHESIS OF LUMBAR REGION: ICD-10-CM

## 2019-08-30 DIAGNOSIS — E66.01 MORBID OBESITY DUE TO EXCESS CALORIES (HCC): ICD-10-CM

## 2019-08-30 DIAGNOSIS — M48.062 LUMBAR STENOSIS WITH NEUROGENIC CLAUDICATION: ICD-10-CM

## 2019-08-30 DIAGNOSIS — G89.29 CHRONIC BILATERAL LOW BACK PAIN WITH BILATERAL SCIATICA: Primary | ICD-10-CM

## 2019-08-30 PROCEDURE — 99214 OFFICE O/P EST MOD 30 MIN: CPT | Performed by: FAMILY MEDICINE

## 2019-08-30 RX ORDER — TRAMADOL HYDROCHLORIDE 50 MG/1
50 TABLET ORAL 2 TIMES DAILY PRN
Qty: 180 TABLET | Refills: 1 | Status: SHIPPED | OUTPATIENT
Start: 2019-08-30 | End: 2019-11-25 | Stop reason: SDUPTHER

## 2019-09-09 ENCOUNTER — DOCUMENTATION (OUTPATIENT)
Dept: PHYSICAL THERAPY | Facility: CLINIC | Age: 69
End: 2019-09-09

## 2019-09-12 ENCOUNTER — TELEPHONE (OUTPATIENT)
Dept: FAMILY MEDICINE CLINIC | Facility: CLINIC | Age: 69
End: 2019-09-12

## 2019-09-12 NOTE — TELEPHONE ENCOUNTER
----- Message from Andreina Luz sent at 9/12/2019 10:16 AM EDT -----  Contact: PATIENT  PATIENT CALLED NEEDING A WRITTEN PRESCRIPTION  FOR AQUATIC THERAPY FOR BACK PAIN TO [facility] IN [place]  PLEASE MAIL PRESCRIPTION TO PATIENT SHE WILL TAKE TO CENTER.  THANK YOU  PATIENT CALL BACK NUMBER [phone]

## 2019-10-04 NOTE — PROGRESS NOTES
"Chief Complaint: \"Pain in my lower back.\"        History of Present Illness: Ms. Casandra Trinidad, 69 y.o. female, originally referred by Dr. Melton in consultation for chronic intractable lower back pain.   Pain history: Patient reports a 10-year history of lower back pain, which began without incident. Pain has progressed in intensity over the past 12 months.   Patient was last seen on 07/24/2019, when she underwent bilateral L3-L4 transforaminal epidural steroid injection from which she has experienced 75% pain relief and functional improvement that lasted for one week, with progressive recurrence.  However, at this time, she is reporting a different pattern of pain more prominent on her left side and radiating all the way down to her left foot.  The pain on her right side is minimal  Pain description: Constant pain with intermittent exacerbation, described as burning and stabbing sensation.   Radiation of pain: The lower back pain radiates into the buttocks LT>RT and into the left leg down to the plantar aspect of the left foot  Pain intensity today: 9/10  Average pain intensity last week: 7/10  Pain intensity ranges from: 4/10 to 9/10  Aggravating factors: Pain increases with standing longer than 1 minute and ambulating more than 1 minute.  Alleviating factors: Pain decreases with sitting and lying.     Associated symptoms:   Patient reports numbness and weakness in the bilateral lower extremities (LT>RT)  Patient denies any new bladder or bowel problems.   Patient reports difficulties with her balance but denies recent falls.      Review of previous therapies and additional medical records:  Casandra Trinidad has already failed the following measures, including:   Conservative measures: oral analgesics and physical therapy   Interventional measures:   07/24/2019: Bilateral L3-L4 transforaminal epidural steroid injections: 75% relief for one week  04/22/2019: Bilateral L3-L4 transforaminal epidural steroid " "injections: more than  80% relief that lasted for 6 weeks.    Surgical measures: No history of lumbar spine or hip surgery  Casandra Trinidad underwent neurosurgical consultation with Dr. Funez on 02/26/2018, and was found not to be a surgical candidate due to her BMI.  Pain Psychology Evaluation by Dr. Pearce (05/02/2019): \"From a psychological perspective, patient is considered to be an appropriate candidate for a spinal cord stimulator at this time.\"  Casandra Trinidad presents with significant comorbidities including CLAUDIA, BPV, morbid obesity, fibromyalgia engaged in treatment.  In terms of current analgesics, Casandra Trinidad takes: Tramadol, Lidocaine patch, meloxicam, CBD. Patient also takes Zoloft   I have reviewed [facility] Report #[id number] consistent to medication reconciliation.     Global Pain Scale 04-18  2019 05-13  2019 07-19  2019 10-08  2019           Pain  17  5  15  17           Feelings  5  3 11    6           Clinical outcomes  17  1 10  18           Activities  22  1 15  11           GPS Total:  61  10 51  52              Review of Diagnostic Studies:    XR SPINE LUMBAR FLEX AND EXT- 04/18/2019: Anterolisthesis of L3 on L4 and L4 on L5 which is stable in both flexion and extension views.  MRI LUMBAR SPINE WO CONTRAST- 01/08/2018.  I have reviewed the images of her MRI.  There are severe degenerative changes.  Severe lumbar canal stenosis at L3-L4, moderate to severe at L4-L5.  There is a leftward disc protrusion at L5-S1 that may contact the left L5 and a left descending S1 nerve roots.  There is increased lumbar lordosis. There is mild anterolisthesis of L3 from L4 and L4 from L5. There is diffuse narrowing of the AP dimension of the neural canal from T12 through L4. Collapse of the disc spaces L2-3, L3-4 and L4-5. There is no marrow edema, marrow tumor, compression fracture or evidence of high-grade malalignment. Transaxial datasets:  T12-L1: soft disc protrusion producing defect on the central " dural sac and lateral recesses which is compounded by facet arthropathy and hypertrophy.  L1-L2: hard and soft disc protrusion compressing the central dural sac and this is compounded by marked arthropathy hypertrophy and bossing from the facets with ligamentum flaval hypertrophy. Therefore there is severe central and lateral recess impingement stenosis at L1-L2.  L2-L3: disc space collapse with a soft disc protrusion, facet hypertrophy and ligamentum flaval disease producing moderately severe central and lateral recess impingement stenosis.  L3-L4: anterolisthesis of L3 from L4, marked facet enlargement hypertrophy bossing and impingement with ligamentum flaval hypertrophy producing critical high-grade central and lateral recess impingement stenosis at the L3-L4 level.  L4-L5: soft disc protrusion with anterolisthesis of L4 from L5 combined with facet disease producing moderately severe central and moderately severe lateral recess stenosis, worse on the right than left.  L5-S1: mild soft disc protrusion effacing the central and leftward dural sac with disc space narrowing.     Review of Systems   Constitutional: Positive for activity change.   Musculoskeletal: Positive for back pain.   Neurological: Positive for dizziness, weakness and numbness.   All other systems reviewed and are negative.        Patient Active Problem List   Diagnosis   • Menopause   • Generalized osteoarthrosis, involving multiple sites   • Esophageal reflux   • CLAUDIA (generalized anxiety disorder)   • Spondylosis without myelopathy or radiculopathy, lumbar region   • Lumbar stenosis with neurogenic claudication   • Spondylolisthesis of lumbar region   • DDD (degenerative disc disease), lumbar   • Morbid obesity due to excess calories (CMS/HCC)   • History of bilateral knee replacement   • History of bariatric surgery   • Physical deconditioning       Past Medical History:   Diagnosis Date   • Age related cataract    • Arthropathy, lower leg    •  Dehiscence of incision    • Ear itch    • Myalgia and myositis    • Pain in joint, ankle and foot    • Pain in joint, hand    • Pain in joint, lower leg    • Tenosynovitis of finger    • Vitamin D deficiency          Past Surgical History:   Procedure Laterality Date   • BREAST LUMPECTOMY Right 2014   •  SECTION      W/ TUBAL LIGATION   • CHOLECYSTECTOMY      LAP   • HYSTERECTOMY      W/ SUPRAPUBIC CYSTOMY/RETROPUBIC URETHROTOMY/POSTERIOR AND ANTERIOR COLORRAPHY   • LAPAROSCOPIC GASTRIC BANDING  2007    W/ HHR ([facility])   • TUBAL ABDOMINAL LIGATION           Family History   Problem Relation Age of Onset   • COPD Mother    • Emphysema Mother    • Diabetes Mother    • Heart failure Mother    • COPD Father    • Pneumonia Father    • Mental illness Father    • COPD Sister          Social History     Socioeconomic History   • Marital status:      Spouse name: Not on file   • Number of children: Not on file   • Years of education: Not on file   • Highest education level: Not on file   Tobacco Use   • Smoking status: Never Smoker   • Smokeless tobacco: Never Used   Substance and Sexual Activity   • Alcohol use: No   • Drug use: No           Current Outpatient Medications:   •  aspirin 81 MG EC tablet, Take 81 mg by mouth daily., Disp: , Rfl:   •  Calcium Carbonate-Vitamin D (CALCIUM 600+D) 600-200 MG-UNIT tablet, Take 1 tablet by mouth 2 (two) times a day., Disp: , Rfl:   •  CBD (cannabidiol) oral oil, Take  by mouth., Disp: , Rfl:   •  Dietary Management Product (RHEUMATE) capsule, Take 1 capsule twice daily, Disp: 180 capsule, Rfl: 1  •  lidocaine (LIDODERM) 5 %, Place 3 patches on the skin as directed by provider Daily. Remove & Discard patch within 12 hours or as directed by MD, Disp: 270 patch, Rfl: 3  •  meclizine (ANTIVERT) 25 MG tablet, Take 1 tablet by mouth 3 (Three) Times a Day As Needed for dizziness., Disp: 90 tablet, Rfl: 3  •  meloxicam (MOBIC) 15 MG tablet, Take 1 tablet  "by mouth Daily., Disp: 90 tablet, Rfl: 3  •  Multiple Vitamins-Minerals (ICAPS AREDS 2 PO), Take 1 capsule by mouth every night., Disp: , Rfl:   •  omeprazole (priLOSEC) 20 MG capsule, Take 1 capsule by mouth Daily., Disp: 90 capsule, Rfl: 3  •  sertraline (ZOLOFT) 25 MG tablet, Take 1 tablet by mouth Daily., Disp: 90 tablet, Rfl: 3  •  tiZANidine (ZANAFLEX) 2 MG tablet, Take 1 tablet by mouth 3 (Three) Times a Day., Disp: 270 tablet, Rfl: 1  •  traMADol (ULTRAM) 50 MG tablet, Take 1 tablet by mouth 2 (Two) Times a Day As Needed for Moderate Pain ., Disp: 180 tablet, Rfl: 1  •  vitamin B-6 (PYRIDOXINE) 100 MG tablet, Take 1 tablet by mouth Daily., Disp: 90 tablet, Rfl: 1      Allergies   Allergen Reactions   • Codeine GI Intolerance   • Diclofenac Nausea And Vomiting   • Lortab [Hydrocodone-Acetaminophen] Nausea And Vomiting   • Tyloxapol Nausea And Vomiting         /78 (BP Location: Right arm, Patient Position: Sitting)   Temp 97.8 °F (36.6 °C) (Temporal)   Ht 154.9 cm (60.98\")   Wt 102 kg (225 lb)   LMP  (LMP Unknown)   BMI 42.54 kg/m²       Physical Exam:  Constitutional: Patient is oriented to person, place, and time.   Patient appears well-developed and well-nourished.   HEENT: Head: Normocephalic and atraumatic.   Eyes: Conjunctivae and lids are normal.   Pupils: Equal, round, reactive to light.   Neck: Trachea normal. Neck supple. No JVD present.   Pulmonary Respiratory effort: No increased work of breathing or signs of respiratory distress. Auscultation of lungs: Clear to auscultation.   Cardiovascular Auscultation of heart: Normal rate and rhythm, normal S1 and S2, no murmurs.   Musculoskeletal   Gait and station: Gait evaluation demonstrated shuffling   Lumbar spine: Passive and active range of motion are limited secondary to pain. Extension and rotation of the lumbar spine increased and reproduced pain. Lumbar facet joint loading maneuvers are positive.  Sachin and Gaenslen's tests are " negative   Piriformis maneuvers are negative   Palpation of the bilateral ischial tuberosities, unrevealing   Palpation of the bilateral greater trochanter, unrevealing   Examination of the Iliotibial band: unrevealing   Hip joints: The range of motion of the hip joints is full and without pain   Neurological:   Patient is alert and oriented to person, place, and time.   Speech: speech is normal.   Cortical function: Normal mental status.   Cranial nerves: Cranial nerves 2-12 intact.   Reflex Scores:  Right patellar: 2+  Left patellar: 2+  Right Achilles: 2+  Left Achilles: 2+  Motor strength: 5/5  Motor Tone: normal tone.   Involuntary movements: none.   Superficial/Primitive Reflexes: primitive reflexes were absent.   Right Mcdonnell: absent  Left Mcdonnell: absent  Right ankle clonus: absent  Left ankle clonus: absent   Negative long tract signs. Straight leg raising test is negative. Femoral stretch sign is negative.   Sensation: No sensory loss. Sensory exam: intact to light touch, intact pain and temperature sensation, intact vibration sensation and normal proprioception.   Coordination: Normal finger to nose and heel to shin. Normal balance and negative Romberg's sign   Skin and subcutaneous tissue: Skin is warm and intact. No rash noted. No cyanosis.   Psychiatric: Judgment and insight: Normal. Orientation to person, place and time: Normal. Recent and remote memory: Intact. Mood and affect: Normal.     ASSESSMENT:   1. Lumbar stenosis with neurogenic claudication    2. Spondylolisthesis of lumbar region    3. DDD (degenerative disc disease), lumbar    4. Spondylosis without myelopathy or radiculopathy, lumbar region    5. History of bilateral knee replacement    6. Generalized osteoarthrosis, involving multiple sites    7. Morbid obesity due to excess calories (CMS/HCC)    8. History of bariatric surgery    9. CLAUDIA (generalized anxiety disorder)    10. Physical deconditioning        PLAN/MEDICAL DECISION MAKING:  I had a lengthy conversation with Ms. Casandra Trinidad regarding her chronic pain condition and potential therapeutic options including risks, benefits, alternative therapies, to name a few. Patient has previously failed to obtain pain relief with conservative measures, as referenced above. MRI of the lumbar spine on 01/08/2018 revealed grade 1 spondylolisthesis at L4 on L5, and L3 on L4. There is central spinal stenosis at multiple levels, most severe at L3-L4, L4-L5, L2-L3. There is lateral recess stenosis on the right at L4-L5. There is disc degeneration throughout the lumbar region. Lumbar facet arthropathy. Patient has been found to be a potential surgical candidate for lumbar surgery provided that her BMI gets within a more normal range. Patient has a history of lap band procedure. I have reviewed all available patient's medical records as well as previous therapies as referenced above.  Therefore, I have proposed the following plan:  1. Interventional pain management measures: Patient will be scheduled for diagnostic and therapeutic left L4-L5 and left L5-S1 transforaminal epidural steroid injections.  If patient continues to struggle with pain, then, she has decided to move forward with a spinal cord stimulator trial.  She is not a candidate for surgery at this time secondary to BMI.  In addition, she has declined the possibility of surgery.  She is working on weight loss. She is status post lap band.   2. Pharmacological measures: Reviewed. Discussed.   A. Patient takes Tramadol, Lidocaine patch, meloxicam, CBD. Patient also takes Zoloft   B. Continue Rheumate one tablet twice daily  C. Start pyridoxine 100 mg one tablet by mouth daily  3. Long-term rehabilitation efforts:  A. The patient does not have a history of falls. I did complete a risk assessment for falls.   B. Patient will start a comprehensive physical therapy program for water therapy, therapeutic exercise, core strengthening, gait and balance  training and neurodynamics   C. Start an exercise program such as water therapy  D. Contrast therapy: Apply ice-packs for 15-20 minutes, followed by heating pads for 15-20 minutes to affected area   E. Referral to [facility]  F. Follow-up with bariatric surgeon to assess if lap band still working and could be adjusted  4. The patient has been instructed to contact my office with any questions or difficulties. The patient understands the plan and agrees to proceed accordingly.         Patient Care Team:  Tony Melton MD as PCP - General (Family Medicine)  Tony Melton MD as PCP - Claims Attributed  Endy, Sachin Naidu MD PhD as Consulting Physician (Orthopedic Surgery)  Gagandeep Dc MD as Consulting Physician (Pain Medicine)     No orders of the defined types were placed in this encounter.        Future Appointments   Date Time Provider Department Center   10/28/2019 12:45 PM Aga Batista RD [facility]   11/25/2019 10:30 AM Tony Melton MD [facility] None         Gagandeep Dc MD     EMR Dragon/Transcription disclaimer:  Much of this encounter note is an electronic transcription of spoken language to printed text. Electronic transcription of spoken language may permit erroneous, or at times, nonsensical words or phrases to be inadvertently transcribed. Although I have reviewed the note for such errors, some may still exist.

## 2019-10-08 ENCOUNTER — OFFICE VISIT (OUTPATIENT)
Dept: PAIN MEDICINE | Facility: CLINIC | Age: 69
End: 2019-10-08

## 2019-10-08 VITALS
DIASTOLIC BLOOD PRESSURE: 78 MMHG | SYSTOLIC BLOOD PRESSURE: 138 MMHG | BODY MASS INDEX: 42.48 KG/M2 | HEIGHT: 61 IN | WEIGHT: 225 LBS | TEMPERATURE: 97.8 F

## 2019-10-08 DIAGNOSIS — E66.01 MORBID OBESITY DUE TO EXCESS CALORIES (HCC): ICD-10-CM

## 2019-10-08 DIAGNOSIS — F41.1 GAD (GENERALIZED ANXIETY DISORDER): ICD-10-CM

## 2019-10-08 DIAGNOSIS — M15.9 GENERALIZED OSTEOARTHROSIS, INVOLVING MULTIPLE SITES: ICD-10-CM

## 2019-10-08 DIAGNOSIS — M47.816 SPONDYLOSIS OF LUMBAR REGION WITHOUT MYELOPATHY OR RADICULOPATHY: ICD-10-CM

## 2019-10-08 DIAGNOSIS — Z98.84 HISTORY OF BARIATRIC SURGERY: ICD-10-CM

## 2019-10-08 DIAGNOSIS — M51.36 DDD (DEGENERATIVE DISC DISEASE), LUMBAR: ICD-10-CM

## 2019-10-08 DIAGNOSIS — M47.816 SPONDYLOSIS WITHOUT MYELOPATHY OR RADICULOPATHY, LUMBAR REGION: ICD-10-CM

## 2019-10-08 DIAGNOSIS — M43.16 SPONDYLOLISTHESIS OF LUMBAR REGION: ICD-10-CM

## 2019-10-08 DIAGNOSIS — Z96.653 HISTORY OF BILATERAL KNEE REPLACEMENT: ICD-10-CM

## 2019-10-08 DIAGNOSIS — M48.062 LUMBAR STENOSIS WITH NEUROGENIC CLAUDICATION: Primary | ICD-10-CM

## 2019-10-08 DIAGNOSIS — R53.81 PHYSICAL DECONDITIONING: ICD-10-CM

## 2019-10-08 DIAGNOSIS — M48.062 LUMBAR STENOSIS WITH NEUROGENIC CLAUDICATION: ICD-10-CM

## 2019-10-08 PROCEDURE — 99214 OFFICE O/P EST MOD 30 MIN: CPT | Performed by: ANESTHESIOLOGY

## 2019-10-08 RX ORDER — MULTIVITAMIN WITH IRON
100 TABLET ORAL DAILY
Qty: 90 TABLET | Refills: 5 | Status: SHIPPED | OUTPATIENT
Start: 2019-10-08 | End: 2019-12-02 | Stop reason: SDUPTHER

## 2019-10-14 ENCOUNTER — HOSPITAL ENCOUNTER (OUTPATIENT)
Dept: NUTRITION | Facility: HOSPITAL | Age: 69
Setting detail: RECURRING SERIES
Discharge: HOME | End: 2019-10-14

## 2019-10-14 VITALS — BODY MASS INDEX: 42.1 KG/M2 | HEIGHT: 61 IN | WEIGHT: 223 LBS

## 2019-10-14 PROCEDURE — 97803 MED NUTRITION INDIV SUBSEQ: CPT

## 2019-10-21 ENCOUNTER — OUTSIDE FACILITY SERVICE (OUTPATIENT)
Dept: PAIN MEDICINE | Facility: CLINIC | Age: 69
End: 2019-10-21

## 2019-10-21 PROCEDURE — 99152 MOD SED SAME PHYS/QHP 5/>YRS: CPT | Performed by: ANESTHESIOLOGY

## 2019-10-21 PROCEDURE — 64484 NJX AA&/STRD TFRM EPI L/S EA: CPT | Performed by: ANESTHESIOLOGY

## 2019-10-21 PROCEDURE — 64483 NJX AA&/STRD TFRM EPI L/S 1: CPT | Performed by: ANESTHESIOLOGY

## 2019-10-22 ENCOUNTER — TELEPHONE (OUTPATIENT)
Dept: PAIN MEDICINE | Facility: CLINIC | Age: 69
End: 2019-10-22

## 2019-10-22 NOTE — TELEPHONE ENCOUNTER
Patient had dx/tx left L4-L5 and left L5-S1 TFESI on 10/21/19. Spoke with patient. She reports that she is doing well

## 2019-11-11 ENCOUNTER — TELEPHONE (OUTPATIENT)
Dept: FAMILY MEDICINE CLINIC | Facility: CLINIC | Age: 69
End: 2019-11-11

## 2019-11-11 RX ORDER — SERTRALINE HYDROCHLORIDE 25 MG/1
25 TABLET, FILM COATED ORAL DAILY
Qty: 90 TABLET | Refills: 3 | Status: SHIPPED | OUTPATIENT
Start: 2019-11-11 | End: 2021-01-04

## 2019-11-19 ENCOUNTER — TELEPHONE (OUTPATIENT)
Dept: PAIN MEDICINE | Facility: CLINIC | Age: 69
End: 2019-11-19

## 2019-11-19 NOTE — TELEPHONE ENCOUNTER
Patient had left a voicemail on the scheduling line. I returned her call but was unable to leave a voicemail.

## 2019-11-25 ENCOUNTER — OFFICE VISIT (OUTPATIENT)
Dept: FAMILY MEDICINE CLINIC | Facility: CLINIC | Age: 69
End: 2019-11-25

## 2019-11-25 ENCOUNTER — LAB (OUTPATIENT)
Dept: LAB | Facility: HOSPITAL | Age: 69
End: 2019-11-25

## 2019-11-25 VITALS
TEMPERATURE: 98 F | DIASTOLIC BLOOD PRESSURE: 80 MMHG | OXYGEN SATURATION: 97 % | BODY MASS INDEX: 39.65 KG/M2 | HEIGHT: 61 IN | WEIGHT: 210 LBS | SYSTOLIC BLOOD PRESSURE: 148 MMHG | HEART RATE: 78 BPM

## 2019-11-25 DIAGNOSIS — M51.36 DDD (DEGENERATIVE DISC DISEASE), LUMBAR: ICD-10-CM

## 2019-11-25 DIAGNOSIS — E78.00 ELEVATED CHOLESTEROL: ICD-10-CM

## 2019-11-25 DIAGNOSIS — Z98.84 HISTORY OF BARIATRIC SURGERY: ICD-10-CM

## 2019-11-25 DIAGNOSIS — Z11.59 NEED FOR HEPATITIS C SCREENING TEST: ICD-10-CM

## 2019-11-25 DIAGNOSIS — Z51.81 MEDICATION MONITORING ENCOUNTER: ICD-10-CM

## 2019-11-25 DIAGNOSIS — G89.29 CHRONIC BILATERAL LOW BACK PAIN WITH BILATERAL SCIATICA: ICD-10-CM

## 2019-11-25 DIAGNOSIS — Z23 IMMUNIZATION DUE: ICD-10-CM

## 2019-11-25 DIAGNOSIS — E66.01 MORBID OBESITY DUE TO EXCESS CALORIES (HCC): ICD-10-CM

## 2019-11-25 DIAGNOSIS — M15.9 GENERALIZED OSTEOARTHROSIS, INVOLVING MULTIPLE SITES: ICD-10-CM

## 2019-11-25 DIAGNOSIS — M43.16 SPONDYLOLISTHESIS OF LUMBAR REGION: ICD-10-CM

## 2019-11-25 DIAGNOSIS — Z00.00 INITIAL MEDICARE ANNUAL WELLNESS VISIT: Primary | ICD-10-CM

## 2019-11-25 DIAGNOSIS — M54.42 CHRONIC BILATERAL LOW BACK PAIN WITH BILATERAL SCIATICA: ICD-10-CM

## 2019-11-25 DIAGNOSIS — M48.062 LUMBAR STENOSIS WITH NEUROGENIC CLAUDICATION: ICD-10-CM

## 2019-11-25 DIAGNOSIS — F41.1 GAD (GENERALIZED ANXIETY DISORDER): ICD-10-CM

## 2019-11-25 DIAGNOSIS — M54.41 CHRONIC BILATERAL LOW BACK PAIN WITH BILATERAL SCIATICA: ICD-10-CM

## 2019-11-25 LAB
ALBUMIN SERPL-MCNC: 4.2 G/DL (ref 3.5–5.2)
ALBUMIN/GLOB SERPL: 1.2 G/DL
ALP SERPL-CCNC: 105 U/L (ref 39–117)
ALT SERPL W P-5'-P-CCNC: 21 U/L (ref 1–33)
ANION GAP SERPL CALCULATED.3IONS-SCNC: 12.5 MMOL/L (ref 5–15)
AST SERPL-CCNC: 27 U/L (ref 1–32)
BILIRUB SERPL-MCNC: 0.3 MG/DL (ref 0.2–1.2)
BUN BLD-MCNC: 11 MG/DL (ref 8–23)
BUN/CREAT SERPL: 15.5 (ref 7–25)
CALCIUM SPEC-SCNC: 9.9 MG/DL (ref 8.6–10.5)
CHLORIDE SERPL-SCNC: 101 MMOL/L (ref 98–107)
CHOLEST SERPL-MCNC: 171 MG/DL (ref 0–200)
CO2 SERPL-SCNC: 28.5 MMOL/L (ref 22–29)
CREAT BLD-MCNC: 0.71 MG/DL (ref 0.57–1)
DEPRECATED RDW RBC AUTO: 41.3 FL (ref 37–54)
ERYTHROCYTE [DISTWIDTH] IN BLOOD BY AUTOMATED COUNT: 13.2 % (ref 12.3–15.4)
GFR SERPL CREATININE-BSD FRML MDRD: 82 ML/MIN/1.73
GLOBULIN UR ELPH-MCNC: 3.4 GM/DL
GLUCOSE BLD-MCNC: 91 MG/DL (ref 65–99)
HCT VFR BLD AUTO: 41.2 % (ref 34–46.6)
HCV AB SER DONR QL: NORMAL
HDLC SERPL-MCNC: 53 MG/DL (ref 40–60)
HGB BLD-MCNC: 13.8 G/DL (ref 12–15.9)
LDLC SERPL CALC-MCNC: 94 MG/DL (ref 0–100)
LDLC/HDLC SERPL: 1.77 {RATIO}
MCH RBC QN AUTO: 28.8 PG (ref 26.6–33)
MCHC RBC AUTO-ENTMCNC: 33.5 G/DL (ref 31.5–35.7)
MCV RBC AUTO: 86 FL (ref 79–97)
PLATELET # BLD AUTO: 264 10*3/MM3 (ref 140–450)
PMV BLD AUTO: 12 FL (ref 6–12)
POTASSIUM BLD-SCNC: 4.7 MMOL/L (ref 3.5–5.2)
PROT SERPL-MCNC: 7.6 G/DL (ref 6–8.5)
RBC # BLD AUTO: 4.79 10*6/MM3 (ref 3.77–5.28)
SODIUM BLD-SCNC: 142 MMOL/L (ref 136–145)
TRIGL SERPL-MCNC: 121 MG/DL (ref 0–150)
VLDLC SERPL-MCNC: 24.2 MG/DL (ref 5–40)
WBC NRBC COR # BLD: 7.35 10*3/MM3 (ref 3.4–10.8)

## 2019-11-25 PROCEDURE — 80061 LIPID PANEL: CPT

## 2019-11-25 PROCEDURE — 85027 COMPLETE CBC AUTOMATED: CPT

## 2019-11-25 PROCEDURE — G0439 PPPS, SUBSEQ VISIT: HCPCS | Performed by: FAMILY MEDICINE

## 2019-11-25 PROCEDURE — 36415 COLL VENOUS BLD VENIPUNCTURE: CPT

## 2019-11-25 PROCEDURE — 86803 HEPATITIS C AB TEST: CPT

## 2019-11-25 PROCEDURE — 80053 COMPREHEN METABOLIC PANEL: CPT

## 2019-11-25 RX ORDER — GABAPENTIN 300 MG/1
300 CAPSULE ORAL 3 TIMES DAILY PRN
Qty: 270 CAPSULE | Refills: 1 | Status: SHIPPED | OUTPATIENT
Start: 2019-11-25 | End: 2020-03-25

## 2019-11-25 RX ORDER — TRAMADOL HYDROCHLORIDE 50 MG/1
50 TABLET ORAL EVERY 6 HOURS PRN
Qty: 360 TABLET | Refills: 1 | Status: SHIPPED | OUTPATIENT
Start: 2019-11-25 | End: 2020-06-01 | Stop reason: SDUPTHER

## 2019-11-25 NOTE — PROGRESS NOTES
The ABCs of the Annual Wellness Visit  Initial Medicare Wellness Visit    Chief Complaint   Patient presents with   • Annual Exam     Medicare wellness   Pt. is not fasting today   • Back Pain   • Osteoarthritis       Subjective   History of Present Illness:  Casandra Trinidad is a 69 y.o. female who presents for an Initial Medicare Wellness Visit.    HEALTH RISK ASSESSMENT    Recent Hospitalizations:  No hospitalization(s) within the last year.    Current Medical Providers:  Patient Care Team:  Tony Melton MD as PCP - General (Family Medicine)  Tony Melton MD as PCP - Claims Attributed  Endy, Sachin Naidu MD PhD as Consulting Physician (Orthopedic Surgery)  Gagandeep Dc MD as Consulting Physician (Pain Medicine)    Smoking Status:  Social History     Tobacco Use   Smoking Status Never Smoker   Smokeless Tobacco Never Used       Alcohol Consumption:  Social History     Substance and Sexual Activity   Alcohol Use No       Depression Screen:   PHQ-2/PHQ-9 Depression Screening 11/25/2019   Little interest or pleasure in doing things 0   Feeling down, depressed, or hopeless 0   Total Score 0       Fall Risk Screen:  KEVINADI Fall Risk Assessment was completed, and patient is at HIGH risk for falls. Assessment completed on:11/25/2019    Health Habits and Functional and Cognitive Screening:  Functional & Cognitive Status 11/25/2019   Do you have difficulty preparing food and eating? No   Do you have difficulty bathing yourself, getting dressed or grooming yourself? No   Do you have difficulty using the toilet? No   Do you have difficulty moving around from place to place? Yes   Do you have trouble with steps or getting out of a bed or a chair? Yes   Current Diet Well Balanced Diet   Dental Exam Up to date   Eye Exam Up to date   Exercise (times per week) 3 times per week   Current Exercise Activities Include Swimming   Do you need help using the phone?  No   Are you deaf or do you  have serious difficulty hearing?  No   Do you need help with transportation? Yes   Do you need help shopping? Yes   Do you need help preparing meals?  No   Do you need help with housework?  No   Do you need help with laundry? No   Do you need help taking your medications? No   Do you need help managing money? No   Do you ever drive or ride in a car without wearing a seat belt? Yes   Have you felt unusual stress, anger or loneliness in the last month? -   Who do you live with? -   If you need help, do you have trouble finding someone available to you? -   Have you been bothered in the last four weeks by sexual problems? -         Does the patient have evidence of cognitive impairment? No    Asprin use counseling:Taking ASA appropriately as indicated    Age-appropriate Screening Schedule:  Refer to the list below for future screening recommendations based on patient's age, sex and/or medical conditions. Orders for these recommended tests are listed in the plan section. The patient has been provided with a written plan.    Health Maintenance   Topic Date Due   • TDAP/TD VACCINES (1 - Tdap) 03/31/1969   • ZOSTER VACCINE (2 of 2) 10/27/2016   • PNEUMOCOCCAL VACCINES (65+ LOW/MEDIUM RISK) (2 of 2 - PCV13) 10/27/2018   • LIPID PANEL  11/25/2019   • COLONOSCOPY  11/25/2020 (Originally 6/21/2016)   • MAMMOGRAM  01/28/2021   • INFLUENZA VACCINE  Completed          The following portions of the patient's history were reviewed and updated as appropriate: allergies, current medications, past family history, past medical history, past social history, past surgical history and problem list.    Outpatient Medications Prior to Visit   Medication Sig Dispense Refill   • aspirin 81 MG EC tablet Take 81 mg by mouth daily.     • Calcium Carbonate-Vitamin D (CALCIUM 600+D) 600-200 MG-UNIT tablet Take 1 tablet by mouth 2 (two) times a day.     • CBD (cannabidiol) oral oil Take  by mouth.     • Dietary Management Product (RHEUMATE) capsule  Take 1 capsule twice daily 180 capsule 1   • lidocaine (LIDODERM) 5 % Place 3 patches on the skin as directed by provider Daily. Remove & Discard patch within 12 hours or as directed by  patch 3   • meclizine (ANTIVERT) 25 MG tablet Take 1 tablet by mouth 3 (Three) Times a Day As Needed for dizziness. 90 tablet 3   • meloxicam (MOBIC) 15 MG tablet Take 1 tablet by mouth Daily. 90 tablet 3   • Multiple Vitamins-Minerals (ICAPS AREDS 2 PO) Take 1 capsule by mouth every night.     • omeprazole (priLOSEC) 20 MG capsule Take 1 capsule by mouth Daily. 90 capsule 3   • sertraline (ZOLOFT) 25 MG tablet Take 1 tablet by mouth Daily. 90 tablet 3   • tiZANidine (ZANAFLEX) 2 MG tablet Take 1 tablet by mouth 3 (Three) Times a Day. 270 tablet 1   • vitamin B-6 (PYRIDOXINE) 100 MG tablet Take 1 tablet by mouth Daily. 90 tablet 5   • traMADol (ULTRAM) 50 MG tablet Take 1 tablet by mouth 2 (Two) Times a Day As Needed for Moderate Pain . 180 tablet 1     No facility-administered medications prior to visit.        Patient Active Problem List   Diagnosis   • Menopause   • Generalized osteoarthrosis, involving multiple sites   • Esophageal reflux   • CLAUDIA (generalized anxiety disorder)   • Spondylosis without myelopathy or radiculopathy, lumbar region   • Lumbar stenosis with neurogenic claudication   • Spondylolisthesis of lumbar region   • DDD (degenerative disc disease), lumbar   • Morbid obesity due to excess calories (CMS/HCC)   • History of bilateral knee replacement   • History of bariatric surgery   • Physical deconditioning       Advanced Care Planning:  Patient does not have an advance directive - information provided to the patient today    Review of Systems     Back pain with radiculopathy symptoms worse over the past year.  This is despite management through pain management consultation.  Patient is requesting increased pain medication and has questions regarding medication for neuropathic pain that she is experiencing  "in both lower extremities.    Compared to one year ago, the patient feels her physical health is worse.  Compared to one year ago, the patient feels her mental health is the same.    Reviewed chart for potential of high risk medication in the elderly: yes  Reviewed chart for potential of harmful drug interactions in the elderly:yes    Objective         Vitals:    11/25/19 1024   BP: 148/80   Pulse: 78   Temp: 98 °F (36.7 °C)   SpO2: 97%   Weight: 95.3 kg (210 lb)   Height: 154.9 cm (60.98\")       Body mass index is 39.7 kg/m².  Discussed the patient's BMI with her. The BMI is above average; BMI management plan is completed.    Physical Exam          Assessment/Plan   Medicare Risks and Personalized Health Plan  CMS Preventative Services Quick Reference  Cardiovascular risk  Chronic Pain   Immunizations Discussed/Encouraged (specific immunizations; Influenza, Pneumococcal 23 and Shingrix )  Obesity/Overweight     The above risks/problems have been discussed with the patient.  Pertinent information has been shared with the patient in the After Visit Summary.  Follow up plans and orders are seen below in the Assessment/Plan Section.    Diagnoses and all orders for this visit:    1. Initial Medicare annual wellness visit (Primary)    2. Chronic bilateral low back pain with bilateral sciatica  -     traMADol (ULTRAM) 50 MG tablet; Take 1 tablet by mouth Every 6 (Six) Hours As Needed for Moderate Pain .  Dispense: 360 tablet; Refill: 1  -     gabapentin (NEURONTIN) 300 MG capsule; Take 1 capsule by mouth 3 (Three) Times a Day As Needed (neuropathic pain).  Dispense: 270 capsule; Refill: 1    3. Lumbar stenosis with neurogenic claudication  -     traMADol (ULTRAM) 50 MG tablet; Take 1 tablet by mouth Every 6 (Six) Hours As Needed for Moderate Pain .  Dispense: 360 tablet; Refill: 1  -     gabapentin (NEURONTIN) 300 MG capsule; Take 1 capsule by mouth 3 (Three) Times a Day As Needed (neuropathic pain).  Dispense: 270 " capsule; Refill: 1    4. Spondylolisthesis of lumbar region  -     traMADol (ULTRAM) 50 MG tablet; Take 1 tablet by mouth Every 6 (Six) Hours As Needed for Moderate Pain .  Dispense: 360 tablet; Refill: 1    5. DDD (degenerative disc disease), lumbar  -     traMADol (ULTRAM) 50 MG tablet; Take 1 tablet by mouth Every 6 (Six) Hours As Needed for Moderate Pain .  Dispense: 360 tablet; Refill: 1    6. Generalized osteoarthrosis, involving multiple sites  -     traMADol (ULTRAM) 50 MG tablet; Take 1 tablet by mouth Every 6 (Six) Hours As Needed for Moderate Pain .  Dispense: 360 tablet; Refill: 1    7. History of bariatric surgery  -     CBC (No Diff); Future  -     Comprehensive Metabolic Panel; Future  -     Lipid Panel; Future    8. CLAUDIA (generalized anxiety disorder)    9. Morbid obesity due to excess calories (CMS/HCC)  -     CBC (No Diff); Future  -     Comprehensive Metabolic Panel; Future  -     Lipid Panel; Future    10. Medication monitoring encounter  -     CBC (No Diff); Future  -     Comprehensive Metabolic Panel; Future    11. Need for hepatitis C screening test  -     Hepatitis C Antibody; Future    12. Elevated cholesterol  -     Comprehensive Metabolic Panel; Future  -     Lipid Panel; Future      Follow Up:  Return in about 6 months (around 5/25/2020) for Recheck.     An After Visit Summary and PPPS were given to the patient.

## 2019-12-02 ENCOUNTER — APPOINTMENT (OUTPATIENT)
Dept: LAB | Facility: HOSPITAL | Age: 69
End: 2019-12-02

## 2019-12-02 ENCOUNTER — OFFICE VISIT (OUTPATIENT)
Dept: PAIN MEDICINE | Facility: CLINIC | Age: 69
End: 2019-12-02

## 2019-12-02 VITALS
SYSTOLIC BLOOD PRESSURE: 124 MMHG | RESPIRATION RATE: 12 BRPM | DIASTOLIC BLOOD PRESSURE: 82 MMHG | HEART RATE: 66 BPM | TEMPERATURE: 97.1 F | BODY MASS INDEX: 40.08 KG/M2 | OXYGEN SATURATION: 97 % | WEIGHT: 212 LBS

## 2019-12-02 DIAGNOSIS — M43.16 SPONDYLOLISTHESIS OF LUMBAR REGION: ICD-10-CM

## 2019-12-02 DIAGNOSIS — M47.816 SPONDYLOSIS OF LUMBAR REGION WITHOUT MYELOPATHY OR RADICULOPATHY: ICD-10-CM

## 2019-12-02 DIAGNOSIS — M47.816 SPONDYLOSIS WITHOUT MYELOPATHY OR RADICULOPATHY, LUMBAR REGION: ICD-10-CM

## 2019-12-02 DIAGNOSIS — Z01.818 PREOP TESTING: ICD-10-CM

## 2019-12-02 DIAGNOSIS — M51.36 DDD (DEGENERATIVE DISC DISEASE), LUMBAR: ICD-10-CM

## 2019-12-02 DIAGNOSIS — M48.062 LUMBAR STENOSIS WITH NEUROGENIC CLAUDICATION: Primary | ICD-10-CM

## 2019-12-02 DIAGNOSIS — Z51.89 ENCOUNTER FOR OTHER SPECIFIED AFTERCARE: ICD-10-CM

## 2019-12-02 DIAGNOSIS — M48.062 LUMBAR STENOSIS WITH NEUROGENIC CLAUDICATION: ICD-10-CM

## 2019-12-02 DIAGNOSIS — E66.01 MORBID OBESITY DUE TO EXCESS CALORIES (HCC): ICD-10-CM

## 2019-12-02 DIAGNOSIS — Z51.81 ENCOUNTER FOR THERAPEUTIC DRUG LEVEL MONITORING: ICD-10-CM

## 2019-12-02 DIAGNOSIS — R53.81 PHYSICAL DECONDITIONING: ICD-10-CM

## 2019-12-02 DIAGNOSIS — F41.1 GAD (GENERALIZED ANXIETY DISORDER): ICD-10-CM

## 2019-12-02 LAB — APTT PPP: 32.4 SECONDS (ref 24–37)

## 2019-12-02 PROCEDURE — 99214 OFFICE O/P EST MOD 30 MIN: CPT | Performed by: NURSE PRACTITIONER

## 2019-12-02 PROCEDURE — 36415 COLL VENOUS BLD VENIPUNCTURE: CPT | Performed by: NURSE PRACTITIONER

## 2019-12-02 PROCEDURE — 85730 THROMBOPLASTIN TIME PARTIAL: CPT | Performed by: NURSE PRACTITIONER

## 2019-12-02 PROCEDURE — 85025 COMPLETE CBC W/AUTO DIFF WBC: CPT | Performed by: NURSE PRACTITIONER

## 2019-12-02 RX ORDER — MULTIVITAMIN WITH IRON
100 TABLET ORAL DAILY
Qty: 90 TABLET | Refills: 5 | Status: SHIPPED | OUTPATIENT
Start: 2019-12-02 | End: 2020-01-12

## 2019-12-03 LAB
BASOPHILS # BLD AUTO: 0.07 10*3/MM3 (ref 0–0.2)
BASOPHILS NFR BLD AUTO: 1 % (ref 0–1.5)
DEPRECATED RDW RBC AUTO: 42.4 FL (ref 37–54)
EOSINOPHIL # BLD AUTO: 0.19 10*3/MM3 (ref 0–0.4)
EOSINOPHIL NFR BLD AUTO: 2.6 % (ref 0.3–6.2)
ERYTHROCYTE [DISTWIDTH] IN BLOOD BY AUTOMATED COUNT: 13.3 % (ref 12.3–15.4)
HCT VFR BLD AUTO: 40.6 % (ref 34–46.6)
HGB BLD-MCNC: 12.9 G/DL (ref 12–15.9)
IMM GRANULOCYTES # BLD AUTO: 0.01 10*3/MM3 (ref 0–0.05)
IMM GRANULOCYTES NFR BLD AUTO: 0.1 % (ref 0–0.5)
LYMPHOCYTES # BLD AUTO: 2.69 10*3/MM3 (ref 0.7–3.1)
LYMPHOCYTES NFR BLD AUTO: 36.7 % (ref 19.6–45.3)
MCH RBC QN AUTO: 27.8 PG (ref 26.6–33)
MCHC RBC AUTO-ENTMCNC: 31.8 G/DL (ref 31.5–35.7)
MCV RBC AUTO: 87.5 FL (ref 79–97)
MONOCYTES # BLD AUTO: 0.67 10*3/MM3 (ref 0.1–0.9)
MONOCYTES NFR BLD AUTO: 9.1 % (ref 5–12)
NEUTROPHILS # BLD AUTO: 3.7 10*3/MM3 (ref 1.7–7)
NEUTROPHILS NFR BLD AUTO: 50.5 % (ref 42.7–76)
NRBC BLD AUTO-RTO: 0 /100 WBC (ref 0–0.2)
PLATELET # BLD AUTO: 317 10*3/MM3 (ref 140–450)
PMV BLD AUTO: 12 FL (ref 6–12)
RBC # BLD AUTO: 4.64 10*6/MM3 (ref 3.77–5.28)
WBC NRBC COR # BLD: 7.33 10*3/MM3 (ref 3.4–10.8)

## 2019-12-04 PROBLEM — N95.0 PMB (POSTMENOPAUSAL BLEEDING): Status: ACTIVE | Noted: 2019-12-04

## 2019-12-05 ENCOUNTER — OFFICE VISIT (OUTPATIENT)
Dept: OBSTETRICS AND GYNECOLOGY | Facility: CLINIC | Age: 69
End: 2019-12-05

## 2019-12-05 VITALS
WEIGHT: 212.6 LBS | SYSTOLIC BLOOD PRESSURE: 116 MMHG | BODY MASS INDEX: 40.14 KG/M2 | HEIGHT: 61 IN | DIASTOLIC BLOOD PRESSURE: 62 MMHG

## 2019-12-05 DIAGNOSIS — N89.8 VAGINAL DISCHARGE: ICD-10-CM

## 2019-12-05 DIAGNOSIS — R14.0 ABDOMINAL BLOATING: ICD-10-CM

## 2019-12-05 DIAGNOSIS — N95.0 PMB (POSTMENOPAUSAL BLEEDING): Primary | ICD-10-CM

## 2019-12-05 PROCEDURE — 99203 OFFICE O/P NEW LOW 30 MIN: CPT | Performed by: OBSTETRICS & GYNECOLOGY

## 2019-12-05 NOTE — PROGRESS NOTES
"Subjective   Chief Complaint   Patient presents with   • Gynecologic Exam     PMB bleeding     Casandra Trinidad is a 69 y.o. year old .  No LMP recorded (lmp unknown). Patient has had a hysterectomy.  She presents to be seen because of episode of postmenopausal bleeding.  Patient reports about 2 months ago she had some fullness down in the lower right vagina area she reports that this got better.  However about 2 weeks ago she noted some discharge with watery pinkness yellow in it.  She reports this occurs for 3-4 days in a row then stops and then restarts again. Denies any issues with stool incontinence. Reports urinary urgency and incontinence but does have neurogenic issues. She has a history of TVH and A/P repair in . She is getting a lower nerve stimulator this month. She denies any feculent material or urine from the vagin.   OTHER THINGS SHE WANTS TO DISCUSS TODAY:  Nothing else    The following portions of the patient's history were reviewed and updated as appropriate:current medications, allergies, past family history, past medical history, past social history and past surgical history    Social History    Tobacco Use      Smoking status: Never Smoker      Smokeless tobacco: Never Used    Review of Systems  Constitutional POS: nothing reported    NEG: anorexia or night sweats   Genitourinary POS: urge incontinene is present but it IS NOT effecting her ADL's and urgency    NEG: dysuria or hematuria   Gastointestinal POS: nothing reported    NEG: bloating, change in bowel habits, melena or reflux symptoms   Integument POS: nothing reported    NEG: moles that are changing in size, shape, color or rashes   Breast POS: nothing reported    NEG: persistent breast lump, skin dimpling or nipple discharge         Objective   /62 (Patient Position: Sitting, Cuff Size: Large Adult)   Ht 154.9 cm (60.98\")   Wt 96.4 kg (212 lb 9.6 oz)   LMP  (LMP Unknown)   Breastfeeding? No   BMI 40.20 kg/m² "     General:  well developed; well nourished  no acute distress                           Pelvis: Clinical staff was present for exam  External genitalia:  normal appearance of the external genitalia including Bartholin's and [place]'s glands.  :  urethral meatus normal;  Vaginal:  atrophic mucosal changes are present; minimal discharge at most present, bleeding noted on left sidewall from speculum and atrophy   Cervix:  absent.  Uterus:  absent.  Adnexa:  non palpable bilaterally.  Rectal:  anus visually normal appearing. recto-vaginal exam unremarkable and confirms findings;   No obvious area of breakdown. Mild tenderness along cuff with bimanual exam. Exam limited by body habitus      Lab Review   No data reviewed    Imaging   No data reviewed        Assessment   1. 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 Urinary urgency and incontinence      Plan   1. Recommend proceeding with pelvic ultrasound given patient did report some abdominal fullness.  2. Reviewed that exam today was overall reassuring except for some atrophy with some pinpoint bleeding with speculum.  Reviewed other possibilities of fistula however did not note any gross feculent or urine material today.  Reviewed importance of letting me know if she sees either of these.  Will determine further work-up based on persistence of symptoms and results of pelvic ultrasound  3. The importance of keeping all planned follow-up and taking all medications as prescribed was emphasized.  4. Follow up for annual exam and after ultrasound     No orders of the defined types were placed in this encounter.         This note was electronically signed.    Cira Ortega MD  December 5, 2019    Note: Speech recognition transcription software may have been used to create portions of this document.  An attempt at proofreading has been made but errors in transcription could still be present.

## 2019-12-06 ENCOUNTER — TELEPHONE (OUTPATIENT)
Dept: OBSTETRICS AND GYNECOLOGY | Facility: CLINIC | Age: 69
End: 2019-12-06

## 2019-12-06 NOTE — TELEPHONE ENCOUNTER
Called patient to review ultrasound results but she is not home currently. Will try back this afternoon versus tomorrow morning.     Cira Ortega MD

## 2019-12-09 ENCOUNTER — TELEPHONE (OUTPATIENT)
Dept: OBSTETRICS AND GYNECOLOGY | Facility: CLINIC | Age: 69
End: 2019-12-09

## 2019-12-09 NOTE — TELEPHONE ENCOUNTER
Called patient to review ultrasound that something was seen on the vaginal apex but could not exclude simply the ovary. Overall, not too concerning of an ultrasound. Given patient's presentation reviewed I will have her come back in the next 1-2 months for follow up of symptoms and repeat pelvic exam. Suspect her symptoms were most likely from vaginal atrophy. Rhonda, she said she would have you make her appointment but routing this to you just in case.    Thanks  Cira Ortega MD

## 2019-12-16 ENCOUNTER — OUTSIDE FACILITY SERVICE (OUTPATIENT)
Dept: PAIN MEDICINE | Facility: CLINIC | Age: 69
End: 2019-12-16

## 2019-12-16 PROCEDURE — 63650 IMPLANT NEUROELECTRODES: CPT | Performed by: ANESTHESIOLOGY

## 2019-12-16 PROCEDURE — 99152 MOD SED SAME PHYS/QHP 5/>YRS: CPT | Performed by: ANESTHESIOLOGY

## 2019-12-17 ENCOUNTER — TELEPHONE (OUTPATIENT)
Dept: PAIN MEDICINE | Facility: CLINIC | Age: 69
End: 2019-12-17

## 2019-12-17 NOTE — PROGRESS NOTES
"Chief Complaint: \"I did very well during the stimulator trial. I was able to walk more.\"      Brief History: Mrs. Casandra Trinidad is a 69 y.o. female, who returns to the clinic for possible spinal cord stimulator reprogramming and removal of spinal cord stimulator trial leads placed on 12/16/2019. Ms. Casandra Trinidad reports 80% pain relief of her chronic lower back and lower extremity pain along with remarkable functional improvement with the use of her stimulator device. Patient reports significant relief of her previously severe neurogenic claudication. Patient did not require of a spinal cord stimulator reprogramming throughout her trial.  Pain level is rated as 0/10 with the stimulator \"turned on.”   Patient level ranges from 0/10 to 2/10 with the use of the spinal cord stimulator.    Patient did not take additional analgesics throughout her spinal cord stimulator trial. She has remained afebrile throughout the trial. Dressings are dry and intact. Patient denies any complications related to the procedure.   Patient reports a significant decrease in her numbness as well as her muscle cramps in the lower extremities during her trial.  Patient denies any new bladder or bowel problems.   Patient is very satisfied with the trial and would like to move forward with implantation of a spinal cord stimulator device.     Pain History:  Patient was originally referred by Dr. Melton in consultation for intractable chronic low back pain. Patient reports a 10 year history of pain, which began without incident. Since patient's last office visit on 10/08/2019 she has lost 15 pounds, she joined [employer], and has lost 31 pounds total in the past 4 months.  She is not a candidate for surgery at this time secondary to her BMI.  In addition, she has declined the possibility of surgery. She returns today for reevaluation of her chronic low back pain and to discuss further pursuing spinal cord stimulation therapies. " "She underwent pain psychology evaluation by Dr. Pearce on 05/02/2019 and per note \"From a psychological perspective, patient is considered to be an appropriate candidate for a spinal cord stimulator at this time.\"   Pain Description: constant pain with intermittent exacerbation, described as burning and stabbing sensation.   Radiation of Pain: The lower back pain radiates into the buttocks LT>RT and into the left leg down to the plantar aspect of the left foot. Pain on her right side remains minimal. The gluteal and leg pain is more severe than the lower back pain.  Pain intensity today: 6/10  Average pain intensity last week: 6/10  Pain intensity ranges from: 2/10 (sitting) to 9/10 (standing and walking)  Aggravating factors:  Pain increases with standing longer than 1 minute and ambulating more than 1 minute.  Alleviating factors: Pain decreases with sitting and lying.   Associated Symptoms:   Patient reports numbness and weakness in the lower extremities. (LT>RT)  Patient denies  any new bladder or bowel problems.   Patient reports difficulties with her balance, and reports a recent fall.      Review of previous therapies and additional medical records:  Casandra Trinidad has already failed the following measures, including:   Conservative measures: oral analgesics and physical therapy   Interventional measures: 10/21/2019: left L4-L5 and L5-S1 transforaminal steroid injections  07/24/2019: Bilateral L3-L4 transforaminal epidural steroid injections: 75% relief for one week  04/22/2019: Bilateral L3-L4 transforaminal epidural steroid injections: more than 80% relief that lasted for 6 weeks  Surgical measures: No history of lumbar spine surgery.  Casandra Trinidad underwent neurosurgical  consultation with Dr. Ady Funez on 02/26/2018, and was found not to be a surgical candidate due to her BMI.  Pain Psychology Evaluation by Dr. Pearce (05/02/2019): \"From a psychological perspective, patient is considered to be an " "appropriate candidate for a spinal cord stimulator at this time.\"  Casandra Trinidad presents with significant comorbidities including anxiety,  engaged in treatment., obesity and BPV, fibromyalgis engaged in treatment.  In terms of current analgesics, Casandra Trinidad takes: tramadol, Lidoderm patch, CBD oil, gabapentin, meloxicam and tizanidine.  Patient also takes Zoloft.  I have reviewed [facility] Report #[id number] consistent to medication reconciliation.     Global Pain Scale 04-18  2018 05-13  2019 07-19  2019 10-08  2019 12-02  2019 12-19  2019       Pain 17 5 15 17 17  4       Feelings 5 3 11 6 8  4       Clinical outcomes 17 1 10 18 13  3       Activities 22 1 15 11 11  12       GPS Total: 61 10 51 52 49  23          Review of Diagnostic Studies:    XR SPINE LUMBAR FLEX AND EXT- 04/18/2019: Anterolisthesis of L3 on L4 and L4 on L5 which is stable in both flexion and extension views.  MRI LUMBAR SPINE WO CONTRAST- 01/08/2018. Severe degenerative changes. Severe lumbar canal stenosis at L3-L4, moderate to severe at L4-L5. Leftward disc protrusion at L5-S1 that may contact the left L5 and a left descending S1 nerve roots.  There is increased lumbar lordosis. There is mild anterolisthesis of L3 from L4 and L4 from L5. There is diffuse narrowing of the AP dimension of the neural canal from T12 through L4. Collapse of the disc spaces L2-3, L3-4 and L4-5. There is no marrow edema, marrow tumor, compression fracture or evidence of high-grade malalignment. Transaxial datasets:  T12-L1: soft disc protrusion producing defect on the central dural sac and lateral recesses which is compounded by facet arthropathy and hypertrophy.  L1-L2: hard and soft disc protrusion compressing the central dural sac and this is compounded by marked arthropathy hypertrophy and bossing from the facets with ligamentum flaval hypertrophy. Therefore there is severe central and lateral recess impingement stenosis at L1-L2.  L2-L3: disc space " "collapse with a soft disc protrusion, facet hypertrophy and ligamentum flaval disease producing moderately severe central and lateral recess impingement stenosis.  L3-L4: anterolisthesis of L3 from L4, marked facet enlargement hypertrophy bossing and impingement with ligamentum flaval hypertrophy producing critical high-grade central and lateral recess impingement stenosis at the L3-L4 level.  L4-L5: soft disc protrusion with anterolisthesis of L4 from L5 combined with facet disease producing moderately severe central and moderately severe lateral recess stenosis, worse on the right than left.  L5-S1: mild soft disc protrusion effacing the central and leftward dural sac with disc space narrowing.    The following portions of the patient's history were reviewed and updated as appropriate: problem list, past medical history, past surgery history, social history, family history, medications, and allergies    Review of Systems   Endocrine: Positive for cold intolerance and polydipsia.   Musculoskeletal: Positive for arthralgias, back pain and myalgias.   Psychiatric/Behavioral: The patient is nervous/anxious (depression).    All other systems reviewed and are negative.      /70 (BP Location: Left arm, Patient Position: Sitting)   Ht 154.9 cm (60.98\")   Wt 95.7 kg (211 lb)   LMP  (LMP Unknown)   BMI 39.89 kg/m²       Physical Exam:  Constitutional: Patient is oriented to person, place, and time.   Patient appears well-developed and well-nourished.   HEENT: Head: Normocephalic and atraumatic.   Eyes: Conjunctivae and lids are normal.   Pupils: Equal, round, reactive to light.   Neck: Trachea normal. Neck supple. No JVD present.   Pulmonary Respiratory effort: No increased work of breathing or signs of respiratory distress. Auscultation of lungs: Clear to auscultation.   Cardiovascular Auscultation of heart: Normal rate and rhythm, normal S1 and S2, no murmurs.   Peripheral vascular exam: Femoral: right 2+, left " 2+. Posterior tibialis: right 2+ and left 2+. Dorsalis pedis: right 2+ and left 2+. No edema.   Abdomen: The abdomen was soft and nontender. Bowel sounds were normal.   Lymphatic: Right: No inguinal adenopathy present. Left: No inguinal adenopathy present.    Musculoskeletal   Gait and station: Gait evaluation demonstrated shuffling   Lumbar spine: Passive and active range of motion are limited secondary to pain. Extension and rotation of the lumbar spine increased and reproduced pain. Forward flexion relived her pain. Lumbar facet joint loading maneuvers are positive.  Sachin test and Gaenslen's test are negative  Piriformis maneuvers are negative.    Palpation of the bilateral ischial tuberosities, unrevealing.    Palpation of the bilateral greater trochanter, unrevealing.    Examination of the Iliotibial band: unrevealing.    Hip joints: The range of motion of the hip joints is almost full and without pain   Neurological:   Patient is alert and oriented to person, place, and time.   Speech: speech is normal.   Cortical function: Normal mental status.   Cranial nerves: Cranial nerves 2-12 intact.   Reflex Scores:  Right patellar: 2+  Left patellar: 2+  Right Achilles: 2+  Left Achilles: 2+  Motor strength: 5/5  Motor Tone: normal tone.   Involuntary movements: none.   Superficial/Primitive Reflexes: primitive reflexes were absent.   Right Mcdonnell: absent  Left Mcdonnell: absent  Right ankle clonus: absent  Left ankle clonus: absent   Negative long tract signs. Straight leg raising test is negative. Femoral stretch sign is negative.   Sensation: No sensory loss. Sensory exam: intact to light touch, intact pain and temperature sensation, intact vibration sensation and normal proprioception.   Coordination: Normal finger to nose and heel to shin. Normal balance and negative Romberg's sign   Skin and subcutaneous tissue: Skin is warm and intact. No rash noted. No cyanosis.   Psychiatric: Judgment and insight: Normal.  Orientation to person, place and time: Normal. Recent and remote memory: Intact. Mood and affect: Normal.     PROCEDURES:   Procedure #1: Analysis of the spinal cord stimulator device without spinal cord stimulator reprogramming   Patient used the [employer] spinal cord stimulator device 100% of the time since initiation of the trial phase.   There are three programs    Program ONE (Preferred Program):    Electrode polarities: 1-, 2-, 3+, 9-, 10-, 11+  Amplitude: 0.55-1 mA     Pulse width: 1000 mcs  Rate: 40 Hz  IntraBurst rate: 500 Hz  Micro-dosing ratio: 30:90  Patient experienced significant pain relief with coverage with pleasant paresthesia in all areas of chronic pain.  A copy of the telemetry report will be scanned in the patient's chart.    Procedure #2: Removal of spinal cord stimulator trial leads.   Two leads were removed with tips intact. There is no erythema, drainage, or fluid accumulation at the site. The area was cleansed with chlorhexidine.  A small Covaderm was applied.     ASSESSMENT:   1. Lumbar stenosis with neurogenic claudication    2. Spondylolisthesis of lumbar region    3. DDD (degenerative disc disease), lumbar    4. Spondylosis without myelopathy or radiculopathy, lumbar region    5. History of bilateral knee replacement    6. Morbid obesity due to excess calories (CMS/HCC)    7. History of bariatric surgery    8. CLAUDIA (generalized anxiety disorder)    9. Physical deconditioning    10. Encounter for fitting and adjustment of neuropacemaker of spinal cord        PLAN/MEDICAL DECISION MAKING: Patient's chronic pain condition has been significantly improved during her SCS trial. I had a lengthy conversation with Ms. Casandra Trinidad regarding her  chronic pain condition and potential therapeutic options including risks, benefits, alternative therapies, to name a few. Lumbar MRI on 01/08/2018 revealed grade 1 spondylolisthesis at L4 on L5, and L3 on L4. Central spinal stenosis at multiple  "levels, most severe at L3-L4, L4-L5, L2-L3. Lateral recess stenosis on the right at L4-L5. Severe disc degeneration throughout the lumbar region. Lumbar facet arthropathy. Patient has previously failed to obtain pain relief with conservative measures, and interventional pain management measures as referenced above.  Patient has been found not to be a candidate for surgery due to her BMI. In addition, she has declined the possibility of surgery. She underwent pain psychology evaluation by Dr. Pearce (05/02/2019): \"From a psychological perspective, patient is considered to be an appropriate candidate for a spinal cord stimulator at this time.\" Patient is very satisfied with the trial and would like to move forward with implantation of a spinal cord stimulator device. I have reviewed all available patient's medical records as well as previous therapies as referenced above, and discussed the patient with Dr. Dc.  Therefore, I have proposed the following plan:  1. Referral to Dr. Braulio Bird in consultation for implantation of a spinal cord stimulator device. I have recommended [employer] Penta paddle lead with the top electrodes projecting at the level of the inferior endplate of the T9 vertebral level. I have recommended [employer] Proclaim 5 XR IPG Non-Rechargeable (Full Body MRI compatible). Patient will follow-up with alice xiong.   2. Diagnostics: MRI of the thoracic spine without contrast to assess patency of thoracic epidural space for placement of surgical lead.  3. Pharmacological measures: Reviewed. Discussed.   A. Patient takes tramadol, Lidoderm patch, meloxicam, CBD oil, gabapentin, meloxicam and tizanidine.  Patient also takes Zoloft.  B. Continue Rheumate one tablet twice daily  C. Continue pyridoxine 100 mg one tablet by mouth daily  4. Long-term rehabilitation efforts:  A. Patient has been instructed regarding universal fall precautions  B. Patient will conintue a comprehensive physical therapy " program for water therapy, therapeutic exercise, core strengthening, gait and balance training and neurodynamics 4-6 weeks after implantation of her spinal cord stimulator device.  C. Continue water therapy and exercise regimen   D. Contrast therapy: Apply ice-packs for 15-20 minutes, followed by heating pads for 15-20 minutes to affected area  E. Follow up with [facility] as needed.   5. The patient has been instructed to contact my office with any questions or difficulties. The patient understands the plan and agrees to proceed accordingly.        Patient Care Team:  Tony Melton MD as PCP - General (Family Medicine)  Tony Melton MD as PCP - [facility]  Endy, Sachin Naidu MD PhD as Consulting Physician (Orthopedic Surgery)  Gagandeep Dc MD as Consulting Physician (Pain Medicine)  Olive Zhou APRN as Nurse Practitioner (Nurse Practitioner)  Cira Ortega MD as Consulting Physician (Obstetrics and Gynecology)     No orders of the defined types were placed in this encounter.        Future Appointments   Date Time Provider Department Center   1/28/2020  9:00 AM Cira Ortega MD [facility] None         Gagandeep Dc MD      EMR Dragon/Transcription disclaimer:  Much of this encounter note is an electronic transcription of spoken language to printed text. Electronic transcription of spoken language may permit erroneous, or at times, nonsensical words or phrases to be inadvertently transcribed. Although I have reviewed the note for such errors, some may still exist.

## 2019-12-17 NOTE — TELEPHONE ENCOUNTER
Patient had SCS trial with [employer]. Herbie on 12/16/2019. Spoke with patient. She states that she is doing okay. She does still have some pain but it is better than prior to procedure.

## 2019-12-19 ENCOUNTER — OFFICE VISIT (OUTPATIENT)
Dept: PAIN MEDICINE | Facility: CLINIC | Age: 69
End: 2019-12-19

## 2019-12-19 VITALS
WEIGHT: 211 LBS | DIASTOLIC BLOOD PRESSURE: 70 MMHG | BODY MASS INDEX: 39.84 KG/M2 | SYSTOLIC BLOOD PRESSURE: 122 MMHG | HEIGHT: 61 IN

## 2019-12-19 DIAGNOSIS — Z98.84 HISTORY OF BARIATRIC SURGERY: ICD-10-CM

## 2019-12-19 DIAGNOSIS — M43.16 SPONDYLOLISTHESIS OF LUMBAR REGION: ICD-10-CM

## 2019-12-19 DIAGNOSIS — M51.36 DDD (DEGENERATIVE DISC DISEASE), LUMBAR: ICD-10-CM

## 2019-12-19 DIAGNOSIS — M47.816 SPONDYLOSIS WITHOUT MYELOPATHY OR RADICULOPATHY, LUMBAR REGION: ICD-10-CM

## 2019-12-19 DIAGNOSIS — E66.01 MORBID OBESITY DUE TO EXCESS CALORIES (HCC): ICD-10-CM

## 2019-12-19 DIAGNOSIS — M48.062 LUMBAR STENOSIS WITH NEUROGENIC CLAUDICATION: ICD-10-CM

## 2019-12-19 DIAGNOSIS — F41.1 GAD (GENERALIZED ANXIETY DISORDER): ICD-10-CM

## 2019-12-19 DIAGNOSIS — Z01.818 PRE-OP TESTING: ICD-10-CM

## 2019-12-19 DIAGNOSIS — R53.81 PHYSICAL DECONDITIONING: ICD-10-CM

## 2019-12-19 DIAGNOSIS — Z96.653 HISTORY OF BILATERAL KNEE REPLACEMENT: ICD-10-CM

## 2019-12-19 DIAGNOSIS — Z46.2 ENCOUNTER FOR FITTING AND ADJUSTMENT OF NEUROPACEMAKER OF SPINAL CORD: ICD-10-CM

## 2019-12-19 DIAGNOSIS — M47.816 SPONDYLOSIS OF LUMBAR REGION WITHOUT MYELOPATHY OR RADICULOPATHY: ICD-10-CM

## 2019-12-19 DIAGNOSIS — M48.062 LUMBAR STENOSIS WITH NEUROGENIC CLAUDICATION: Primary | ICD-10-CM

## 2019-12-19 PROCEDURE — 95970 ALYS NPGT W/O PRGRMG: CPT | Performed by: ANESTHESIOLOGY

## 2019-12-19 PROCEDURE — 99024 POSTOP FOLLOW-UP VISIT: CPT | Performed by: ANESTHESIOLOGY

## 2019-12-27 ENCOUNTER — HOSPITAL ENCOUNTER (OUTPATIENT)
Dept: MRI IMAGING | Facility: HOSPITAL | Age: 69
Discharge: HOME | End: 2019-12-27
Admitting: ANESTHESIOLOGY

## 2019-12-27 PROCEDURE — 72146 MRI CHEST SPINE W/O DYE: CPT

## 2020-01-04 ENCOUNTER — OFFICE VISIT (OUTPATIENT)
Dept: NEUROSURGERY | Facility: CLINIC | Age: 70
End: 2020-01-04

## 2020-01-04 VITALS — BODY MASS INDEX: 39.08 KG/M2 | TEMPERATURE: 98.2 F | WEIGHT: 207 LBS | HEIGHT: 61 IN

## 2020-01-04 DIAGNOSIS — E66.01 OBESITY, CLASS III, BMI 40-49.9 (MORBID OBESITY) (HCC): ICD-10-CM

## 2020-01-04 DIAGNOSIS — M48.062 SPINAL STENOSIS, LUMBAR REGION, WITH NEUROGENIC CLAUDICATION: ICD-10-CM

## 2020-01-04 DIAGNOSIS — M47.817 LUMBOSACRAL SPONDYLOSIS WITHOUT MYELOPATHY: ICD-10-CM

## 2020-01-04 DIAGNOSIS — M43.10 ACQUIRED SPONDYLOLISTHESIS: ICD-10-CM

## 2020-01-04 DIAGNOSIS — M54.50 CHRONIC BILATERAL LOW BACK PAIN WITHOUT SCIATICA: Primary | ICD-10-CM

## 2020-01-04 DIAGNOSIS — G89.29 CHRONIC BILATERAL LOW BACK PAIN WITHOUT SCIATICA: Primary | ICD-10-CM

## 2020-01-04 PROCEDURE — 99213 OFFICE O/P EST LOW 20 MIN: CPT | Performed by: NEUROLOGICAL SURGERY

## 2020-01-04 NOTE — PROGRESS NOTES
Patient: Casandra Trinidad  : 1950    Primary Care Provider: Tony Melton MD    Requesting Provider: As above        History    Chief Complaint: Back and bilateral lower extremity pain with sensory alteration.    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  She has had chronic low back pain that has been worse over the last year.  She complains of numbness and burning in her vagina and legs.  This has increased over the last 6 months.  She had an epidural injection performed which was modestly helpful.  Recently a trial of a spinal cord stimulator helped moderately with her symptoms.  Her pain is worse with standing for greater than a minute or sitting in a hard chair.  She does better lying down.  She has better flexing at the waist.  She saw my former colleague, Dr. Funez, a couple of years ago and he had recommended substantial weight loss before considering any surgery.  She has been doing weight watchers and water aerobics.  She has lost 35 pounds.  She has some occasional vaginal numbness.  In the mornings she cannot always feel when she is voiding.    Review of Systems   Constitutional: Positive for activity change. Negative for appetite change, chills, diaphoresis, fatigue, fever and unexpected weight change.   HENT: Negative for congestion, dental problem, drooling, ear discharge, ear pain, facial swelling, hearing loss, mouth sores, nosebleeds, postnasal drip, rhinorrhea, sinus pressure, sneezing, sore throat, tinnitus, trouble swallowing and voice change.    Eyes: Negative for photophobia, pain, discharge, redness, itching and visual disturbance.   Respiratory: Negative for apnea, cough, choking, chest tightness, shortness of breath, wheezing and stridor.    Cardiovascular: Negative for chest pain, palpitations and leg swelling.   Gastrointestinal: Positive for constipation. Negative for abdominal distention,  "abdominal pain, anal bleeding, blood in stool, diarrhea, nausea, rectal pain and vomiting.   Endocrine: Positive for polydipsia.   Musculoskeletal: Positive for arthralgias, back pain and gait problem. Negative for joint swelling, myalgias, neck pain and neck stiffness.   Skin: Negative for color change, pallor, rash and wound.   Allergic/Immunologic: Positive for environmental allergies. Negative for food allergies and immunocompromised state.   Neurological: Positive for dizziness, weakness and numbness. Negative for tremors, seizures, syncope, facial asymmetry, speech difficulty, light-headedness and headaches.   Hematological: Negative for adenopathy. Does not bruise/bleed easily.   Psychiatric/Behavioral: Positive for dysphoric mood. Negative for agitation, behavioral problems, confusion, decreased concentration, self-injury, sleep disturbance and suicidal ideas. The patient is not nervous/anxious and is not hyperactive.        The patient's past medical history, past surgical history, family history, and social history have been reviewed at length in the electronic medical record.    Physical Exam:   Temp 98.2 °F (36.8 °C)   Ht 154.9 cm (61\")   Wt 93.9 kg (207 lb)   LMP  (LMP Unknown)   BMI 39.11 kg/m²   CONSTITUTIONAL: Patient is well-nourished, pleasant and appears stated age.  CV: Heart regular rate and rhythm without murmur, rub, or gallop.  PULMONARY: Lungs are clear to ascultation.  MUSCULOSKELETAL:  Straight leg raising is negative.  Sachin's Sign is negative.  ROM in back is limited in all directions.  Tenderness in the back to palpation is not observed.  NEUROLOGICAL:  Orientation, memory, attention span, language function, and cognition have been examined and are intact.  Strength is intact in the lower extremities to direct testing.  Muscle tone is normal throughout.  Station and gait are somewhat stooped and lurching.  Sensation is diminished to light touch testing in a stocking " distribution.  Deep tendon reflexes are focal to elicit throughout.  Coordination is intact.      Medical Decision Making    Data Review:   MRI of the lumbar spine dated 1/8/2018 demonstrates a low-grade listhesis of L3 on L4 and L4 on L5.  There is mild canal narrowing at T12-L1.  There is generous stenosis at L1-2, L2-3, L3-4, and L4-5.  Thoracic MRI study dated 12/27/2019 demonstrates some wedging of the disc space at T10-11.  There is a fair amount of narrowing at that level.  Her canal is fairly open behind the T9 vertebral body.    Diagnosis:   1.  Lumbar stenosis with neurogenic claudication.  2.  Lumbar spondylolisthesis.  3.  Thoracic spondylosis with some degree of stenosis.    Treatment Options:   Dr. Dc has recommended [employer] epidural spinal cord stimulator placement with the Penta lead projecting to the superior T9 vertebral level.  This is probably doable but not without risk given some of the stenosis in her spine.  Frankly, if one is going to consider spinal cord stimulator placement I would probably utilize wire leads rather than a surgical lead given the narrowing.  Her other option would be to consider lumbar intervention.  She has lost weight and I do not think that is out of the question.  I am going to obtain a new MRI of her lumbar spine as well as flexion-extension upright lumbar spine x-rays to assess for instability.  At her next visit we will discuss treatment options.       Diagnosis Plan   1. Chronic bilateral low back pain without sciatica     2. Lumbosacral spondylosis without myelopathy  MRI Lumbar Spine Without Contrast   3. Acquired spondylolisthesis  XR spine lumbar flex and ext   4. Spinal stenosis, lumbar region, with neurogenic claudication     5. Obesity, Class III, BMI 40-49.9 (morbid obesity) (CMS/[facility])         Scribed for Braulio Bird MD by Abelardo Song CMA on 01/04/2020 at 8:26 AM      I, Dr. Bird, personally performed the services described in the  documentation, as scribed in my presence, and it is both accurate and complete.

## 2020-01-10 ENCOUNTER — PREP FOR SURGERY (OUTPATIENT)
Dept: OTHER | Facility: HOSPITAL | Age: 70
End: 2020-01-10

## 2020-01-10 ENCOUNTER — OFFICE VISIT (OUTPATIENT)
Dept: NEUROSURGERY | Facility: CLINIC | Age: 70
End: 2020-01-10

## 2020-01-10 ENCOUNTER — HOSPITAL ENCOUNTER (OUTPATIENT)
Dept: MRI IMAGING | Facility: HOSPITAL | Age: 70
Discharge: HOME | End: 2020-01-10
Admitting: NEUROLOGICAL SURGERY

## 2020-01-10 ENCOUNTER — HOSPITAL ENCOUNTER (OUTPATIENT)
Dept: GENERAL RADIOLOGY | Facility: HOSPITAL | Age: 70
Discharge: HOME | End: 2020-01-10

## 2020-01-10 VITALS — HEIGHT: 61 IN | TEMPERATURE: 98.5 F | BODY MASS INDEX: 39.08 KG/M2 | WEIGHT: 207 LBS

## 2020-01-10 DIAGNOSIS — M48.062 SPINAL STENOSIS, LUMBAR REGION, WITH NEUROGENIC CLAUDICATION: Primary | ICD-10-CM

## 2020-01-10 DIAGNOSIS — M43.10 ACQUIRED SPONDYLOLISTHESIS: ICD-10-CM

## 2020-01-10 DIAGNOSIS — M47.817 LUMBOSACRAL SPONDYLOSIS WITHOUT MYELOPATHY: ICD-10-CM

## 2020-01-10 DIAGNOSIS — M48.062 LUMBAR STENOSIS WITH NEUROGENIC CLAUDICATION: Primary | ICD-10-CM

## 2020-01-10 DIAGNOSIS — M43.16 SPONDYLOLISTHESIS OF LUMBAR REGION: ICD-10-CM

## 2020-01-10 PROCEDURE — 72120 X-RAY BEND ONLY L-S SPINE: CPT

## 2020-01-10 PROCEDURE — 99213 OFFICE O/P EST LOW 20 MIN: CPT | Performed by: NEUROLOGICAL SURGERY

## 2020-01-10 PROCEDURE — 72148 MRI LUMBAR SPINE W/O DYE: CPT

## 2020-01-10 RX ORDER — IBUPROFEN 800 MG/1
800 TABLET ORAL ONCE
Status: CANCELLED | OUTPATIENT
Start: 2020-01-10 | End: 2020-01-10

## 2020-01-10 RX ORDER — CEFAZOLIN SODIUM 2 G/100ML
2 INJECTION, SOLUTION INTRAVENOUS ONCE
Status: CANCELLED | OUTPATIENT
Start: 2020-01-10 | End: 2020-01-10

## 2020-01-10 RX ORDER — ACETAMINOPHEN 500 MG
1000 TABLET ORAL ONCE
Status: CANCELLED | OUTPATIENT
Start: 2020-01-10 | End: 2020-01-10

## 2020-01-10 RX ORDER — OXYCODONE HCL 10 MG/1
10 TABLET, FILM COATED, EXTENDED RELEASE ORAL ONCE
Status: CANCELLED | OUTPATIENT
Start: 2020-01-10 | End: 2020-01-10

## 2020-01-10 NOTE — H&P (VIEW-ONLY)
Patient: Casandra Trinidad  : 1950     Primary Care Provider: Tony Melton MD     Requesting Provider: As above           History     Chief Complaint: Bilateral lower extremity pain with sensory alteration.     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  She has had chronic low back pain that has been worse over the last year.  She complains of numbness and burning in her vagina and legs.  This has increased over the last 6 months.  She had an epidural injection performed which was modestly helpful.  Recently a trial of a spinal cord stimulator helped moderately with her symptoms.  Her pain is worse with standing for greater than a minute or sitting in a hard chair.  She does better lying down.  She is better flexing at the waist.  She saw my former colleague, Dr. Funez, a couple of years ago and he had recommended substantial weight loss before considering any surgery.  She has been doing weight watchers and water aerobics.  She has lost 35 pounds.  She has some occasional vaginal numbness.  In the mornings she cannot always feel when she is voiding.  She has had buttock numbness for some time but she feels that is getting worse.     Review of Systems   Constitutional: Positive for activity change. Negative for appetite change, chills, diaphoresis, fatigue, fever and unexpected weight change.   HENT: Negative for congestion, dental problem, drooling, ear discharge, ear pain, facial swelling, hearing loss, mouth sores, nosebleeds, postnasal drip, rhinorrhea, sinus pressure, sneezing, sore throat, tinnitus, trouble swallowing and voice change.    Eyes: Negative for photophobia, pain, discharge, redness, itching and visual disturbance.   Respiratory: Negative for apnea, cough, choking, chest tightness, shortness of breath, wheezing and stridor.    Cardiovascular: Negative for chest pain, palpitations and leg swelling.    Gastrointestinal: Positive for constipation. Negative for abdominal distention, abdominal pain, anal bleeding, blood in stool, diarrhea, nausea, rectal pain and vomiting.   Endocrine: Positive for polydipsia.   Musculoskeletal: Positive for arthralgias, back pain and gait problem. Negative for joint swelling, myalgias, neck pain and neck stiffness.   Skin: Negative for color change, pallor, rash and wound.   Allergic/Immunologic: Positive for environmental allergies. Negative for food allergies and immunocompromised state.   Neurological: Positive for dizziness, weakness and numbness. Negative for tremors, seizures, syncope, facial asymmetry, speech difficulty, light-headedness and headaches.   Hematological: Negative for adenopathy. Does not bruise/bleed easily.   Psychiatric/Behavioral: Positive for dysphoric mood. Negative for agitation, behavioral problems, confusion, decreased concentration, self-injury, sleep disturbance and suicidal ideas. The patient is not nervous/anxious and is not hyperactive.          The patient's past medical history, past surgical history, family history, and social history have been reviewed at length in the electronic medical record.     Past Medical History:   Diagnosis Date   • Age related cataract    • Arthropathy, lower leg    • Dehiscence of incision    • Ear itch    • Myalgia and myositis    • Pain in joint, ankle and foot    • Pain in joint, hand    • Pain in joint, lower leg    • Tenosynovitis of finger    • Vitamin D deficiency      Past Surgical History:   Procedure Laterality Date   • BREAST LUMPECTOMY Right 2014   • CATARACT EXTRACTION     •  SECTION      W/ TUBAL LIGATION   • CHOLECYSTECTOMY      LAP   • HYSTERECTOMY      W/ SUPRAPUBIC CYSTOMY/RETROPUBIC URETHROTOMY/POSTERIOR AND ANTERIOR COLORRAPHY. Ovaries retained for urinary incontinence    • LAPAROSCOPIC GASTRIC BANDING  2007    W/ HHR ([facility])   • REPLACEMENT TOTAL KNEE BILATERAL     •  TUBAL ABDOMINAL LIGATION  1980     Family History   Problem Relation Age of Onset   • COPD Mother    • Emphysema Mother    • Diabetes Mother    • Heart failure Mother    • COPD Father    • Pneumonia Father    • Mental illness Father    • COPD Sister    • Breast cancer Maternal Aunt      Social History     Socioeconomic History   • Marital status:      Spouse name: Not on file   • Number of children: Not on file   • Years of education: Not on file   • Highest education level: Not on file   Tobacco Use   • Smoking status: Never Smoker   • Smokeless tobacco: Never Used   Substance and Sexual Activity   • Alcohol use: No   • Drug use: No   • Sexual activity: Never     Allergies   Allergen Reactions   • Codeine GI Intolerance   • Diclofenac Nausea And Vomiting   • Lortab [Hydrocodone-Acetaminophen] Nausea And Vomiting   • Tyloxapol Nausea And Vomiting       Current Outpatient Medications on File Prior to Visit   Medication Sig Dispense Refill   • aspirin 81 MG EC tablet Take 81 mg by mouth daily.     • Calcium Carbonate-Vitamin D (CALCIUM 600+D) 600-200 MG-UNIT tablet Take 1 tablet by mouth 2 (two) times a day.     • CBD (cannabidiol) oral oil Take  by mouth.     • Dietary Management Product (RHEUMATE) capsule Take 1 capsule twice daily (Patient taking differently: Take 1 capsule by mouth Daily. Take 1 capsule twice daily) 180 capsule 1   • gabapentin (NEURONTIN) 300 MG capsule Take 1 capsule by mouth 3 (Three) Times a Day As Needed (neuropathic pain). 270 capsule 1   • lidocaine (LIDODERM) 5 % Place 3 patches on the skin as directed by provider Daily. Remove & Discard patch within 12 hours or as directed by  patch 3   • meclizine (ANTIVERT) 25 MG tablet Take 1 tablet by mouth 3 (Three) Times a Day As Needed for dizziness. 90 tablet 3   • meloxicam (MOBIC) 15 MG tablet Take 1 tablet by mouth Daily. 90 tablet 3   • Multiple Vitamins-Minerals (ICAPS AREDS 2 PO) Take 1 capsule by mouth every night.     •  "omeprazole (priLOSEC) 20 MG capsule Take 1 capsule by mouth Daily. 90 capsule 3   • sertraline (ZOLOFT) 25 MG tablet Take 1 tablet by mouth Daily. 90 tablet 3   • tiZANidine (ZANAFLEX) 2 MG tablet Take 1 tablet by mouth 3 (Three) Times a Day. 270 tablet 1   • traMADol (ULTRAM) 50 MG tablet Take 1 tablet by mouth Every 6 (Six) Hours As Needed for Moderate Pain . 360 tablet 1   • vitamin B-6 (PYRIDOXINE) 100 MG tablet Take 1 tablet by mouth Daily. 90 tablet 5     No current facility-administered medications on file prior to visit.        Physical Exam:   Temp 98.5 °F (36.9 °C)   Ht 154.9 cm (61\")   Wt 93.9 kg (207 lb)   LMP  (LMP Unknown)   BMI 39.11 kg/m²   MUSCULOSKELETAL:  Straight leg raising is negative.  Sachin's Sign is negative.  ROM in back is limited in all directions.  Tenderness in the back to palpation is not observed.  NEUROLOGICAL:  Strength is intact in the lower extremities to direct testing.  Muscle tone is normal throughout.  Station and gait are lurching and limping and she utilizes a cane.  Sensation is intact to light touch testing throughout.     Medical Decision Making     Data Review:   Lumbar MRI study demonstrates diffuse degenerative disc disease.  There is a low-grade offset of L3 on L4 and L4 on L5.  There is moderate stenosis at L1-2 and L2-3.  High-grade stenosis is noted at L3-4 and L4-5.  There is some angulation at the disc space level at T10-11.  Flexion-extension films reveal a little bit of movement from flexion to extension greater at L4-5 then at L3-4.     Diagnosis:   1.  Lumbar stenosis with neurogenic claudication.  2.  Lumbar spondylolisthesis with instability.  3.  Mechanical low back pain.     Treatment Options:   I have further reviewed her prior thoracic and the more recent lumbar MRI.  I think it would be feasible to place a surgical lead to the superior T9 level.  This would simply involve a bit more bone removal than usual.  Previously I was concerned about that " but I think it is doable.  However, spinal cord stimulator placement is not going to help with her progressive weakness and numbness which is concerning.  As such, I have recommended lumbar decompression and fusion at L3-5 with at least bilateral generous foraminotomies at L1-2 and L2-3.  The goal of surgery would be to address her claudication symptoms and to prevent further neurologic compromise.  It is possible that she could develop additional instability and require additional surgery.  If she continues to have substantial pain this would not burn bridges in terms of future spinal cord stimulator placement.  The nature of the procedure as well as the potential risks, complications, limitations, and alternatives to the procedure were discussed at length with the patient and the patient has agreed to proceed with surgery.  Of note, many narcotics make her nauseous.  She has tolerated Dilaudid in the past.          Diagnosis Plan   1. Spinal stenosis, lumbar region, with neurogenic claudication      2. Spondylolisthesis of lumbar region

## 2020-01-10 NOTE — H&P
Patient: Casandra Trinidad  : 1950     Primary Care Provider: Toyn Melton MD     Requesting Provider: As above           History     Chief Complaint: Bilateral lower extremity pain with sensory alteration.     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  She has had chronic low back pain that has been worse over the last year.  She complains of numbness and burning in her vagina and legs.  This has increased over the last 6 months.  She had an epidural injection performed which was modestly helpful.  Recently a trial of a spinal cord stimulator helped moderately with her symptoms.  Her pain is worse with standing for greater than a minute or sitting in a hard chair.  She does better lying down.  She is better flexing at the waist.  She saw my former colleague, Dr. Funez, a couple of years ago and he had recommended substantial weight loss before considering any surgery.  She has been doing weight watchers and water aerobics.  She has lost 35 pounds.  She has some occasional vaginal numbness.  In the mornings she cannot always feel when she is voiding.  She has had buttock numbness for some time but she feels that is getting worse.     Review of Systems   Constitutional: Positive for activity change. Negative for appetite change, chills, diaphoresis, fatigue, fever and unexpected weight change.   HENT: Negative for congestion, dental problem, drooling, ear discharge, ear pain, facial swelling, hearing loss, mouth sores, nosebleeds, postnasal drip, rhinorrhea, sinus pressure, sneezing, sore throat, tinnitus, trouble swallowing and voice change.    Eyes: Negative for photophobia, pain, discharge, redness, itching and visual disturbance.   Respiratory: Negative for apnea, cough, choking, chest tightness, shortness of breath, wheezing and stridor.    Cardiovascular: Negative for chest pain, palpitations and leg swelling.    Gastrointestinal: Positive for constipation. Negative for abdominal distention, abdominal pain, anal bleeding, blood in stool, diarrhea, nausea, rectal pain and vomiting.   Endocrine: Positive for polydipsia.   Musculoskeletal: Positive for arthralgias, back pain and gait problem. Negative for joint swelling, myalgias, neck pain and neck stiffness.   Skin: Negative for color change, pallor, rash and wound.   Allergic/Immunologic: Positive for environmental allergies. Negative for food allergies and immunocompromised state.   Neurological: Positive for dizziness, weakness and numbness. Negative for tremors, seizures, syncope, facial asymmetry, speech difficulty, light-headedness and headaches.   Hematological: Negative for adenopathy. Does not bruise/bleed easily.   Psychiatric/Behavioral: Positive for dysphoric mood. Negative for agitation, behavioral problems, confusion, decreased concentration, self-injury, sleep disturbance and suicidal ideas. The patient is not nervous/anxious and is not hyperactive.          The patient's past medical history, past surgical history, family history, and social history have been reviewed at length in the electronic medical record.     Past Medical History:   Diagnosis Date   • Age related cataract    • Arthropathy, lower leg    • Dehiscence of incision    • Ear itch    • Myalgia and myositis    • Pain in joint, ankle and foot    • Pain in joint, hand    • Pain in joint, lower leg    • Tenosynovitis of finger    • Vitamin D deficiency      Past Surgical History:   Procedure Laterality Date   • BREAST LUMPECTOMY Right 2014   • CATARACT EXTRACTION     •  SECTION      W/ TUBAL LIGATION   • CHOLECYSTECTOMY      LAP   • HYSTERECTOMY      W/ SUPRAPUBIC CYSTOMY/RETROPUBIC URETHROTOMY/POSTERIOR AND ANTERIOR COLORRAPHY. Ovaries retained for urinary incontinence    • LAPAROSCOPIC GASTRIC BANDING  2007    W/ HHR ([facility])   • REPLACEMENT TOTAL KNEE BILATERAL     •  TUBAL ABDOMINAL LIGATION  1980     Family History   Problem Relation Age of Onset   • COPD Mother    • Emphysema Mother    • Diabetes Mother    • Heart failure Mother    • COPD Father    • Pneumonia Father    • Mental illness Father    • COPD Sister    • Breast cancer Maternal Aunt      Social History     Socioeconomic History   • Marital status:      Spouse name: Not on file   • Number of children: Not on file   • Years of education: Not on file   • Highest education level: Not on file   Tobacco Use   • Smoking status: Never Smoker   • Smokeless tobacco: Never Used   Substance and Sexual Activity   • Alcohol use: No   • Drug use: No   • Sexual activity: Never     Allergies   Allergen Reactions   • Codeine GI Intolerance   • Diclofenac Nausea And Vomiting   • Lortab [Hydrocodone-Acetaminophen] Nausea And Vomiting   • Tyloxapol Nausea And Vomiting       Current Outpatient Medications on File Prior to Visit   Medication Sig Dispense Refill   • aspirin 81 MG EC tablet Take 81 mg by mouth daily.     • Calcium Carbonate-Vitamin D (CALCIUM 600+D) 600-200 MG-UNIT tablet Take 1 tablet by mouth 2 (two) times a day.     • CBD (cannabidiol) oral oil Take  by mouth.     • Dietary Management Product (RHEUMATE) capsule Take 1 capsule twice daily (Patient taking differently: Take 1 capsule by mouth Daily. Take 1 capsule twice daily) 180 capsule 1   • gabapentin (NEURONTIN) 300 MG capsule Take 1 capsule by mouth 3 (Three) Times a Day As Needed (neuropathic pain). 270 capsule 1   • lidocaine (LIDODERM) 5 % Place 3 patches on the skin as directed by provider Daily. Remove & Discard patch within 12 hours or as directed by  patch 3   • meclizine (ANTIVERT) 25 MG tablet Take 1 tablet by mouth 3 (Three) Times a Day As Needed for dizziness. 90 tablet 3   • meloxicam (MOBIC) 15 MG tablet Take 1 tablet by mouth Daily. 90 tablet 3   • Multiple Vitamins-Minerals (ICAPS AREDS 2 PO) Take 1 capsule by mouth every night.     •  "omeprazole (priLOSEC) 20 MG capsule Take 1 capsule by mouth Daily. 90 capsule 3   • sertraline (ZOLOFT) 25 MG tablet Take 1 tablet by mouth Daily. 90 tablet 3   • tiZANidine (ZANAFLEX) 2 MG tablet Take 1 tablet by mouth 3 (Three) Times a Day. 270 tablet 1   • traMADol (ULTRAM) 50 MG tablet Take 1 tablet by mouth Every 6 (Six) Hours As Needed for Moderate Pain . 360 tablet 1   • vitamin B-6 (PYRIDOXINE) 100 MG tablet Take 1 tablet by mouth Daily. 90 tablet 5     No current facility-administered medications on file prior to visit.        Physical Exam:   Temp 98.5 °F (36.9 °C)   Ht 154.9 cm (61\")   Wt 93.9 kg (207 lb)   LMP  (LMP Unknown)   BMI 39.11 kg/m²   MUSCULOSKELETAL:  Straight leg raising is negative.  Sachin's Sign is negative.  ROM in back is limited in all directions.  Tenderness in the back to palpation is not observed.  NEUROLOGICAL:  Strength is intact in the lower extremities to direct testing.  Muscle tone is normal throughout.  Station and gait are lurching and limping and she utilizes a cane.  Sensation is intact to light touch testing throughout.     Medical Decision Making     Data Review:   Lumbar MRI study demonstrates diffuse degenerative disc disease.  There is a low-grade offset of L3 on L4 and L4 on L5.  There is moderate stenosis at L1-2 and L2-3.  High-grade stenosis is noted at L3-4 and L4-5.  There is some angulation at the disc space level at T10-11.  Flexion-extension films reveal a little bit of movement from flexion to extension greater at L4-5 then at L3-4.     Diagnosis:   1.  Lumbar stenosis with neurogenic claudication.  2.  Lumbar spondylolisthesis with instability.  3.  Mechanical low back pain.     Treatment Options:   I have further reviewed her prior thoracic and the more recent lumbar MRI.  I think it would be feasible to place a surgical lead to the superior T9 level.  This would simply involve a bit more bone removal than usual.  Previously I was concerned about that " but I think it is doable.  However, spinal cord stimulator placement is not going to help with her progressive weakness and numbness which is concerning.  As such, I have recommended lumbar decompression and fusion at L3-5 with at least bilateral generous foraminotomies at L1-2 and L2-3.  The goal of surgery would be to address her claudication symptoms and to prevent further neurologic compromise.  It is possible that she could develop additional instability and require additional surgery.  If she continues to have substantial pain this would not burn bridges in terms of future spinal cord stimulator placement.  The nature of the procedure as well as the potential risks, complications, limitations, and alternatives to the procedure were discussed at length with the patient and the patient has agreed to proceed with surgery.  Of note, many narcotics make her nauseous.  She has tolerated Dilaudid in the past.          Diagnosis Plan   1. Spinal stenosis, lumbar region, with neurogenic claudication      2. Spondylolisthesis of lumbar region

## 2020-01-10 NOTE — PROGRESS NOTES
Patient: Casandra Trinidad  : 1950    Primary Care Provider: Tony Melton MD    Requesting Provider: As above        History    Chief Complaint: Bilateral lower extremity pain with sensory alteration.    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  She has had chronic low back pain that has been worse over the last year.  She complains of numbness and burning in her vagina and legs.  This has increased over the last 6 months.  She had an epidural injection performed which was modestly helpful.  Recently a trial of a spinal cord stimulator helped moderately with her symptoms.  Her pain is worse with standing for greater than a minute or sitting in a hard chair.  She does better lying down.  She is better flexing at the waist.  She saw my former colleague, Dr. Funez, a couple of years ago and he had recommended substantial weight loss before considering any surgery.  She has been doing weight watchers and water aerobics.  She has lost 35 pounds.  She has some occasional vaginal numbness.  In the mornings she cannot always feel when she is voiding.  She has had buttock numbness for some time but she feels that is getting worse.    Review of Systems   Constitutional: Positive for activity change. Negative for appetite change, chills, diaphoresis, fatigue, fever and unexpected weight change.   HENT: Negative for congestion, dental problem, drooling, ear discharge, ear pain, facial swelling, hearing loss, mouth sores, nosebleeds, postnasal drip, rhinorrhea, sinus pressure, sneezing, sore throat, tinnitus, trouble swallowing and voice change.    Eyes: Negative for photophobia, pain, discharge, redness, itching and visual disturbance.   Respiratory: Negative for apnea, cough, choking, chest tightness, shortness of breath, wheezing and stridor.    Cardiovascular: Negative for chest pain, palpitations and leg swelling.  "  Gastrointestinal: Positive for constipation. Negative for abdominal distention, abdominal pain, anal bleeding, blood in stool, diarrhea, nausea, rectal pain and vomiting.   Endocrine: Positive for polydipsia.   Musculoskeletal: Positive for arthralgias, back pain and gait problem. Negative for joint swelling, myalgias, neck pain and neck stiffness.   Skin: Negative for color change, pallor, rash and wound.   Allergic/Immunologic: Positive for environmental allergies. Negative for food allergies and immunocompromised state.   Neurological: Positive for dizziness, weakness and numbness. Negative for tremors, seizures, syncope, facial asymmetry, speech difficulty, light-headedness and headaches.   Hematological: Negative for adenopathy. Does not bruise/bleed easily.   Psychiatric/Behavioral: Positive for dysphoric mood. Negative for agitation, behavioral problems, confusion, decreased concentration, self-injury, sleep disturbance and suicidal ideas. The patient is not nervous/anxious and is not hyperactive.        The patient's past medical history, past surgical history, family history, and social history have been reviewed at length in the electronic medical record.    Physical Exam:   Temp 98.5 °F (36.9 °C)   Ht 154.9 cm (61\")   Wt 93.9 kg (207 lb)   LMP  (LMP Unknown)   BMI 39.11 kg/m²   MUSCULOSKELETAL:  Straight leg raising is negative.  Sachin's Sign is negative.  ROM in back is limited in all directions.  Tenderness in the back to palpation is not observed.  NEUROLOGICAL:  Strength is intact in the lower extremities to direct testing.  Muscle tone is normal throughout.  Station and gait are lurching and limping and she utilizes a cane.  Sensation is intact to light touch testing throughout.    Medical Decision Making    Data Review:   Lumbar MRI study demonstrates diffuse degenerative disc disease.  There is a low-grade offset of L3 on L4 and L4 on L5.  There is moderate stenosis at L1-2 and L2-3.  " High-grade stenosis is noted at L3-4 and L4-5.  There is some angulation at the disc space level at T10-11.  Flexion-extension films reveal a little bit of movement from flexion to extension greater at L4-5 then at L3-4.    Diagnosis:   1.  Lumbar stenosis with neurogenic claudication.  2.  Lumbar spondylolisthesis with instability.  3.  Mechanical low back pain.    Treatment Options:   I have further reviewed her prior thoracic and the more recent lumbar MRI.  I think it would be feasible to place a surgical lead to the superior T9 level.  This would simply involve a bit more bone removal than usual.  Previously I was concerned about that but I think it is doable.  However, spinal cord stimulator placement is not going to help with her progressive weakness and numbness which is concerning.  As such, I have recommended lumbar decompression and fusion at L3-5 with at least bilateral generous foraminotomies at L1-2 and L2-3.  The goal of surgery would be to address her claudication symptoms and to prevent further neurologic compromise.  It is possible that she could develop additional instability and require additional surgery.  If she continues to have substantial pain this would not burn bridges in terms of future spinal cord stimulator placement.  The nature of the procedure as well as the potential risks, complications, limitations, and alternatives to the procedure were discussed at length with the patient and the patient has agreed to proceed with surgery.  Of note, many narcotics make her nauseous.  She has tolerated Dilaudid in the past.       Diagnosis Plan   1. Spinal stenosis, lumbar region, with neurogenic claudication     2. Spondylolisthesis of lumbar region             I, Dr. Bird, personally performed the services described in the documentation, as scribed in my presence, and it is both accurate and complete.

## 2020-01-12 ENCOUNTER — APPOINTMENT (OUTPATIENT)
Dept: PREADMISSION TESTING | Facility: HOSPITAL | Age: 70
End: 2020-01-12

## 2020-01-12 VITALS — BODY MASS INDEX: 39 KG/M2 | HEIGHT: 61 IN | WEIGHT: 206.57 LBS

## 2020-01-12 DIAGNOSIS — M48.062 LUMBAR STENOSIS WITH NEUROGENIC CLAUDICATION: ICD-10-CM

## 2020-01-12 LAB
DEPRECATED RDW RBC AUTO: 43.3 FL (ref 37–54)
ERYTHROCYTE [DISTWIDTH] IN BLOOD BY AUTOMATED COUNT: 13.2 % (ref 12.3–15.4)
HBA1C MFR BLD: 5.5 % (ref 4.8–5.6)
HCT VFR BLD AUTO: 41.1 % (ref 34–46.6)
HGB BLD-MCNC: 13.3 G/DL (ref 12–15.9)
MCH RBC QN AUTO: 28.5 PG (ref 26.6–33)
MCHC RBC AUTO-ENTMCNC: 32.4 G/DL (ref 31.5–35.7)
MCV RBC AUTO: 88.2 FL (ref 79–97)
PLATELET # BLD AUTO: 271 10*3/MM3 (ref 140–450)
PMV BLD AUTO: 11.6 FL (ref 6–12)
RBC # BLD AUTO: 4.66 10*6/MM3 (ref 3.77–5.28)
WBC NRBC COR # BLD: 6.12 10*3/MM3 (ref 3.4–10.8)

## 2020-01-12 PROCEDURE — 87081 CULTURE SCREEN ONLY: CPT | Performed by: NEUROLOGICAL SURGERY

## 2020-01-12 PROCEDURE — 36415 COLL VENOUS BLD VENIPUNCTURE: CPT

## 2020-01-12 PROCEDURE — 85027 COMPLETE CBC AUTOMATED: CPT | Performed by: ANESTHESIOLOGY

## 2020-01-12 PROCEDURE — 93005 ELECTROCARDIOGRAM TRACING: CPT

## 2020-01-12 PROCEDURE — 83036 HEMOGLOBIN GLYCOSYLATED A1C: CPT | Performed by: ANESTHESIOLOGY

## 2020-01-12 NOTE — PAT
Colon screening information booklet given to patient during PAT visit  Bactroban and Chlorhexidine Prescription given during PAT visit, as well as written and verbal instructions given to patient during PAT visit.  Patient/family also instructed to complete skin prep checklist and return the checklist on the day of surgery to preoperative staff.  Patient/family verbalized understanding.  Patient instructed to drink 20 ounces (or until full) of Gatorade and it needs to be completed 1 hour before given arrival time for procedure (NO RED Gatorade)    Patient verbalized understanding.  Patient to apply Chlorhexadine wipes  to surgical area (as instructed) the night before procedure and the AM of procedure. Wipes provided.

## 2020-01-13 LAB — MRSA SPEC QL CULT: NORMAL

## 2020-01-18 ENCOUNTER — ANESTHESIA EVENT (OUTPATIENT)
Dept: PERIOP | Facility: HOSPITAL | Age: 70
End: 2020-01-18

## 2020-01-20 ENCOUNTER — ANESTHESIA (OUTPATIENT)
Dept: PERIOP | Facility: HOSPITAL | Age: 70
End: 2020-01-20

## 2020-01-20 ENCOUNTER — APPOINTMENT (OUTPATIENT)
Dept: GENERAL RADIOLOGY | Facility: HOSPITAL | Age: 70
End: 2020-01-20

## 2020-01-20 ENCOUNTER — HOSPITAL ENCOUNTER (INPATIENT)
Facility: HOSPITAL | Age: 70
LOS: 5 days | Discharge: HOME HEALTH | End: 2020-01-25
Attending: NEUROLOGICAL SURGERY | Admitting: NEUROLOGICAL SURGERY

## 2020-01-20 DIAGNOSIS — M48.062 LUMBAR STENOSIS WITH NEUROGENIC CLAUDICATION: ICD-10-CM

## 2020-01-20 PROCEDURE — 25010000003 LIDOCAINE 1 % SOLUTION: Performed by: NURSE ANESTHETIST, CERTIFIED REGISTERED

## 2020-01-20 PROCEDURE — 25010000002 FENTANYL CITRATE (PF) 100 MCG/2ML SOLUTION: Performed by: NURSE ANESTHETIST, CERTIFIED REGISTERED

## 2020-01-20 PROCEDURE — C1713 ANCHOR/SCREW BN/BN,TIS/BN: HCPCS | Performed by: NEUROLOGICAL SURGERY

## 2020-01-20 PROCEDURE — 25010000002 NEOSTIGMINE 10 MG/10ML SOLUTION: Performed by: NURSE ANESTHETIST, CERTIFIED REGISTERED

## 2020-01-20 PROCEDURE — 25010000003 CEFAZOLIN IN DEXTROSE 2-4 GM/100ML-% SOLUTION: Performed by: NEUROLOGICAL SURGERY

## 2020-01-20 PROCEDURE — 22632 ARTHRD PST TQ 1NTRSPC LM EA: CPT | Performed by: NEUROLOGICAL SURGERY

## 2020-01-20 PROCEDURE — 0SB20ZZ EXCISION OF LUMBAR VERTEBRAL DISC, OPEN APPROACH: ICD-10-PCS | Performed by: NEUROLOGICAL SURGERY

## 2020-01-20 PROCEDURE — 25010000002 PROPOFOL 10 MG/ML EMULSION: Performed by: NURSE ANESTHETIST, CERTIFIED REGISTERED

## 2020-01-20 PROCEDURE — 00QT0ZZ REPAIR SPINAL MENINGES, OPEN APPROACH: ICD-10-PCS | Performed by: NEUROLOGICAL SURGERY

## 2020-01-20 PROCEDURE — 25010000002 DEXAMETHASONE PER 1 MG: Performed by: NURSE ANESTHETIST, CERTIFIED REGISTERED

## 2020-01-20 PROCEDURE — 22853 INSJ BIOMECHANICAL DEVICE: CPT | Performed by: NEUROLOGICAL SURGERY

## 2020-01-20 PROCEDURE — 25010000002 ONDANSETRON PER 1 MG: Performed by: NURSE ANESTHETIST, CERTIFIED REGISTERED

## 2020-01-20 PROCEDURE — 25010000002 HYDROMORPHONE PER 4 MG: Performed by: NURSE ANESTHETIST, CERTIFIED REGISTERED

## 2020-01-20 PROCEDURE — 25010000002 PHENYLEPHRINE PER 1 ML: Performed by: NURSE ANESTHETIST, CERTIFIED REGISTERED

## 2020-01-20 PROCEDURE — 22630 ARTHRD PST TQ 1NTRSPC LUM: CPT | Performed by: NEUROLOGICAL SURGERY

## 2020-01-20 PROCEDURE — 25010000002 VANCOMYCIN 1 G RECONSTITUTED SOLUTION: Performed by: NEUROLOGICAL SURGERY

## 2020-01-20 PROCEDURE — 76000 FLUOROSCOPY <1 HR PHYS/QHP: CPT

## 2020-01-20 PROCEDURE — 22842 INSERT SPINE FIXATION DEVICE: CPT | Performed by: NEUROLOGICAL SURGERY

## 2020-01-20 PROCEDURE — 0SG10AJ FUSION OF 2 OR MORE LUMBAR VERTEBRAL JOINTS WITH INTERBODY FUSION DEVICE, POSTERIOR APPROACH, ANTERIOR COLUMN, OPEN APPROACH: ICD-10-PCS | Performed by: NEUROLOGICAL SURGERY

## 2020-01-20 DEVICE — SCREW 7220850 CDH PEEK EXTERNAL HEX
Type: IMPLANTABLE DEVICE | Site: SPINE LUMBAR | Status: FUNCTIONAL
Brand: CD HORIZON® SPINAL SYSTEM

## 2020-01-20 DEVICE — DURAGEN® PLUS DURAL REGENERATION MATRIX, 2 IN X 2 IN (5 CM X 5 CM)
Type: IMPLANTABLE DEVICE | Site: SPINE LUMBAR | Status: FUNCTIONAL
Brand: DURAGEN® PLUS

## 2020-01-20 DEVICE — DBM T44145 5CC ORTHOBLEND SMALL DEFGRAFT
Type: IMPLANTABLE DEVICE | Site: SPINE LUMBAR | Status: FUNCTIONAL
Brand: GRAFTON®AND GRAFTON PLUS®DEMINERALIZED BONE MATRIX (DBM)

## 2020-01-20 DEVICE — SPACER 2991022 CAPSTONE PEEK 10X22
Type: IMPLANTABLE DEVICE | Site: SPINE LUMBAR | Status: FUNCTIONAL
Brand: CAPSTONE® SPINAL SYSTEM

## 2020-01-20 DEVICE — SPACER 2990822 CAPSTONE PEEK 08X22
Type: IMPLANTABLE DEVICE | Site: SPINE LUMBAR | Status: FUNCTIONAL
Brand: CAPSTONE® SPINAL SYSTEM

## 2020-01-20 RX ORDER — ASPIRIN 81 MG/1
81 TABLET ORAL DAILY
Status: DISCONTINUED | OUTPATIENT
Start: 2020-01-20 | End: 2020-01-25 | Stop reason: HOSPADM

## 2020-01-20 RX ORDER — NALOXONE HCL 0.4 MG/ML
0.4 VIAL (ML) INJECTION
Status: DISCONTINUED | OUTPATIENT
Start: 2020-01-20 | End: 2020-01-25 | Stop reason: HOSPADM

## 2020-01-20 RX ORDER — FAMOTIDINE 20 MG/1
20 TABLET, FILM COATED ORAL ONCE
Status: COMPLETED | OUTPATIENT
Start: 2020-01-20 | End: 2020-01-20

## 2020-01-20 RX ORDER — IBUPROFEN 800 MG/1
800 TABLET ORAL ONCE
Status: COMPLETED | OUTPATIENT
Start: 2020-01-20 | End: 2020-01-20

## 2020-01-20 RX ORDER — PROMETHAZINE HYDROCHLORIDE 25 MG/1
25 SUPPOSITORY RECTAL ONCE AS NEEDED
Status: DISCONTINUED | OUTPATIENT
Start: 2020-01-20 | End: 2020-01-20 | Stop reason: HOSPADM

## 2020-01-20 RX ORDER — DOCUSATE SODIUM 100 MG/1
100 CAPSULE, LIQUID FILLED ORAL 2 TIMES DAILY PRN
Status: DISCONTINUED | OUTPATIENT
Start: 2020-01-20 | End: 2020-01-25 | Stop reason: HOSPADM

## 2020-01-20 RX ORDER — BUPIVACAINE HYDROCHLORIDE AND EPINEPHRINE 2.5; 5 MG/ML; UG/ML
INJECTION, SOLUTION EPIDURAL; INFILTRATION; INTRACAUDAL; PERINEURAL AS NEEDED
Status: DISCONTINUED | OUTPATIENT
Start: 2020-01-20 | End: 2020-01-20 | Stop reason: HOSPADM

## 2020-01-20 RX ORDER — ACETAMINOPHEN 325 MG/1
650 TABLET ORAL 3 TIMES DAILY
Status: DISCONTINUED | OUTPATIENT
Start: 2020-01-20 | End: 2020-01-25 | Stop reason: HOSPADM

## 2020-01-20 RX ORDER — NEOSTIGMINE METHYLSULFATE 1 MG/ML
INJECTION, SOLUTION INTRAVENOUS AS NEEDED
Status: DISCONTINUED | OUTPATIENT
Start: 2020-01-20 | End: 2020-01-20 | Stop reason: SURG

## 2020-01-20 RX ORDER — MORPHINE SULFATE 1 MG/ML
2 INJECTION, SOLUTION EPIDURAL; INTRATHECAL; INTRAVENOUS EVERY 4 HOURS PRN
Status: DISCONTINUED | OUTPATIENT
Start: 2020-01-20 | End: 2020-01-24

## 2020-01-20 RX ORDER — LIDOCAINE HYDROCHLORIDE 10 MG/ML
0.5 INJECTION, SOLUTION EPIDURAL; INFILTRATION; INTRACAUDAL; PERINEURAL ONCE AS NEEDED
Status: COMPLETED | OUTPATIENT
Start: 2020-01-20 | End: 2020-01-20

## 2020-01-20 RX ORDER — PROMETHAZINE HYDROCHLORIDE 25 MG/1
25 TABLET ORAL ONCE AS NEEDED
Status: DISCONTINUED | OUTPATIENT
Start: 2020-01-20 | End: 2020-01-20 | Stop reason: HOSPADM

## 2020-01-20 RX ORDER — CEFAZOLIN SODIUM 2 G/100ML
2 INJECTION, SOLUTION INTRAVENOUS ONCE
Status: COMPLETED | OUTPATIENT
Start: 2020-01-20 | End: 2020-01-20

## 2020-01-20 RX ORDER — FENTANYL CITRATE 50 UG/ML
INJECTION, SOLUTION INTRAMUSCULAR; INTRAVENOUS AS NEEDED
Status: DISCONTINUED | OUTPATIENT
Start: 2020-01-20 | End: 2020-01-20 | Stop reason: SURG

## 2020-01-20 RX ORDER — GABAPENTIN 300 MG/1
300 CAPSULE ORAL 3 TIMES DAILY PRN
Status: DISCONTINUED | OUTPATIENT
Start: 2020-01-20 | End: 2020-01-25 | Stop reason: HOSPADM

## 2020-01-20 RX ORDER — SODIUM CHLORIDE 0.9 % (FLUSH) 0.9 %
3 SYRINGE (ML) INJECTION EVERY 12 HOURS SCHEDULED
Status: DISCONTINUED | OUTPATIENT
Start: 2020-01-20 | End: 2020-01-25 | Stop reason: HOSPADM

## 2020-01-20 RX ORDER — PROMETHAZINE HYDROCHLORIDE 25 MG/ML
12.5 INJECTION, SOLUTION INTRAMUSCULAR; INTRAVENOUS EVERY 6 HOURS PRN
Status: DISCONTINUED | OUTPATIENT
Start: 2020-01-20 | End: 2020-01-21

## 2020-01-20 RX ORDER — LIDOCAINE HYDROCHLORIDE 10 MG/ML
INJECTION, SOLUTION INFILTRATION; PERINEURAL AS NEEDED
Status: DISCONTINUED | OUTPATIENT
Start: 2020-01-20 | End: 2020-01-20 | Stop reason: SURG

## 2020-01-20 RX ORDER — PROPOFOL 10 MG/ML
VIAL (ML) INTRAVENOUS AS NEEDED
Status: DISCONTINUED | OUTPATIENT
Start: 2020-01-20 | End: 2020-01-20 | Stop reason: SURG

## 2020-01-20 RX ORDER — GLYCOPYRROLATE 0.2 MG/ML
INJECTION INTRAMUSCULAR; INTRAVENOUS AS NEEDED
Status: DISCONTINUED | OUTPATIENT
Start: 2020-01-20 | End: 2020-01-20 | Stop reason: SURG

## 2020-01-20 RX ORDER — ONDANSETRON 2 MG/ML
INJECTION INTRAMUSCULAR; INTRAVENOUS AS NEEDED
Status: DISCONTINUED | OUTPATIENT
Start: 2020-01-20 | End: 2020-01-20 | Stop reason: SURG

## 2020-01-20 RX ORDER — SODIUM CHLORIDE 0.9 % (FLUSH) 0.9 %
10 SYRINGE (ML) INJECTION AS NEEDED
Status: DISCONTINUED | OUTPATIENT
Start: 2020-01-20 | End: 2020-01-25 | Stop reason: HOSPADM

## 2020-01-20 RX ORDER — PROMETHAZINE HYDROCHLORIDE 25 MG/ML
6.25 INJECTION, SOLUTION INTRAMUSCULAR; INTRAVENOUS ONCE AS NEEDED
Status: DISCONTINUED | OUTPATIENT
Start: 2020-01-20 | End: 2020-01-20 | Stop reason: HOSPADM

## 2020-01-20 RX ORDER — FAMOTIDINE 10 MG/ML
20 INJECTION, SOLUTION INTRAVENOUS ONCE
Status: DISCONTINUED | OUTPATIENT
Start: 2020-01-20 | End: 2020-01-20 | Stop reason: HOSPADM

## 2020-01-20 RX ORDER — DEXAMETHASONE SODIUM PHOSPHATE 4 MG/ML
INJECTION, SOLUTION INTRA-ARTICULAR; INTRALESIONAL; INTRAMUSCULAR; INTRAVENOUS; SOFT TISSUE AS NEEDED
Status: DISCONTINUED | OUTPATIENT
Start: 2020-01-20 | End: 2020-01-20 | Stop reason: SURG

## 2020-01-20 RX ORDER — SODIUM CHLORIDE 9 MG/ML
INJECTION, SOLUTION INTRAVENOUS AS NEEDED
Status: DISCONTINUED | OUTPATIENT
Start: 2020-01-20 | End: 2020-01-20 | Stop reason: HOSPADM

## 2020-01-20 RX ORDER — PROMETHAZINE HYDROCHLORIDE 12.5 MG/1
12.5 TABLET ORAL EVERY 6 HOURS PRN
Status: DISCONTINUED | OUTPATIENT
Start: 2020-01-20 | End: 2020-01-21

## 2020-01-20 RX ORDER — MECLIZINE HYDROCHLORIDE 25 MG/1
25 TABLET ORAL 3 TIMES DAILY PRN
Status: DISCONTINUED | OUTPATIENT
Start: 2020-01-20 | End: 2020-01-25 | Stop reason: HOSPADM

## 2020-01-20 RX ORDER — TRAMADOL HYDROCHLORIDE 50 MG/1
50 TABLET ORAL EVERY 6 HOURS PRN
Status: DISCONTINUED | OUTPATIENT
Start: 2020-01-20 | End: 2020-01-25 | Stop reason: HOSPADM

## 2020-01-20 RX ORDER — BISACODYL 10 MG
10 SUPPOSITORY, RECTAL RECTAL DAILY PRN
Status: DISCONTINUED | OUTPATIENT
Start: 2020-01-20 | End: 2020-01-25 | Stop reason: HOSPADM

## 2020-01-20 RX ORDER — ONDANSETRON 2 MG/ML
4 INJECTION INTRAMUSCULAR; INTRAVENOUS EVERY 6 HOURS PRN
Status: DISCONTINUED | OUTPATIENT
Start: 2020-01-20 | End: 2020-01-25 | Stop reason: HOSPADM

## 2020-01-20 RX ORDER — CEFAZOLIN SODIUM 2 G/100ML
2 INJECTION, SOLUTION INTRAVENOUS EVERY 8 HOURS
Status: COMPLETED | OUTPATIENT
Start: 2020-01-20 | End: 2020-01-21

## 2020-01-20 RX ORDER — SODIUM CHLORIDE 0.9 % (FLUSH) 0.9 %
10 SYRINGE (ML) INJECTION AS NEEDED
Status: DISCONTINUED | OUTPATIENT
Start: 2020-01-20 | End: 2020-01-20 | Stop reason: HOSPADM

## 2020-01-20 RX ORDER — HYDROMORPHONE HYDROCHLORIDE 1 MG/ML
0.5 INJECTION, SOLUTION INTRAMUSCULAR; INTRAVENOUS; SUBCUTANEOUS
Status: DISCONTINUED | OUTPATIENT
Start: 2020-01-20 | End: 2020-01-20 | Stop reason: HOSPADM

## 2020-01-20 RX ORDER — SODIUM CHLORIDE 0.9 % (FLUSH) 0.9 %
10 SYRINGE (ML) INJECTION EVERY 12 HOURS SCHEDULED
Status: DISCONTINUED | OUTPATIENT
Start: 2020-01-20 | End: 2020-01-20 | Stop reason: HOSPADM

## 2020-01-20 RX ORDER — ACETAMINOPHEN 500 MG
1000 TABLET ORAL ONCE
Status: COMPLETED | OUTPATIENT
Start: 2020-01-20 | End: 2020-01-20

## 2020-01-20 RX ORDER — SERTRALINE HYDROCHLORIDE 25 MG/1
25 TABLET, FILM COATED ORAL DAILY
Status: DISCONTINUED | OUTPATIENT
Start: 2020-01-20 | End: 2020-01-25 | Stop reason: HOSPADM

## 2020-01-20 RX ORDER — AMOXICILLIN 250 MG
1 CAPSULE ORAL NIGHTLY PRN
Status: DISCONTINUED | OUTPATIENT
Start: 2020-01-20 | End: 2020-01-25 | Stop reason: HOSPADM

## 2020-01-20 RX ORDER — MORPHINE SULFATE 4 MG/ML
4 INJECTION, SOLUTION INTRAMUSCULAR; INTRAVENOUS EVERY 4 HOURS PRN
Status: DISCONTINUED | OUTPATIENT
Start: 2020-01-20 | End: 2020-01-24

## 2020-01-20 RX ORDER — ATRACURIUM BESYLATE 10 MG/ML
INJECTION, SOLUTION INTRAVENOUS AS NEEDED
Status: DISCONTINUED | OUTPATIENT
Start: 2020-01-20 | End: 2020-01-20 | Stop reason: SURG

## 2020-01-20 RX ORDER — OXYCODONE AND ACETAMINOPHEN 7.5; 325 MG/1; MG/1
1 TABLET ORAL EVERY 4 HOURS PRN
Status: DISCONTINUED | OUTPATIENT
Start: 2020-01-20 | End: 2020-01-25 | Stop reason: HOSPADM

## 2020-01-20 RX ORDER — SODIUM CHLORIDE, SODIUM LACTATE, POTASSIUM CHLORIDE, CALCIUM CHLORIDE 600; 310; 30; 20 MG/100ML; MG/100ML; MG/100ML; MG/100ML
50 INJECTION, SOLUTION INTRAVENOUS CONTINUOUS
Status: DISCONTINUED | OUTPATIENT
Start: 2020-01-20 | End: 2020-01-23

## 2020-01-20 RX ORDER — MAGNESIUM HYDROXIDE 1200 MG/15ML
LIQUID ORAL AS NEEDED
Status: DISCONTINUED | OUTPATIENT
Start: 2020-01-20 | End: 2020-01-20 | Stop reason: HOSPADM

## 2020-01-20 RX ORDER — DIPHENHYDRAMINE HCL 25 MG
25 CAPSULE ORAL NIGHTLY PRN
Status: DISCONTINUED | OUTPATIENT
Start: 2020-01-20 | End: 2020-01-25 | Stop reason: HOSPADM

## 2020-01-20 RX ORDER — PANTOPRAZOLE SODIUM 40 MG/1
40 TABLET, DELAYED RELEASE ORAL
Status: DISCONTINUED | OUTPATIENT
Start: 2020-01-21 | End: 2020-01-25 | Stop reason: HOSPADM

## 2020-01-20 RX ORDER — VANCOMYCIN HYDROCHLORIDE 1 G/20ML
INJECTION, POWDER, LYOPHILIZED, FOR SOLUTION INTRAVENOUS AS NEEDED
Status: DISCONTINUED | OUTPATIENT
Start: 2020-01-20 | End: 2020-01-20 | Stop reason: HOSPADM

## 2020-01-20 RX ORDER — OXYCODONE HCL 10 MG/1
10 TABLET, FILM COATED, EXTENDED RELEASE ORAL ONCE
Status: COMPLETED | OUTPATIENT
Start: 2020-01-20 | End: 2020-01-20

## 2020-01-20 RX ORDER — TIZANIDINE 4 MG/1
2 TABLET ORAL EVERY 8 HOURS SCHEDULED
Status: DISCONTINUED | OUTPATIENT
Start: 2020-01-20 | End: 2020-01-25 | Stop reason: HOSPADM

## 2020-01-20 RX ORDER — SODIUM CHLORIDE, SODIUM LACTATE, POTASSIUM CHLORIDE, CALCIUM CHLORIDE 600; 310; 30; 20 MG/100ML; MG/100ML; MG/100ML; MG/100ML
9 INJECTION, SOLUTION INTRAVENOUS CONTINUOUS
Status: DISCONTINUED | OUTPATIENT
Start: 2020-01-20 | End: 2020-01-23

## 2020-01-20 RX ORDER — ONDANSETRON 4 MG/1
4 TABLET, FILM COATED ORAL EVERY 6 HOURS PRN
Status: DISCONTINUED | OUTPATIENT
Start: 2020-01-20 | End: 2020-01-25 | Stop reason: HOSPADM

## 2020-01-20 RX ORDER — SCOLOPAMINE TRANSDERMAL SYSTEM 1 MG/1
1 PATCH, EXTENDED RELEASE TRANSDERMAL ONCE
Status: COMPLETED | OUTPATIENT
Start: 2020-01-20 | End: 2020-01-23

## 2020-01-20 RX ORDER — PROMETHAZINE HYDROCHLORIDE 12.5 MG/1
12.5 SUPPOSITORY RECTAL EVERY 6 HOURS PRN
Status: DISCONTINUED | OUTPATIENT
Start: 2020-01-20 | End: 2020-01-21

## 2020-01-20 RX ORDER — FENTANYL CITRATE 50 UG/ML
50 INJECTION, SOLUTION INTRAMUSCULAR; INTRAVENOUS
Status: DISCONTINUED | OUTPATIENT
Start: 2020-01-20 | End: 2020-01-20 | Stop reason: HOSPADM

## 2020-01-20 RX ORDER — ONDANSETRON 2 MG/ML
4 INJECTION INTRAMUSCULAR; INTRAVENOUS ONCE AS NEEDED
Status: DISCONTINUED | OUTPATIENT
Start: 2020-01-20 | End: 2020-01-20 | Stop reason: HOSPADM

## 2020-01-20 RX ADMIN — EPHEDRINE SULFATE 10 MG: 50 INJECTION INTRAMUSCULAR; INTRAVENOUS; SUBCUTANEOUS at 13:36

## 2020-01-20 RX ADMIN — EPHEDRINE SULFATE 10 MG: 50 INJECTION INTRAMUSCULAR; INTRAVENOUS; SUBCUTANEOUS at 13:51

## 2020-01-20 RX ADMIN — IBUPROFEN 800 MG: 800 TABLET, FILM COATED ORAL at 10:40

## 2020-01-20 RX ADMIN — PHENYLEPHRINE HYDROCHLORIDE 0.1 MCG/KG/MIN: 10 INJECTION INTRAVENOUS at 14:12

## 2020-01-20 RX ADMIN — PROPOFOL 35 MCG/KG/MIN: 10 INJECTION, EMULSION INTRAVENOUS at 15:26

## 2020-01-20 RX ADMIN — FAMOTIDINE 20 MG: 20 TABLET ORAL at 10:40

## 2020-01-20 RX ADMIN — CEFAZOLIN SODIUM 2 G: 2 INJECTION, SOLUTION INTRAVENOUS at 22:33

## 2020-01-20 RX ADMIN — ONDANSETRON 4 MG: 2 INJECTION INTRAMUSCULAR; INTRAVENOUS at 17:10

## 2020-01-20 RX ADMIN — SCOPALAMINE 1 PATCH: 1 PATCH, EXTENDED RELEASE TRANSDERMAL at 11:18

## 2020-01-20 RX ADMIN — SODIUM CHLORIDE, POTASSIUM CHLORIDE, SODIUM LACTATE AND CALCIUM CHLORIDE: 600; 310; 30; 20 INJECTION, SOLUTION INTRAVENOUS at 16:05

## 2020-01-20 RX ADMIN — ACETAMINOPHEN 1000 MG: 500 TABLET ORAL at 10:40

## 2020-01-20 RX ADMIN — TIZANIDINE 2 MG: 4 TABLET ORAL at 23:15

## 2020-01-20 RX ADMIN — EPHEDRINE SULFATE 10 MG: 50 INJECTION INTRAMUSCULAR; INTRAVENOUS; SUBCUTANEOUS at 13:57

## 2020-01-20 RX ADMIN — DEXAMETHASONE SODIUM PHOSPHATE 8 MG: 4 INJECTION, SOLUTION INTRAMUSCULAR; INTRAVENOUS at 13:21

## 2020-01-20 RX ADMIN — HYDROMORPHONE HYDROCHLORIDE 0.5 MG: 1 INJECTION, SOLUTION INTRAMUSCULAR; INTRAVENOUS; SUBCUTANEOUS at 18:37

## 2020-01-20 RX ADMIN — OXYCODONE HYDROCHLORIDE AND ACETAMINOPHEN 1 TABLET: 7.5; 325 TABLET ORAL at 23:14

## 2020-01-20 RX ADMIN — EPHEDRINE SULFATE 10 MG: 50 INJECTION INTRAMUSCULAR; INTRAVENOUS; SUBCUTANEOUS at 13:20

## 2020-01-20 RX ADMIN — ATRACURIUM BESYLATE 40 MG: 10 INJECTION, SOLUTION INTRAVENOUS at 13:07

## 2020-01-20 RX ADMIN — EPHEDRINE SULFATE 10 MG: 50 INJECTION INTRAMUSCULAR; INTRAVENOUS; SUBCUTANEOUS at 13:22

## 2020-01-20 RX ADMIN — GLYCOPYRROLATE 0.4 MG: 0.2 INJECTION, SOLUTION INTRAMUSCULAR; INTRAVENOUS at 17:10

## 2020-01-20 RX ADMIN — ACETAMINOPHEN 650 MG: 325 TABLET ORAL at 23:15

## 2020-01-20 RX ADMIN — PROPOFOL 130 MG: 10 INJECTION, EMULSION INTRAVENOUS at 13:07

## 2020-01-20 RX ADMIN — LIDOCAINE HYDROCHLORIDE 50 MG: 10 INJECTION, SOLUTION INFILTRATION; PERINEURAL at 13:07

## 2020-01-20 RX ADMIN — CEFAZOLIN SODIUM 2 G: 2 INJECTION, SOLUTION INTRAVENOUS at 13:04

## 2020-01-20 RX ADMIN — OXYCODONE HYDROCHLORIDE 10 MG: 10 TABLET, FILM COATED, EXTENDED RELEASE ORAL at 10:40

## 2020-01-20 RX ADMIN — LIDOCAINE HYDROCHLORIDE 0.5 ML: 10 INJECTION, SOLUTION EPIDURAL; INFILTRATION; INTRACAUDAL; PERINEURAL at 10:42

## 2020-01-20 RX ADMIN — NEOSTIGMINE METHYLSULFATE 3 MG: 1 INJECTION, SOLUTION INTRAVENOUS at 17:10

## 2020-01-20 RX ADMIN — PROPOFOL 25 MCG/KG/MIN: 10 INJECTION, EMULSION INTRAVENOUS at 13:25

## 2020-01-20 RX ADMIN — ASPIRIN 81 MG: 81 TABLET, COATED ORAL at 23:14

## 2020-01-20 RX ADMIN — FENTANYL CITRATE 50 MCG: 50 INJECTION, SOLUTION INTRAMUSCULAR; INTRAVENOUS at 13:07

## 2020-01-20 RX ADMIN — SERTRALINE HYDROCHLORIDE 25 MG: 25 TABLET ORAL at 23:15

## 2020-01-20 RX ADMIN — SODIUM CHLORIDE, POTASSIUM CHLORIDE, SODIUM LACTATE AND CALCIUM CHLORIDE 9 ML/HR: 600; 310; 30; 20 INJECTION, SOLUTION INTRAVENOUS at 10:42

## 2020-01-20 NOTE — ANESTHESIA POSTPROCEDURE EVALUATION
Patient: Casandra Trinidad    Procedure Summary     Date:  01/20/20 Room / Location:   [facility]    Anesthesia Start:  1304 Anesthesia Stop:  1732    Procedure:  LUMBAR FUSION L3-5 FORAMINOTOMY 1-3 (N/A Spine Lumbar) Diagnosis:       Lumbar stenosis with neurogenic claudication      (Lumbar stenosis with neurogenic claudication [M48.062])    Surgeon:  Braulio Bird MD Provider:  Joni Delgadillo MD    Anesthesia Type:  general ASA Status:  2          Anesthesia Type: general    Vitals  Vitals Value Taken Time   BP     Temp     Pulse     Resp     SpO2 91 % 1/20/2020  5:31 PM   Vitals shown include unvalidated device data.        Post Anesthesia Care and Evaluation    Patient location during evaluation: PACU  Patient participation: complete - patient participated  Level of consciousness: sleepy but conscious  Pain score: 0  Pain management: adequate  Airway patency: patent  Anesthetic complications: No anesthetic complications  PONV Status: none  Cardiovascular status: hemodynamically stable and acceptable  Respiratory status: nonlabored ventilation, acceptable, nasal cannula and oral airway  Hydration status: acceptable

## 2020-01-20 NOTE — ANESTHESIA PREPROCEDURE EVALUATION
Anesthesia Evaluation     history of anesthetic complications: PONV               Airway   Mallampati: I  TM distance: >3 FB  Neck ROM: full  Dental      Pulmonary    (+) sleep apnea,   Cardiovascular     ECG reviewed        Neuro/Psych  (+) dizziness/light headedness, psychiatric history Anxiety and Depression,     GI/Hepatic/Renal/Endo    (+) obesity,  GERD,      Musculoskeletal     Abdominal    Substance History      OB/GYN          Other                        Anesthesia Plan    ASA 2     general     intravenous induction     Anesthetic plan, all risks, benefits, and alternatives have been provided, discussed and informed consent has been obtained with: patient.    Plan discussed with CRNA.

## 2020-01-20 NOTE — OP NOTE
NEUROSURGICAL OPERATIVE NOTE        PREOPERATIVE DIAGNOSIS:    Lumbar spondylolisthesis with stenosis and instability.      POSTOPERATIVE DIAGNOSIS:  Same      PROCEDURE:  1.  Arthrodesis interbody type L3-4 and L4-5  2.  L3-4 and L4-5 laminectomies with partial facetectomies and foraminotomies  3.  Bilateral L3-4 and L4-5 discectomies with bilateral CAPSTONE cage placement at each level  4.  Segmental pedicle screw fixation L3-5 using PEEK rods  5.  Gregg utilized with local autograft  6.  Stealth frameless stereotaxy utilized in conjunction with O arm imaging      SURGEON:  Braulio Bird M.D.      ASSISTANT: Jaqui Arango PA-C      ANESTHESIA:  General      ESTIMATED BLOOD LOSS: 100 mL      SPECIMEN: None      DRAINS: Hemovac      COMPLICATIONS:  None      CLINICAL NOTE:  The patient is a 69-year-old woman with progressive back and lower extremity pain with walking and standing intolerance.  She also has significant sensory alteration.  Studies demonstrate spondylolisthesis at L3-4 and L4-5 with evidence of instability.  There is high-grade stenosis.  As such she presented at this time for lumbar decompression, fusion, stabilization.  The nature of the procedure as well as the potential risks, complications, limitations, and alternatives to the procedure were discussed at length with the patient and the patient has agreed to proceed with surgery.      TECHNICAL NOTE:  The patient was brought to the operating room and while on her cart, general endotracheal anesthesia was achieved.  Her low back was prepared and draped in the usual fashion and a localizing radiograph was obtained with the spinal needle in the lumbosacral midline utilizing the C-arm.  Based on this, a several centimeter vertical incision was fashioned.  I exposed from L1-L2 down to L4-L5.  Cautery was utilized to expose the posterior spinal elements.  Reference frame was affixed to a spinous process and O-arm imaging ensued.  These images  were downloaded into the stealth station.  Each of the pedicle hole sites at L3, L4 and L5 bilaterally were marked, drilled and tapped.  The 6.5 mm diameter screws were used throughout; these were 50 mm length bilaterally at L3-L4 and 45 mm length bilaterally at L5.  I was going to pursue foraminotomies at L1-L2 and L2-L3 on each side; however, there appeared to be a slight bit of retrolisthesis and angulation at the disc space at each site that was not visible on the MRI.  In addition, the facet complexes were oriented very much anterior to posterior and I was concerned about potential for subsequent instability, so I elected not to pursue the planned foraminotomies.  Leksell rongeur was utilized to remove the spinous process at L3-L4 and the upper aspect of L5.  A central trough was fashioned and widened using the drill and Kerrison punches.  Good decompression of the nerve roots and thecal sac was accomplished.  The C-arm was brought into use and beginning on the right, the disc at L4-L5 was incised and evacuated piecemeal with an array of rongeurs, curettes and punches.  Serial impacters were impacted into the disc space.  Ultimately, a 10 x 22 mm capstone cage packed with a combination of Zack and local autograft was impacted into the disc space using fluoroscopic guidance.  The procedure was repeated at the L3-L4 level; however, an 8 x 22 mm capstone cage was utilized.  I then worked on the left side.  The disc space was similarly prepared prior to placing the second 10 x 22 mm cage at the L4-L5 level.  The fusion substraight was placed anteriorly and then in the midline.  The second cage was impacted into place.  This resulted in a small dural rent in the axilla of the L4 nerve root.  The nerve was not exposed, but there was some modest CSF oozing.  I put a couple of 6-0 Prolene sutures in; that seemed to help.  At the end of the procedure, this area was tamponaded and a sling was made under the axilla  with nonsuturable Duragen.  Attention was then turned to the L3-L4 level on the left where the disc space was prepared and a second 8 x 22 mm capstone cage was placed.  The 60 mm peak rods were applied to these screw heads on each side.  Set screws were tightened and broken off per routine.  The wound was washed out with an antibiotic containing solution and the Duragen noted above was placed.  Some vancomycin powder was sprinkled in the depths and the muscle and fascia were then reapproximated in interrupted fashion with 0 Vicryl suture.  Prior to that, I placed a drain in the epidural space.  This was a Hemovac drain.  Then, 0.25% Marcaine was instilled into the paraspinous musculature and subcutaneous tissue.  Subcutaneous tissues were closed in multiple layers with 2-0 followed by 3-0 Vicryl suture.  The skin was closed in a running locked fashion with a 3-0 Nylon suture.  Sterile dressing was applied.  She was rolled onto her cart.  She was taken to the recovery room in satisfactory condition.            Braulio Bird M.D.

## 2020-01-20 NOTE — ANESTHESIA PROCEDURE NOTES
Airway  Urgency: elective    Date/Time: 1/20/2020 1:09 PM  Airway not difficult    General Information and Staff    Patient location during procedure: OR  CRNA: Yazmin Sanders CRNA    Indications and Patient Condition  Indications for airway management: airway protection    Preoxygenated: yes  MILS not maintained throughout  Mask difficulty assessment: 1 - vent by mask    Final Airway Details  Final airway type: endotracheal airway      Successful airway: ETT  Cuffed: yes   Successful intubation technique: direct laryngoscopy  Facilitating devices/methods: intubating stylet  Endotracheal tube insertion site: oral  Blade: Ulysses  Blade size: 3  ETT size (mm): 7.0  Cormack-Lehane Classification: grade I - full view of glottis  Placement verified by: chest auscultation and capnometry   Measured from: lips  ETT/EBT  to lips (cm): 20  Number of attempts at approach: 1  Assessment: lips, teeth, and gum same as pre-op and atraumatic intubation    Additional Comments  Negative epigastric sounds, Breath sound equal bilaterally with symmetric chest rise and fall

## 2020-01-20 NOTE — INTERVAL H&P NOTE
Pre-Op H&P (See Recent Office Note Attached for Full H&P)    Chief complaint: Low back pain, Bilateral lower extremity pain with sensory alteration, L>R    Review of Systems:  General ROS:  no fever, chills, rashes, No change since last office visit  Cardiovascular ROS: no chest pain or dyspnea on exertion.  Neg activity intolerance, but unable to walk much d/t back/legs  Respiratory ROS: no cough, shortness of breath, or wheezing    Meds:    No current facility-administered medications on file prior to encounter.      Current Outpatient Medications on File Prior to Encounter   Medication Sig Dispense Refill   • Calcium Carbonate-Vitamin D (CALCIUM 600+D) 600-200 MG-UNIT tablet Take 1 tablet by mouth 2 (two) times a day.     • CBD (cannabidiol) oral oil Take  by mouth Daily.     • Dietary Management Product (RHEUMATE) capsule Take 1 capsule twice daily (Patient taking differently: Take 1 capsule by mouth Daily. Take 1 capsule twice daily) 180 capsule 1   • gabapentin (NEURONTIN) 300 MG capsule Take 1 capsule by mouth 3 (Three) Times a Day As Needed (neuropathic pain). 270 capsule 1   • lidocaine (LIDODERM) 5 % Place 3 patches on the skin as directed by provider Daily. Remove & Discard patch within 12 hours or as directed by  patch 3   • meclizine (ANTIVERT) 25 MG tablet Take 1 tablet by mouth 3 (Three) Times a Day As Needed for dizziness. 90 tablet 3   • meloxicam (MOBIC) 15 MG tablet Take 1 tablet by mouth Daily. 90 tablet 3   • Multiple Vitamins-Minerals (ICAPS AREDS 2 PO) Take 1 capsule by mouth every night.     • omeprazole (priLOSEC) 20 MG capsule Take 1 capsule by mouth Daily. 90 capsule 3   • sertraline (ZOLOFT) 25 MG tablet Take 1 tablet by mouth Daily. 90 tablet 3   • tiZANidine (ZANAFLEX) 2 MG tablet Take 1 tablet by mouth 3 (Three) Times a Day. 270 tablet 1   • traMADol (ULTRAM) 50 MG tablet Take 1 tablet by mouth Every 6 (Six) Hours As Needed for Moderate Pain . 360 tablet 1   • aspirin 81 MG EC  tablet Take 81 mg by mouth daily.         Vital Signs:  /70 (BP Location: Right arm, Patient Position: Lying)   Pulse 67   Temp 97.3 °F (36.3 °C) (Temporal)   Resp 18   LMP  (LMP Unknown)   SpO2 96%     Physical Exam:    CV:  S1S2 regular rate and rhythm, no murmur               Resp:  Clear to auscultation; respirations regular, even and unlabored    Results Review:     Lab Results   Component Value Date    WBC 6.12 01/12/2020    HGB 13.3 01/12/2020    HCT 41.1 01/12/2020    MCV 88.2 01/12/2020     01/12/2020    NEUTROABS 3.70 12/02/2019    GLUCOSE 91 11/25/2019    BUN 11 11/25/2019    CREATININE 0.71 11/25/2019    EGFRIFNONA 82 11/25/2019     11/25/2019    K 4.7 11/25/2019     11/25/2019    CO2 28.5 11/25/2019    MG 1.9 02/07/2019    CALCIUM 9.9 11/25/2019    ALBUMIN 4.20 11/25/2019    AST 27 11/25/2019    ALT 21 11/25/2019    BILITOT 0.3 11/25/2019        I reviewed the patient's new clinical results.    Cancer Staging (if applicable)  Cancer Patient: __ yes _x_no __unknown; If yes, clinical stage T:__ N:__M:__, stage group or __N/A    Assessment/Plan:    Diagnosis:   1.  Lumbar stenosis with neurogenic claudication.  2.  Lumbar spondylolisthesis with instability.  3.  Mechanical low back pain.     Treatment Options:   I have further reviewed her prior thoracic and the more recent lumbar MRI.  I think it would be feasible to place a surgical lead to the superior T9 level.  This would simply involve a bit more bone removal than usual.  Previously I was concerned about that but I think it is doable.  However, spinal cord stimulator placement is not going to help with her progressive weakness and numbness which is concerning.  As such, I have recommended lumbar decompression and fusion at L3-5 with at least bilateral generous foraminotomies at L1-2 and L2-3.  The goal of surgery would be to address her claudication symptoms and to prevent further neurologic compromise.  It is possible  that she could develop additional instability and require additional surgery.  If she continues to have substantial pain this would not burn bridges in terms of future spinal cord stimulator placement.  The nature of the procedure as well as the potential risks, complications, limitations, and alternatives to the procedure were discussed at length with the patient and the patient has agreed to proceed with surgery.  Of note, many narcotics make her nauseous.  She has tolerated Dilaudid in the past.    Gina Mclaughlin, APRN  1/20/2020   10:41 AM

## 2020-01-21 LAB
HCT VFR BLD AUTO: 33 % (ref 34–46.6)
HGB BLD-MCNC: 10.6 G/DL (ref 12–15.9)

## 2020-01-21 PROCEDURE — 63710000001 DIPHENHYDRAMINE PER 50 MG: Performed by: NEUROLOGICAL SURGERY

## 2020-01-21 PROCEDURE — 85014 HEMATOCRIT: CPT | Performed by: NEUROLOGICAL SURGERY

## 2020-01-21 PROCEDURE — 25010000003 CEFAZOLIN IN DEXTROSE 2-4 GM/100ML-% SOLUTION: Performed by: NEUROLOGICAL SURGERY

## 2020-01-21 PROCEDURE — 25010000002 ONDANSETRON PER 1 MG: Performed by: NEUROLOGICAL SURGERY

## 2020-01-21 PROCEDURE — 25010000002 DIAZEPAM PER 5 MG: Performed by: PHYSICIAN ASSISTANT

## 2020-01-21 PROCEDURE — 25010000002 PROMETHAZINE PER 50 MG: Performed by: PHYSICIAN ASSISTANT

## 2020-01-21 PROCEDURE — 25010000002 HEPARIN (PORCINE) PER 1000 UNITS: Performed by: NEUROLOGICAL SURGERY

## 2020-01-21 PROCEDURE — 99024 POSTOP FOLLOW-UP VISIT: CPT | Performed by: NEUROLOGICAL SURGERY

## 2020-01-21 PROCEDURE — 85018 HEMOGLOBIN: CPT | Performed by: NEUROLOGICAL SURGERY

## 2020-01-21 RX ORDER — HEPARIN SODIUM 5000 [USP'U]/ML
5000 INJECTION, SOLUTION INTRAVENOUS; SUBCUTANEOUS EVERY 8 HOURS SCHEDULED
Status: DISCONTINUED | OUTPATIENT
Start: 2020-01-21 | End: 2020-01-25 | Stop reason: HOSPADM

## 2020-01-21 RX ORDER — ALPRAZOLAM 1 MG/1
1 TABLET ORAL EVERY 4 HOURS PRN
Status: DISCONTINUED | OUTPATIENT
Start: 2020-01-21 | End: 2020-01-25 | Stop reason: HOSPADM

## 2020-01-21 RX ORDER — PROMETHAZINE HYDROCHLORIDE 25 MG/ML
12.5 INJECTION, SOLUTION INTRAMUSCULAR; INTRAVENOUS EVERY 4 HOURS PRN
Status: DISCONTINUED | OUTPATIENT
Start: 2020-01-21 | End: 2020-01-25 | Stop reason: HOSPADM

## 2020-01-21 RX ORDER — DIAZEPAM 5 MG/ML
5 INJECTION, SOLUTION INTRAMUSCULAR; INTRAVENOUS EVERY 4 HOURS PRN
Status: DISCONTINUED | OUTPATIENT
Start: 2020-01-21 | End: 2020-01-25 | Stop reason: HOSPADM

## 2020-01-21 RX ORDER — PROMETHAZINE HYDROCHLORIDE 12.5 MG/1
12.5 TABLET ORAL EVERY 6 HOURS PRN
Status: DISCONTINUED | OUTPATIENT
Start: 2020-01-21 | End: 2020-01-25 | Stop reason: HOSPADM

## 2020-01-21 RX ORDER — PROMETHAZINE HYDROCHLORIDE 12.5 MG/1
12.5 SUPPOSITORY RECTAL EVERY 6 HOURS PRN
Status: DISCONTINUED | OUTPATIENT
Start: 2020-01-21 | End: 2020-01-25 | Stop reason: HOSPADM

## 2020-01-21 RX ADMIN — DIAZEPAM 5 MG: 5 INJECTION, SOLUTION INTRAMUSCULAR; INTRAVENOUS at 23:09

## 2020-01-21 RX ADMIN — DIPHENHYDRAMINE HYDROCHLORIDE 25 MG: 25 CAPSULE ORAL at 21:35

## 2020-01-21 RX ADMIN — ONDANSETRON 4 MG: 2 INJECTION INTRAMUSCULAR; INTRAVENOUS at 16:49

## 2020-01-21 RX ADMIN — TIZANIDINE 2 MG: 4 TABLET ORAL at 05:11

## 2020-01-21 RX ADMIN — ACETAMINOPHEN 650 MG: 325 TABLET ORAL at 20:02

## 2020-01-21 RX ADMIN — OXYCODONE HYDROCHLORIDE AND ACETAMINOPHEN 1 TABLET: 7.5; 325 TABLET ORAL at 10:23

## 2020-01-21 RX ADMIN — HEPARIN SODIUM 5000 UNITS: 5000 INJECTION INTRAVENOUS; SUBCUTANEOUS at 08:28

## 2020-01-21 RX ADMIN — OXYCODONE HYDROCHLORIDE AND ACETAMINOPHEN 1 TABLET: 7.5; 325 TABLET ORAL at 05:08

## 2020-01-21 RX ADMIN — PANTOPRAZOLE SODIUM 40 MG: 40 TABLET, DELAYED RELEASE ORAL at 05:11

## 2020-01-21 RX ADMIN — GABAPENTIN 300 MG: 300 CAPSULE ORAL at 21:35

## 2020-01-21 RX ADMIN — TIZANIDINE 4 MG: 4 TABLET ORAL at 20:04

## 2020-01-21 RX ADMIN — HEPARIN SODIUM 5000 UNITS: 5000 INJECTION INTRAVENOUS; SUBCUTANEOUS at 20:06

## 2020-01-21 RX ADMIN — HEPARIN SODIUM 5000 UNITS: 5000 INJECTION INTRAVENOUS; SUBCUTANEOUS at 15:10

## 2020-01-21 RX ADMIN — SERTRALINE HYDROCHLORIDE 25 MG: 25 TABLET ORAL at 08:29

## 2020-01-21 RX ADMIN — ACETAMINOPHEN 650 MG: 325 TABLET ORAL at 16:45

## 2020-01-21 RX ADMIN — PROMETHAZINE HYDROCHLORIDE 12.5 MG: 25 INJECTION INTRAMUSCULAR; INTRAVENOUS at 22:41

## 2020-01-21 RX ADMIN — CEFAZOLIN SODIUM 2 G: 2 INJECTION, SOLUTION INTRAVENOUS at 05:09

## 2020-01-21 RX ADMIN — ACETAMINOPHEN 650 MG: 325 TABLET ORAL at 08:29

## 2020-01-21 RX ADMIN — OXYCODONE HYDROCHLORIDE AND ACETAMINOPHEN 1 TABLET: 7.5; 325 TABLET ORAL at 20:03

## 2020-01-21 RX ADMIN — ASPIRIN 81 MG: 81 TABLET, COATED ORAL at 08:29

## 2020-01-21 RX ADMIN — MECLIZINE 25 MG: 25 TABLET ORAL at 16:54

## 2020-01-21 NOTE — PROGRESS NOTES
NEUROSURGERY PROGRESS NOTE     LOS: 1 day   Patient Care Team:  Tony Melton MD as PCP - General (Family Medicine)  Tony Melton MD as PCP - Claims Attributed  Sachin Schumacher MD PhD as Consulting Physician (Orthopedic Surgery)  Gagandeep Dc MD as Consulting Physician (Pain Medicine)  Olive Zhou APRN as Nurse Practitioner (Nurse Practitioner)  Cira Ortega MD as Consulting Physician (Obstetrics and Gynecology)    Chief Complaint: Back and lower extremity pain with walking and standing intolerance.    POD#: 1 Day Post-Op  Procedures:  L3-5 PLIF.    Interval History:   Patient Complaints: Incisional pain, mild headache, and some burning on the bottom of her left foot.  Patient Denies: Weakness.    Vital Signs: Blood pressure 96/53, pulse 91, temperature 97.6 °F (36.4 °C), temperature source Oral, resp. rate 16, SpO2 96 %, not currently breastfeeding.  Intake/Output:     Intake/Output Summary (Last 24 hours) at 1/21/2020 0607  Last data filed at 1/21/2020 0400  Gross per 24 hour   Intake 1660 ml   Output 1300 ml   Net 360 ml     Drain output: 50/25 mL.    Physical Exam:  The patient is awake and alert.  Dry dressing is in place on her incision.  Motor function is intact in her lower extremities.     Data Review:    Results from last 7 days   Lab Units 01/21/20  0359   HEMOGLOBIN g/dL 10.6*   HEMATOCRIT % 33.0*         Assessment/Plan:  1.  Lumbar spondylolisthesis, stenosis, and instability status post decompression, fusion, stabilization.  2.  Intraoperative dural rent: Continue bedrest.  3.  Vertigo: Not a problem currently.  She does have a history of this particularly with lying flat.  4.  Morbid obesity.  5.  DVT prophylaxis: Mechanical, add subcu heparin.  5.  Disposition: Continue bedrest for at least a couple of days and then mobilize patient.      Braulio Bird MD  01/21/20  6:07 AM

## 2020-01-21 NOTE — PLAN OF CARE
Patient reports intermittent pain at 6-7/10. Family concerned that patient seems really forgetful and confused. PA notified, but family seems to think that it is mainly anesthesia. Mild nausea reported this afternoon relieved with PRN medicine. Voiding adequate amounts via gilliland. Bedrest maintained and log rolled when repositioning.

## 2020-01-21 NOTE — PLAN OF CARE
Problem: Patient Care Overview  Goal: Plan of Care Review  Outcome: Ongoing (interventions implemented as appropriate)  Flowsheets (Taken 1/21/2020 0805)  Progress: improving  Plan of Care Reviewed With: patient; family; daughter  Outcome Summary: received report from PACU. pt groggy but easily awakened. family at bedside.  pt AAO, VSS and in no apparent distress. pt and family in good spirits. gilliland in place, cleansed and draining clear yellow fluid. HOB flat and pt able to roll from side to side. dressing to lumbar spine CDI. scant amt of dried blood to hemovac insertion site. pt slept/rested >5hrs this shift. will continue to monitor

## 2020-01-21 NOTE — PROGRESS NOTES
Discharge Planning Assessment  [facility]     Patient Name: Casandra Trinidad  MRN: [id number]  Today's Date: 1/21/2020    Admit Date: 1/20/2020    Discharge Needs Assessment     Row Name 01/21/20 1326       Transition Planning    Patient/Family Anticipates Transition to  home    Transportation Anticipated  family or friend will provide       Discharge Needs Assessment    Equipment Currently Used at Home  cane, straight;rollator;walker, rolling;shower chair;commode    Row Name 01/21/20 1158       Living Environment    Lives With  child(eliza), adult    Current Living Arrangements  home/apartment/condo    Primary Care Provided by  self    Provides Primary Care For  no one    Family Caregiver if Needed  child(eliza), adult        Discharge Plan     Row Name 01/21/20 1321       Plan    Plan  TBD    Patient/Family in Agreement with Plan  yes    Plan Comments  Spoke w/ patient and family at bedside. Patient lives w/ her daughter, Rhonda, and son in law in a one story w/ basement [place] [facility]. PTA she required assist w/ ADLs and used a RW for mobility assist. Patient is currently on bedrest. Will await therapy eval once bedrest is lifted. Patient is agreeable to rehab or HH if either is indicated per therapy recs. Plan tbd pending medical progress and PT eval. CM following.         Destination      Coordination has not been started for this encounter.      Durable Medical Equipment      Coordination has not been started for this encounter.      Dialysis/Infusion      Coordination has not been started for this encounter.      Home Medical Care      Coordination has not been started for this encounter.      Therapy      Coordination has not been started for this encounter.      Community Resources      Coordination has not been started for this encounter.        Expected Discharge Date and Time     Expected Discharge Date Expected Discharge Time    Jan 24, 2020         Demographic Summary     Row Name 01/21/20 1156        General Information    Arrived From  home    Reason for Consult  discharge planning        Functional Status     Row Name 01/21/20 1157       Functional Status    Usual Activity Tolerance  fair    Current Activity Tolerance  other (see comments) on bedrest        Psychosocial    No documentation.       Abuse/Neglect    No documentation.       Legal    No documentation.       Substance Abuse    No documentation.       Patient Forms    No documentation.           Tenisha Lai RN

## 2020-01-22 LAB
ANION GAP SERPL CALCULATED.3IONS-SCNC: 9 MMOL/L (ref 5–15)
BASOPHILS # BLD AUTO: 0.03 10*3/MM3 (ref 0–0.2)
BASOPHILS NFR BLD AUTO: 0.3 % (ref 0–1.5)
BUN BLD-MCNC: 9 MG/DL (ref 8–23)
BUN/CREAT SERPL: 14.5 (ref 7–25)
CALCIUM SPEC-SCNC: 8.7 MG/DL (ref 8.6–10.5)
CHLORIDE SERPL-SCNC: 106 MMOL/L (ref 98–107)
CO2 SERPL-SCNC: 27 MMOL/L (ref 22–29)
CREAT BLD-MCNC: 0.62 MG/DL (ref 0.57–1)
DEPRECATED RDW RBC AUTO: 45.4 FL (ref 37–54)
EOSINOPHIL # BLD AUTO: 0.23 10*3/MM3 (ref 0–0.4)
EOSINOPHIL NFR BLD AUTO: 1.9 % (ref 0.3–6.2)
ERYTHROCYTE [DISTWIDTH] IN BLOOD BY AUTOMATED COUNT: 13.6 % (ref 12.3–15.4)
GFR SERPL CREATININE-BSD FRML MDRD: 95 ML/MIN/1.73
GLUCOSE BLD-MCNC: 101 MG/DL (ref 65–99)
HCT VFR BLD AUTO: 34.1 % (ref 34–46.6)
HGB BLD-MCNC: 10.7 G/DL (ref 12–15.9)
IMM GRANULOCYTES # BLD AUTO: 0.04 10*3/MM3 (ref 0–0.05)
IMM GRANULOCYTES NFR BLD AUTO: 0.3 % (ref 0–0.5)
LYMPHOCYTES # BLD AUTO: 2.07 10*3/MM3 (ref 0.7–3.1)
LYMPHOCYTES NFR BLD AUTO: 17.4 % (ref 19.6–45.3)
MCH RBC QN AUTO: 28.6 PG (ref 26.6–33)
MCHC RBC AUTO-ENTMCNC: 31.4 G/DL (ref 31.5–35.7)
MCV RBC AUTO: 91.2 FL (ref 79–97)
MONOCYTES # BLD AUTO: 1.08 10*3/MM3 (ref 0.1–0.9)
MONOCYTES NFR BLD AUTO: 9.1 % (ref 5–12)
NEUTROPHILS # BLD AUTO: 8.47 10*3/MM3 (ref 1.7–7)
NEUTROPHILS NFR BLD AUTO: 71 % (ref 42.7–76)
NRBC BLD AUTO-RTO: 0 /100 WBC (ref 0–0.2)
PLATELET # BLD AUTO: 222 10*3/MM3 (ref 140–450)
PMV BLD AUTO: 11.8 FL (ref 6–12)
POTASSIUM BLD-SCNC: 4 MMOL/L (ref 3.5–5.2)
RBC # BLD AUTO: 3.74 10*6/MM3 (ref 3.77–5.28)
SODIUM BLD-SCNC: 142 MMOL/L (ref 136–145)
WBC NRBC COR # BLD: 11.92 10*3/MM3 (ref 3.4–10.8)

## 2020-01-22 PROCEDURE — 80048 BASIC METABOLIC PNL TOTAL CA: CPT | Performed by: NEUROLOGICAL SURGERY

## 2020-01-22 PROCEDURE — 25010000002 HEPARIN (PORCINE) PER 1000 UNITS: Performed by: NEUROLOGICAL SURGERY

## 2020-01-22 PROCEDURE — 25010000002 DIAZEPAM PER 5 MG: Performed by: PHYSICIAN ASSISTANT

## 2020-01-22 PROCEDURE — 99024 POSTOP FOLLOW-UP VISIT: CPT | Performed by: NEUROLOGICAL SURGERY

## 2020-01-22 PROCEDURE — 85025 COMPLETE CBC W/AUTO DIFF WBC: CPT | Performed by: NEUROLOGICAL SURGERY

## 2020-01-22 RX ORDER — HALOPERIDOL 5 MG/ML
2 INJECTION INTRAMUSCULAR ONCE
Status: DISCONTINUED | OUTPATIENT
Start: 2020-01-22 | End: 2020-01-24

## 2020-01-22 RX ADMIN — HEPARIN SODIUM 5000 UNITS: 5000 INJECTION INTRAVENOUS; SUBCUTANEOUS at 08:09

## 2020-01-22 RX ADMIN — SODIUM CHLORIDE, POTASSIUM CHLORIDE, SODIUM LACTATE AND CALCIUM CHLORIDE 50 ML/HR: 600; 310; 30; 20 INJECTION, SOLUTION INTRAVENOUS at 22:53

## 2020-01-22 RX ADMIN — ACETAMINOPHEN 650 MG: 325 TABLET ORAL at 20:39

## 2020-01-22 RX ADMIN — TIZANIDINE 2 MG: 4 TABLET ORAL at 08:09

## 2020-01-22 RX ADMIN — ACETAMINOPHEN 650 MG: 325 TABLET ORAL at 17:08

## 2020-01-22 RX ADMIN — ACETAMINOPHEN 650 MG: 325 TABLET ORAL at 08:01

## 2020-01-22 RX ADMIN — DIAZEPAM 5 MG: 5 INJECTION, SOLUTION INTRAMUSCULAR; INTRAVENOUS at 05:54

## 2020-01-22 RX ADMIN — MECLIZINE 25 MG: 25 TABLET ORAL at 20:40

## 2020-01-22 RX ADMIN — OXYCODONE HYDROCHLORIDE AND ACETAMINOPHEN 1 TABLET: 7.5; 325 TABLET ORAL at 07:49

## 2020-01-22 RX ADMIN — SODIUM CHLORIDE 500 ML: 9 INJECTION, SOLUTION INTRAVENOUS at 22:54

## 2020-01-22 RX ADMIN — HEPARIN SODIUM 5000 UNITS: 5000 INJECTION INTRAVENOUS; SUBCUTANEOUS at 20:40

## 2020-01-22 RX ADMIN — OXYCODONE HYDROCHLORIDE AND ACETAMINOPHEN 1 TABLET: 7.5; 325 TABLET ORAL at 13:46

## 2020-01-22 RX ADMIN — OXYCODONE HYDROCHLORIDE AND ACETAMINOPHEN 1 TABLET: 7.5; 325 TABLET ORAL at 21:22

## 2020-01-22 RX ADMIN — TIZANIDINE 2 MG: 4 TABLET ORAL at 20:39

## 2020-01-22 RX ADMIN — TIZANIDINE 2 MG: 4 TABLET ORAL at 13:46

## 2020-01-22 RX ADMIN — SERTRALINE HYDROCHLORIDE 25 MG: 25 TABLET ORAL at 08:09

## 2020-01-22 RX ADMIN — PANTOPRAZOLE SODIUM 40 MG: 40 TABLET, DELAYED RELEASE ORAL at 08:09

## 2020-01-22 RX ADMIN — ASPIRIN 81 MG: 81 TABLET, COATED ORAL at 08:01

## 2020-01-22 NOTE — PLAN OF CARE
Problem: Patient Care Overview  Goal: Plan of Care Review  Flowsheets (Taken 1/22/2020 0425)  Progress: improving  Plan of Care Reviewed With: patient  Outcome Summary: pts VSS.  has been very confused throughout the shift.  family at bedside.  HR went to 180 during periods of confusion.  pt began to yell with daughter and family.  had 25 out in hemovac.  f  Goal: Individualization and Mutuality  Note:   Pts VSS.  Pt very confused throughout shift.  Family at side.      Problem: Fall Risk (Adult)  Goal: Identify Related Risk Factors and Signs and Symptoms  Flowsheets (Taken 1/22/2020 0425)  Related Risk Factors (Fall Risk): confusion/agitation; gait/mobility problems  Signs and Symptoms (Fall Risk): presence of risk factors  Goal: Absence of Fall  Flowsheets (Taken 1/22/2020 0425)  Absence of Fall: making progress toward outcome     Problem: Laminectomy/Foraminotomy/Discectomy (Adult)  Goal: Signs and Symptoms of Listed Potential Problems Will be Absent, Minimized or Managed (Laminectomy/Foraminotomy/Discectomy)  Flowsheets (Taken 1/22/2020 0425)  Problems Assessed (Laminectomy/Laminotomy/Discectomy): cerebrospinal fluid leak  Problems Present (Laminectomy/otomy): none  Goal: Anesthesia/Sedation Recovery  Flowsheets (Taken 1/22/2020 0425)  Anesthesia/Sedation Recovery: progressing toward baseline

## 2020-01-22 NOTE — PROGRESS NOTES
NEUROSURGERY PROGRESS NOTE     LOS: 2 days   Patient Care Team:  Tony Melton MD as PCP - General (Family Medicine)  Tony Melton MD as PCP - Claims Attributed  Sachin Schumacher MD PhD as Consulting Physician (Orthopedic Surgery)  Gagandeep Dc MD as Consulting Physician (Pain Medicine)  Olive Zhou APRN as Nurse Practitioner (Nurse Practitioner)  Cira Ortega MD as Consulting Physician (Obstetrics and Gynecology)    Chief Complaint: Back and lower extremity pain with walking and standing intolerance.    POD#: 2 Days Post-Op  Procedures:  L3-5 PLIF.    Interval History:   Patient is been combative and confused throughout the night.  She has been quite agitated.  Her family is at bedside.  Patient Complaints: None.  Patient Denies: Headache.    Vital Signs: Blood pressure 130/69, pulse 95, temperature 98.4 °F (36.9 °C), temperature source Oral, resp. rate 18, SpO2 95 %, not currently breastfeeding.  Intake/Output:     Intake/Output Summary (Last 24 hours) at 1/22/2020 0648  Last data filed at 1/22/2020 0200  Gross per 24 hour   Intake 500 ml   Output 2375 ml   Net -1875 ml     Drain output: 25 mL.    Physical Exam:  The patient is awake and alert but intermittently inappropriate.  She is fairly oriented.  She follows simple commands.  Her neck is supple.  She is somewhat paranoid.  Mild heme staining is noted on her dressing.       Assessment/Plan:  1.  Lumbar spondylolisthesis, stenosis, and instability status post decompression, fusion, stabilization.  2.  Delirium: Likely multifactorial.  Check labs.  Elevate head of bed.  3.  Intraoperative dural rent: We will elevate head of bed given delirium.  4.  Vertigo: Not a problem currently.  She does have a history of this particularly with lying flat.  5.  Morbid obesity.  6.  DVT prophylaxis: Mechanical, add subcu heparin.  7.  Disposition: Given minimal drain output I will slowly try and mobilize  patient.      Braulio Bird MD  01/22/20  6:48 AM

## 2020-01-23 LAB
ANION GAP SERPL CALCULATED.3IONS-SCNC: 8 MMOL/L (ref 5–15)
BUN BLD-MCNC: 16 MG/DL (ref 8–23)
BUN/CREAT SERPL: 22.2 (ref 7–25)
CALCIUM SPEC-SCNC: 8.5 MG/DL (ref 8.6–10.5)
CHLORIDE SERPL-SCNC: 106 MMOL/L (ref 98–107)
CO2 SERPL-SCNC: 27 MMOL/L (ref 22–29)
CREAT BLD-MCNC: 0.72 MG/DL (ref 0.57–1)
GFR SERPL CREATININE-BSD FRML MDRD: 80 ML/MIN/1.73
GLUCOSE BLD-MCNC: 90 MG/DL (ref 65–99)
POTASSIUM BLD-SCNC: 4.3 MMOL/L (ref 3.5–5.2)
SODIUM BLD-SCNC: 141 MMOL/L (ref 136–145)

## 2020-01-23 PROCEDURE — 94799 UNLISTED PULMONARY SVC/PX: CPT

## 2020-01-23 PROCEDURE — 25010000002 HEPARIN (PORCINE) PER 1000 UNITS: Performed by: NEUROLOGICAL SURGERY

## 2020-01-23 PROCEDURE — 97162 PT EVAL MOD COMPLEX 30 MIN: CPT | Performed by: PHYSICAL THERAPIST

## 2020-01-23 PROCEDURE — 97116 GAIT TRAINING THERAPY: CPT | Performed by: PHYSICAL THERAPIST

## 2020-01-23 PROCEDURE — 97165 OT EVAL LOW COMPLEX 30 MIN: CPT

## 2020-01-23 PROCEDURE — 99024 POSTOP FOLLOW-UP VISIT: CPT | Performed by: NEUROLOGICAL SURGERY

## 2020-01-23 PROCEDURE — 25010000002 ONDANSETRON PER 1 MG: Performed by: NEUROLOGICAL SURGERY

## 2020-01-23 PROCEDURE — 80048 BASIC METABOLIC PNL TOTAL CA: CPT | Performed by: PHYSICIAN ASSISTANT

## 2020-01-23 RX ADMIN — HEPARIN SODIUM 5000 UNITS: 5000 INJECTION INTRAVENOUS; SUBCUTANEOUS at 05:35

## 2020-01-23 RX ADMIN — HEPARIN SODIUM 5000 UNITS: 5000 INJECTION INTRAVENOUS; SUBCUTANEOUS at 19:35

## 2020-01-23 RX ADMIN — HEPARIN SODIUM 5000 UNITS: 5000 INJECTION INTRAVENOUS; SUBCUTANEOUS at 14:29

## 2020-01-23 RX ADMIN — ASPIRIN 81 MG: 81 TABLET, COATED ORAL at 08:41

## 2020-01-23 RX ADMIN — ACETAMINOPHEN 650 MG: 325 TABLET ORAL at 04:25

## 2020-01-23 RX ADMIN — OXYCODONE HYDROCHLORIDE AND ACETAMINOPHEN 1 TABLET: 7.5; 325 TABLET ORAL at 12:31

## 2020-01-23 RX ADMIN — ACETAMINOPHEN 325 MG: 325 TABLET ORAL at 19:35

## 2020-01-23 RX ADMIN — OXYCODONE HYDROCHLORIDE AND ACETAMINOPHEN 1 TABLET: 7.5; 325 TABLET ORAL at 07:05

## 2020-01-23 RX ADMIN — PANTOPRAZOLE SODIUM 40 MG: 40 TABLET, DELAYED RELEASE ORAL at 05:35

## 2020-01-23 RX ADMIN — SERTRALINE HYDROCHLORIDE 25 MG: 25 TABLET ORAL at 08:42

## 2020-01-23 RX ADMIN — ONDANSETRON 4 MG: 2 INJECTION INTRAMUSCULAR; INTRAVENOUS at 05:35

## 2020-01-23 RX ADMIN — SENNOSIDES AND DOCUSATE SODIUM 1 TABLET: 8.6; 5 TABLET ORAL at 19:45

## 2020-01-23 RX ADMIN — MECLIZINE 25 MG: 25 TABLET ORAL at 04:37

## 2020-01-23 RX ADMIN — OXYCODONE HYDROCHLORIDE AND ACETAMINOPHEN 1 TABLET: 7.5; 325 TABLET ORAL at 21:21

## 2020-01-23 RX ADMIN — TIZANIDINE 2 MG: 4 TABLET ORAL at 14:29

## 2020-01-23 NOTE — THERAPY EVALUATION
Patient Name: Casandra Trinidad  : 1950    MRN: [id number]                              Today's Date: 2020       Admit Date: 2020    Visit Dx:     ICD-10-CM ICD-9-CM   1. Lumbar stenosis with neurogenic claudication M48.062 724.03     Patient Active Problem List   Diagnosis   • Menopause   • Generalized osteoarthrosis, involving multiple sites   • Esophageal reflux   • CLAUDIA (generalized anxiety disorder)   • Spondylosis without myelopathy or radiculopathy, lumbar region   • Lumbar stenosis with neurogenic claudication   • Spondylolisthesis of lumbar region   • DDD (degenerative disc disease), lumbar   • Morbid obesity due to excess calories (CMS/HCC)   • History of bilateral knee replacement   • History of bariatric surgery   • Physical deconditioning   • PMB (postmenopausal bleeding)     Past Medical History:   Diagnosis Date   • Acid reflux    • Age related cataract    • Arthritis    • Arthropathy, lower leg    • Dehiscence of incision    • Ear itch    • History of blood clots 1980    leg    • Myalgia and myositis    • Pain in joint, ankle and foot    • Pain in joint, hand    • Pain in joint, lower leg    • PONV (postoperative nausea and vomiting)    • Sleep apnea    • Tenosynovitis of finger    • Vertigo    • Vitamin D deficiency    • Wears glasses      Past Surgical History:   Procedure Laterality Date   • BREAST LUMPECTOMY Right 2014   • CATARACT EXTRACTION     •  SECTION      W/ TUBAL LIGATION   • CHOLECYSTECTOMY      LAP   • COLONOSCOPY     • HYSTERECTOMY      W/ SUPRAPUBIC CYSTOMY/RETROPUBIC URETHROTOMY/POSTERIOR AND ANTERIOR COLORRAPHY. Ovaries retained for urinary incontinence    • LAPAROSCOPIC GASTRIC BANDING  2007    W/ HHR ([facility])   • LUMBAR LAMINECTOMY WITH FUSION N/A 2020    Procedure: LUMBAR FUSION L3-5 FORAMINOTOMY 1-3;  Surgeon: Braulio Bird MD;  Location: [facility];  Service: Neurosurgery   • REPLACEMENT TOTAL KNEE BILATERAL     • TUBAL  ABDOMINAL LIGATION  1980     General Information     Row Name 01/23/20 1430          PT Evaluation Time/Intention    Document Type  evaluation  -CS     Mode of Treatment  physical therapy  -CS     Row Name 01/23/20 1430          General Information    Patient Profile Reviewed?  yes  -CS     Prior Level of Function  min assist:;dressing;bathing;home management;cooking;cleaning;gait Pt limited by LBP and pain in L LE  -CS     Existing Precautions/Restrictions  fall;spinal;other (see comments) orthostatic hypotension and vertigo  -CS     Barriers to Rehab  previous functional deficit used rollator prior to surgery  -CS     Row Name 01/23/20 1430          Relationship/Environment    Lives With  child(eliza), adult;spouse Daughter has taken two weeks off work to assist patient.  -CS     Row Name 01/23/20 1430          Resource/Environmental Concerns    Current Living Arrangements  home/apartment/condo  -CS     Row Name 01/23/20 1430          Home Main Entrance    Number of Stairs, Main Entrance  none  -CS     Stair Railings, Main Entrance  none  -CS     Row Name 01/23/20 1430          Stairs Within Home, Primary    Stairs, Within Home, Primary  1 story house  -CS     Number of Stairs, Within Home, Primary  none  -CS     Row Name 01/23/20 1430          Cognitive Assessment/Intervention- PT/OT    Orientation Status (Cognition)  oriented x 4  -CS     Row Name 01/23/20 1430          Safety Issues, Functional Mobility    Safety Issues Affecting Function (Mobility)  safety precautions follow-through/compliance;safety precaution awareness  -CS     Impairments Affecting Function (Mobility)  endurance/activity tolerance;pain;strength  -CS       User Key  (r) = Recorded By, (t) = Taken By, (c) = Cosigned By    Initials Name Provider Type    Emily Pardo, PT Physical Therapist        Mobility     Row Name 01/23/20 1430          Bed Mobility Assessment/Treatment    Bed Mobility Assessment/Treatment  supine-sit;sit-supine   -CS     Supine-Sit [place] (Bed Mobility)  moderate assist (50% patient effort);2 person assist;verbal cues  -CS     Sit-Supine [place] (Bed Mobility)  verbal cues;moderate assist (50% patient effort);2 person assist  -CS     Assistive Device (Bed Mobility)  head of bed elevated;bed rails  -CS     Comment (Bed Mobility)  Educated patient on log roll technique and patient needed mod assist x 2 to demo for supine <> sit. Pt reported dizziness with sitting EOB. Took pt's BP prior to sitting and BP was 128/76, with sitting BP was 143/89.  -CS     Row Name 01/23/20 1430          Transfer Assessment/Treatment    Comment (Transfers)  VC's to push off of bed to stand and to reach back for chair to sit. VC's on spinal precautions. Took patient's standing BP and BP was stable.   -CS     Row Name 01/23/20 1430          Sit-Stand Transfer    Sit-Stand [place] (Transfers)  contact guard;2 person assist;verbal cues  -CS     Assistive Device (Sit-Stand Transfers)  walker, front-wheeled  -CS     Row Name 01/23/20 1430          Gait/Stairs Assessment/Training    63417 - Gait Training Minutes   15  -CS     Gait/Stairs Assessment/Training  gait/ambulation independence;gait/ambulation assistive device;distance ambulated;gait pattern  -CS     [place] Level (Gait)  contact guard;verbal cues;other (see comments) With chair follow  -CS     Assistive Device (Gait)  walker, front-wheeled  -     Distance in Feet (Gait)  30'  -CS     Pattern (Gait)  step-through  -CS     Deviations/Abnormal Patterns (Gait)  bilateral deviations;ceasar decreased;gait speed decreased;stride length decreased  -CS     Bilateral Gait Deviations  forward flexed posture;heel strike decreased  -CS     Comment (Gait/Stairs)  Pt able to amb 30' with RW CGA with step through gait mechanics. Pt limited by fatigue, dizziness, and light headedness. VC's for patient to maintain upright posture and to perform step through gait mechanics. Took patient's  BP after ambulation and was 97/61 and patient became very hot and nauseous. Put patient back in supine in bed with cold wash cloth and BP recovered to 131/62.  -CS       User Key  (r) = Recorded By, (t) = Taken By, (c) = Cosigned By    Initials Name Provider Type    CS Emily Fuentes, DIANA Physical Therapist        Obj/Interventions     Row Name 01/23/20 1430          General ROM    GENERAL ROM COMMENTS  B LE ROM WFL  -     Row Name 01/23/20 1430          MMT (Manual Muscle Testing)    General MMT Comments  B LE MMT grossly 4/5  -     Row Name 01/23/20 1430          Therapeutic Exercise    Comment (Therapeutic Exercise)  Deferred to patient's BP dropping after ambulation and patient not feeling well  -CS     Row Name 01/23/20 1430          Static Sitting Balance    Level of [place] (Unsupported Sitting, Static Balance)  supervision  -CS     Sitting Position (Unsupported Sitting, Static Balance)  sitting on edge of bed  -CS     Time Able to Maintain Position (Unsupported Sitting, Static Balance)  4 to 5 minutes  -     Row Name 01/23/20 1430          Static Standing Balance    Level of [place] (Supported Standing, Static Balance)  contact guard assist  -CS     Time Able to Maintain Position (Supported Standing, Static Balance)  1 to 2 minutes  -CS     Assistive Device Utilized (Supported Standing, Static Balance)  walker, rolling  -CS     Row Name 01/23/20 1430          Sensory Assessment/Intervention    Sensory General Assessment  no sensation deficits identified  -CS       User Key  (r) = Recorded By, (t) = Taken By, (c) = Cosigned By    Initials Name Provider Type    Emily Pardo PT Physical Therapist        Goals/Plan     Row Name 01/23/20 1430          Bed Mobility Goal 1 (PT)    Activity/Assistive Device (Bed Mobility Goal 1, PT)  bed mobility activities, all  -CS     [place] Level/Cues Needed (Bed Mobility Goal 1, PT)  conditional independence  -CS     Time Frame (Bed Mobility  Goal 1, PT)  long term goal (LTG);5 days  -CS     Progress/Outcomes (Bed Mobility Goal 1, PT)  goal ongoing  -CS     Row Name 01/23/20 1430          Transfer Goal 1 (PT)    Activity/Assistive Device (Transfer Goal 1, PT)  sit-to-stand/stand-to-sit;bed-to-chair/chair-to-bed;walker, rolling  -CS     [place] Level/Cues Needed (Transfer Goal 1, PT)  conditional independence  -CS     Time Frame (Transfer Goal 1, PT)  long term goal (LTG);5 days  -CS     Progress/Outcome (Transfer Goal 1, PT)  goal ongoing  -CS     Row Name 01/23/20 1430          Gait Training Goal 1 (PT)    Activity/Assistive Device (Gait Training Goal 1, PT)  gait (walking locomotion);assistive device use;walker, rolling  -CS     [place] Level (Gait Training Goal 1, PT)  conditional independence  -CS     Distance (Gait Goal 1, PT)  150'  -CS     Time Frame (Gait Training Goal 1, PT)  long term goal (LTG);5 days  -CS     Progress/Outcome (Gait Training Goal 1, PT)  goal ongoing  -CS     Row Name 01/23/20 1430          Stairs Goal 1 (PT)    Activity/Assistive Device (Stairs Goal 1, PT)  stairs, all skills;walker, rolling  -CS     [place] Level/Cues Needed (Stairs Goal 1, PT)  contact guard assist  -CS     Number of Stairs (Stairs Goal 1, PT)  1  -CS     Time Frame (Stairs Goal 1, PT)  long term goal (LTG);5 days  -CS     Progress/Outcome (Stairs Goal 1, PT)  goal ongoing  -CS       User Key  (r) = Recorded By, (t) = Taken By, (c) = Cosigned By    Initials Name Provider Type    CS Emily Fuentes, PT Physical Therapist        Clinical Impression     Row Name 01/23/20 1430          Pain Assessment    Additional Documentation  Pain Scale: Numbers Pre/Post-Treatment (Group)  -CS     Row Name 01/23/20 1430          Pain Scale: Numbers Pre/Post-Treatment    Pain Scale: Numbers, Pretreatment  5/10  -CS     Pain Scale: Numbers, Post-Treatment  0/10 - no pain  -CS     Pain Location - Side  Bilateral  -CS     Pain Location - Orientation   incisional  -CS     Pain Location  back  -CS     Pain Intervention(s)  Repositioned;Ambulation/increased activity  -CS     Row Name 01/23/20 1430          Plan of Care Review    Plan of Care Reviewed With  patient;daughter  -CS     Progress  improving  -CS     Row Name 01/23/20 1430          Physical Therapy Clinical Impression    Patient/Family Goals Statement (PT Clinical Impression)  To go home  -CS     Criteria for Skilled Interventions Met (PT Clinical Impression)  yes;treatment indicated  -CS     Rehab Potential (PT Clinical Summary)  good, to achieve stated therapy goals  -CS     Predicted Duration of Therapy (PT)  5 days  -CS     Row Name 01/23/20 1430          Vital Signs    Pre Systolic BP Rehab  128  -CS     Pre Treatment Diastolic BP  76  -CS     Intra Systolic BP Rehab  143 Sitting 143/89 - after ambulation 97/61  -CS     Intra Treatment Diastolic BP  89  -CS     Post Systolic BP Rehab  131  -CS     Post Treatment Diastolic BP  62  -CS     Row Name 01/23/20 1430          Positioning and Restraints    Pre-Treatment Position  in bed  -CS     Post Treatment Position  bed  -CS     In Bed  notified nsg;side lying right;call light within reach;encouraged to call for assist;exit alarm on;with family/caregiver;side rails up x3;pillow between legs  -CS       User Key  (r) = Recorded By, (t) = Taken By, (c) = Cosigned By    Initials Name Provider Type    Emily Pardo, PT Physical Therapist        Outcome Measures     Row Name 01/23/20 1430          How much help from another person do you currently need...    Turning from your back to your side while in flat bed without using bedrails?  2  -CS     Moving from lying on back to sitting on the side of a flat bed without bedrails?  2  -CS     Moving to and from a bed to a chair (including a wheelchair)?  3  -CS     Standing up from a chair using your arms (e.g., wheelchair, bedside chair)?  3  -CS     Climbing 3-5 steps with a railing?  3  -CS     To walk  in hospital room?  3  -CS     AM-PAC 6 Clicks Score (PT)  16  -CS     Row Name 01/23/20 1430          Functional Assessment    Outcome Measure Options  AM-PAC 6 Clicks Basic Mobility (PT)  -CS       User Key  (r) = Recorded By, (t) = Taken By, (c) = Cosigned By    Initials Name Provider Type    Emily Pardo, PT Physical Therapist          PT Recommendation and Plan  Planned Therapy Interventions (PT Eval): balance training, bed mobility training, gait training, home exercise program, neuromuscular re-education, patient/family education, stair training, strengthening, transfer training  Outcome Summary/Treatment Plan (PT)  Anticipated Equipment Needs at Discharge (PT): other (see comments)(none)  Anticipated Discharge Disposition (PT): home with assist, home with home health  Plan of Care Reviewed With: patient, daughter  Progress: improving  Outcome Summary: Pt able to ambulate 60' with RW CGA with step through gait mechanics and chair follow. Pt limited by dizziness, nausea, and decrease in BP. Pt's BP with sitting EOB was 128/76 and continued to be stable to standing. Measured BP after ambulation and BP dropped to 97/61. Once patient was back in bed BP recovered to 131/62. Recommend patient home with assist and home health PT to address patient's strength and endurance. Will continue to progress patient's mobility as able.     Time Calculation:   PT Charges     Row Name 01/23/20 1430             Time Calculation    Start Time  1430  -      PT Received On  01/23/20  -      PT Goal Re-Cert Due Date  02/02/20  -         Time Calculation- PT    Total Timed Code Minutes- PT  20 minute(s)  -CS         Timed Charges    55317 - Gait Training Minutes   15  -CS      98350 - PT Therapeutic Activity Minutes  5  -CS        User Key  (r) = Recorded By, (t) = Taken By, (c) = Cosigned By    Initials Name Provider Type    Emily Pardo, DIANA Physical Therapist        Therapy Charges for Today     Code  Description Service Date Service Provider Modifiers Qty    62998563631 HC GAIT TRAINING EA 15 MIN 1/23/2020 Emily Fuentes, PT GP 1    [id number] HC PT EVAL MOD COMPLEXITY 3 1/23/2020 Emily Fuentes, PT GP 1          PT G-Codes  Outcome Measure Options: AM-PAC 6 Clicks Basic Mobility (PT)  AM-PAC 6 Clicks Score (PT): 16  AM-PAC 6 Clicks Score (OT): 14    Emily Fuentes, PT  1/23/2020

## 2020-01-23 NOTE — PROGRESS NOTES
NEUROSURGERY PROGRESS NOTE     LOS: 3 days   Patient Care Team:  Tony Melton MD as PCP - General (Family Medicine)  Tony Melton MD as PCP - Claims Attributed  Sachin Schumacher MD PhD as Consulting Physician (Orthopedic Surgery)  Gagandeep Dc MD as Consulting Physician (Pain Medicine)  Olive Zhou APRN as Nurse Practitioner (Nurse Practitioner)  Cira Ortega MD as Consulting Physician (Obstetrics and Gynecology)    Chief Complaint: Back and lower extremity pain with walking and standing intolerance.    POD#: 3 Days Post-Op  Procedures:  L3-5 PLIF.    Interval History:   Patient Complaints: Incisional pain.  Patient Denies: Headache, numbness, weakness.    Vital Signs: Blood pressure 110/56, pulse 65, temperature 98 °F (36.7 °C), temperature source Oral, resp. rate 16, SpO2 97 %, not currently breastfeeding.  Intake/Output:     Intake/Output Summary (Last 24 hours) at 1/23/2020 0626  Last data filed at 1/23/2020 0515  Gross per 24 hour   Intake --   Output 980 ml   Net -980 ml       Physical Exam:  The patient is awake and alert.  She is well oriented and follows all commands.  She is in good spirits.  There is some drainage around her drain site.     Data Review:    Results from last 7 days   Lab Units 01/22/20  0716   WBC 10*3/mm3 11.92*   HEMOGLOBIN g/dL 10.7*   HEMATOCRIT % 34.1   PLATELETS 10*3/mm3 222     Results from last 7 days   Lab Units 01/23/20  0107   SODIUM mmol/L 141   POTASSIUM mmol/L 4.3   CHLORIDE mmol/L 106   CO2 mmol/L 27.0   BUN mg/dL 16   CREATININE mg/dL 0.72   GLUCOSE mg/dL 90   CALCIUM mg/dL 8.5*     Drain output: 80/25 mL.    Assessment/Plan:  1.  Lumbar spondylolisthesis, stenosis, and instability status post decompression, fusion, stabilization.  2.  Delirium: Likely multifactorial, resolved.  3.  Intraoperative dural rent: We will elevate head of bed given delirium.  4.  Vertigo: Current intermittently.  5.  Morbid obesity.  6.  DVT  prophylaxis: Mechanical, add subcu heparin.  7.  Disposition:  DC drain and observe.      Braulio Bird MD  01/23/20  6:26 AM

## 2020-01-23 NOTE — PROGRESS NOTES
Continued Stay Note  [facility]     Patient Name: Casandra Trinidad  MRN: [id number]  Today's Date: 1/23/2020    Admit Date: 1/20/2020    Discharge Plan     Row Name 01/23/20 1409       Plan    Plan  TBD    Patient/Family in Agreement with Plan  yes    Plan Comments  Patient no longer on bedrest. Awaiting therapy eval and will make referrals if indicated pending their recs. CM following.     Final Discharge Disposition Code  --        Discharge Codes    No documentation.       Expected Discharge Date and Time     Expected Discharge Date Expected Discharge Time    Jan 24, 2020             Tenisha Lai RN

## 2020-01-23 NOTE — PLAN OF CARE
Patient confused intermittently. Was calm and cooperative with me, complaining about previous shift thinking she was confused even while she told me about all the bugs on the ceiling and the people trying to get her into a cult. She has slept some today and her confusion may be mildly better the longer she sleeps. Dressing remains intact with small amount of drainage. Hemovac in place with 80 mL output this shift. Two doses PRN pain medication administered. Patient became hypotensive late this afternoon. 500 mL bolus administered and small improvement in BP. Patient is asymptomatic. Daughters at bedside.

## 2020-01-23 NOTE — THERAPY EVALUATION
Acute Care - Occupational Therapy Initial Evaluation   [place]     Patient Name: Casandra Trinidad  : 1950  MRN: [id number]  Today's Date: 2020             Admit Date: 2020       ICD-10-CM ICD-9-CM   1. Lumbar stenosis with neurogenic claudication M48.062 724.03     Patient Active Problem List   Diagnosis   • Menopause   • Generalized osteoarthrosis, involving multiple sites   • Esophageal reflux   • CLAUDIA (generalized anxiety disorder)   • Spondylosis without myelopathy or radiculopathy, lumbar region   • Lumbar stenosis with neurogenic claudication   • Spondylolisthesis of lumbar region   • DDD (degenerative disc disease), lumbar   • Morbid obesity due to excess calories (CMS/[facility])   • History of bilateral knee replacement   • History of bariatric surgery   • Physical deconditioning   • PMB (postmenopausal bleeding)     Past Medical History:   Diagnosis Date   • Acid reflux    • Age related cataract    • Arthritis    • Arthropathy, lower leg    • Dehiscence of incision    • Ear itch    • History of blood clots 1980    leg    • Myalgia and myositis    • Pain in joint, ankle and foot    • Pain in joint, hand    • Pain in joint, lower leg    • PONV (postoperative nausea and vomiting)    • Sleep apnea    • Tenosynovitis of finger    • Vertigo    • Vitamin D deficiency    • Wears glasses      Past Surgical History:   Procedure Laterality Date   • BREAST LUMPECTOMY Right 2014   • CATARACT EXTRACTION     •  SECTION      W/ TUBAL LIGATION   • CHOLECYSTECTOMY      LAP   • COLONOSCOPY     • HYSTERECTOMY      W/ SUPRAPUBIC CYSTOMY/RETROPUBIC URETHROTOMY/POSTERIOR AND ANTERIOR COLORRAPHY. Ovaries retained for urinary incontinence    • LAPAROSCOPIC GASTRIC BANDING  2007    W/ HHR ([facility])   • LUMBAR LAMINECTOMY WITH FUSION N/A 2020    Procedure: LUMBAR FUSION L3-5 FORAMINOTOMY 1-3;  Surgeon: Braulio Bird MD;  [facility];  Service: Neurosurgery   • REPLACEMENT  TOTAL KNEE BILATERAL     • TUBAL ABDOMINAL LIGATION  1980          OT ASSESSMENT FLOWSHEET (last 12 hours)      Occupational Therapy Evaluation     Row Name 01/23/20 1500                   OT Evaluation Time/Intention    Subjective Information  complains of;pain;dizziness;nausea/vomiting  -KA        Document Type  evaluation  -KA        Patient Effort  excellent  -KA        Symptoms Noted During/After Treatment  (S) dizziness;increased pain;significant change in vital signs orthostatic hypotension w/mobility.   -KA        Comment  pt educated on spinal precautions and verbalized with no cues.   -KA           General Information    Patient Profile Reviewed?  yes  -KA        Patient Observations  alert;cooperative;agree to therapy  -KA        Patient/Family Observations  dtr @ bedside  -KA        Prior Level of Function  min assist:;ADL's;home management;cooking;cleaning;driving;shopping  -KA        Equipment Currently Used at Home  shower chair;commode, bedside;rollator  -KA        Existing Precautions/Restrictions  fall;spinal;other (see comments) orthostatic hypotension w/mobility; vertigo   -KA        Risks Reviewed  patient and family:;nausea/vomiting;LOB;dizziness;increased discomfort;increased drainage;change in vital signs;lines disloged  -KA        Benefits Reviewed  patient:;improve function;increase independence;increase strength;increase balance;decrease pain;decrease risk of DVT;improve skin integrity;increase knowledge  -KA        Barriers to Rehab  previous functional deficit  -KA           Relationship/Environment    Lives With  child(eliza), adult;spouse  -KA        Family Caregiver if Needed  child(eliza), adult;spouse  -KA        Concerns About Impact on Relationships  dtr took 2 weeks off work.     -KA           Resource/Environmental Concerns    Current Living Arrangements  home/apartment/condo  -KA        Resource/Environmental Concerns  none  -KA           Home Main Entrance    Number of Stairs,  Main Entrance  one  -KA        Stair Railings, Main Entrance  none  -KA           Cognitive Assessment/Interventions    Additional Documentation  Cognitive Assessment/Intervention (Group)  -           Cognitive Assessment/Intervention- PT/OT    Affect/Mental Status (Cognitive)  WFL  -KA        Orientation Status (Cognition)  oriented x 4  -KA        Follows Commands (Cognition)  WFL  -KA        Cognitive Function (Cognitive)  safety deficit  -KA        Safety Deficit (Cognitive)  insight into deficits/self awareness;safety precautions awareness;safety precautions follow-through/compliance;awareness of need for assistance;moderate deficit  -KA           Bed Mobility Assessment/Treatment    Bed Mobility Assessment/Treatment  rolling right;rolling left;supine-sit;sit-supine  -KA        Rolling Left [place] (Bed Mobility)  moderate assist (50% patient effort);2 person assist;verbal cues  -KA        Rolling Right [place] (Bed Mobility)  moderate assist (50% patient effort);verbal cues  -KA        Supine-Sit [place] (Bed Mobility)  moderate assist (50% patient effort);2 person assist;verbal cues  -KA        Sit-Supine [place] (Bed Mobility)  moderate assist (50% patient effort);2 person assist;verbal cues  -KA        Comment (Bed Mobility)  pt educated on log rolling technique.     -           Functional Mobility    Functional Mobility- Ind. Level  contact guard assist;2 person assist required  -        Functional Mobility- Device  rolling walker  -        Functional Mobility-Distance (Feet)  20  -KA        Functional Mobility- Comment  (S) orthostatic hypotension.   -           Transfer Assessment/Treatment    Transfer Assessment/Treatment  sit-stand transfer;stand-sit transfer  -           Sit-Stand Transfer    Sit-Stand [place] (Transfers)  contact guard;2 person assist;verbal cues  -        Assistive Device (Sit-Stand Transfers)  walker, front-wheeled  -           Stand-Sit  Transfer    Stand-Sit [place] (Transfers)  contact guard;2 person assist;verbal cues  -KA        Assistive Device (Stand-Sit Transfers)  walker, front-wheeled  -KA           ADL Assessment/Intervention    BADL Assessment/Intervention  lower body dressing  -KA           Lower Body Dressing Assessment/Training    Lower Body Dressing [place] Level  don;socks;dependent (less than 25% patient effort)  -KA        Lower Body Dressing Position  supine  -KA           BADL Safety/Performance    Impairments, BADL Safety/Performance  balance;endurance/activity tolerance;pain;strength  -KA           General ROM    GENERAL ROM COMMENTS  B UE WFL for eval purposes   -KA           MMT (Manual Muscle Testing)    General MMT Comments  B UE WFL for eval purposes.   -KA           Positioning and Restraints    Pre-Treatment Position  in bed  -KA        Post Treatment Position  bed  -KA        In Bed  notified nsg;side lying right;call light within reach;encouraged to call for assist;exit alarm on;with family/caregiver;side rails up x3;pillow between legs  -KA           Pain Assessment    Additional Documentation  Pain Scale: Numbers Pre/Post-Treatment (Group)  -KA           Pain Scale: Numbers Pre/Post-Treatment    Pain Scale: Numbers, Pretreatment  5/10  -KA        Pain Scale: Numbers, Post-Treatment  0/10 - no pain  -KA        Pain Location - Side  Bilateral  -KA        Pain Location - Orientation  lower  -KA        Pain Location  back  -KA        Pre/Post Treatment Pain Comment  tolerated   -KA        Pain Intervention(s)  Repositioned;Ambulation/increased activity  -KA           Wound 01/20/20 lower back Incision    Wound - Properties Group Date first assessed: 01/20/20  -WS Present on Hospital Admission: N  -WS Orientation: lower  -WS Location: back  -WS Primary Wound Type: Incision  -WS       Coping    Observed Emotional State  accepting;calm;cooperative  -KA        Verbalized Emotional State  acceptance  -KA            Plan of Care Review    Plan of Care Reviewed With  patient  -KA           Clinical Impression (OT)    OT Diagnosis  decreased ADL/IADL independence  -KA        Patient/Family Goals Statement (OT Eval)  pain free ADL/IADLs  -KA        Criteria for Skilled Therapeutic Interventions Met (OT Eval)  yes;treatment indicated  -KA        Rehab Potential (OT Eval)  good, to achieve stated therapy goals  -KA        Therapy Frequency (OT Eval)  daily  -KA        Predicted Duration of Therapy Intervention (Therapy Eval)  10  -KA        Care Plan Review (OT)  evaluation/treatment results reviewed;care plan/treatment goals reviewed;risks/benefits reviewed;current/potential barriers reviewed;patient/other agree to care plan  -KA        Anticipated Discharge Disposition (OT)  home with 24/7 care  -KA           Vital Signs    Pre Systolic BP Rehab  143  -KA        Pre Treatment Diastolic BP  89  -KA        Intra Systolic BP Rehab  (S) 97  -KA        Intra Treatment Diastolic BP  (S) 61  -KA        Post Systolic BP Rehab  131  -KA        Post Treatment Diastolic BP  62  -KA        Pre Patient Position  Side Lying  -KA        Intra Patient Position  Standing  -KA        Post Patient Position  Side Lying  -KA           OT Goals    Transfer Goal Selection (OT)  transfer, OT goal 1  -KA        Dressing Goal Selection (OT)  dressing, OT goal 1  -KA        Toileting Goal Selection (OT)  toileting, OT goal 1  -KA           Transfer Goal 1 (OT)    Activity/Assistive Device (Transfer Goal 1, OT)  commode;walker, rolling  -KA        [place] Level/Cues Needed (Transfer Goal 1, OT)  contact guard assist  -KA        Time Frame (Transfer Goal 1, OT)  long term goal (LTG);by discharge  -KA        Progress/Outcome (Transfer Goal 1, OT)  goal ongoing  -KA           Dressing Goal 1 (OT)    Activity/Assistive Device (Dressing Goal 1, OT)  lower body dressing;reacher;sock-aid socks, pants   -KA        [place]/Cues Needed (Dressing Goal 1, OT)   contact guard assist  -KA        Time Frame (Dressing Goal 1, OT)  long term goal (LTG);by discharge  -KA        Progress/Outcome (Dressing Goal 1, OT)  goal ongoing  -KA           Toileting Goal 1 (OT)    Activity/Device (Toileting Goal 1, OT)  perform perineal hygiene;adjust/manage clothing;grab bar/safety frame;raised toilet seat;toilet paper aid  -KA        [place] Level/Cues Needed (Toileting Goal 1, OT)  contact guard assist  -KA        Time Frame (Toileting Goal 1, OT)  long term goal (LTG);by discharge  -KA        Progress/Outcome (Toileting Goal 1, OT)  goal ongoing  -KA           Living Environment    Home Accessibility  stairs to enter home;tub/shower is not walk in  -KA          User Key  (r) = Recorded By, (t) = Taken By, (c) = Cosigned By    Initials Name Effective Dates    WS Keshia Calderón RN 08/25/17 -     KA Claire Hunt, DAVID 07/17/19 -                OT Recommendation and Plan  Outcome Summary/Treatment Plan (OT)  Anticipated Discharge Disposition (OT): home with 24/7 care  Therapy Frequency (OT Eval): daily  Plan of Care Review  Plan of Care Reviewed With: patient  Plan of Care Reviewed With: patient  Outcome Summary: Pt educated on spinal precautions and verbalized understanding.  Supine<>EOB: modAx2.  Sit<>Stand: CGAx2 w/RW.  Functional Mobility: CGAx2 w/RW; orthostatic hypotension w/mobility (97/61).  Limited by dizziness (hx of vertigo), strength, endurance, balance, pain.  Recommend home w/24.7 assist/SUP and HH.  Will cont to observe and address ADL/IADL deficits as needed.    Outcome Measures     Row Name 01/23/20 6073             How much help from another is currently needed...    Putting on and taking off regular lower body clothing?  2  -KA      Bathing (including washing, rinsing, and drying)  2  -KA      Toileting (which includes using toilet bed pan or urinal)  2  -KA      Putting on and taking off regular upper body clothing  2  -KA      Taking care of personal  grooming (such as brushing teeth)  3  -KA      Eating meals  3  -KA      AM-PAC 6 Clicks Score (OT)  14  -KA         Functional Assessment    Outcome Measure Options  AM-PAC 6 Clicks Daily Activity (OT)  -KA        User Key  (r) = Recorded By, (t) = Taken By, (c) = Cosigned By    Initials Name Provider Type    Claire Wallace OT Occupational Therapist          Time Calculation:   Time Calculation- OT     Row Name 01/23/20 1410             Time Calculation- OT    OT Start Time  1410  -KA      OT Received On  01/23/20  -KA      OT Goal Re-Cert Due Date  02/02/20  -KA        User Key  (r) = Recorded By, (t) = Taken By, (c) = Cosigned By    Initials Name Provider Type    Claire Wallace OT Occupational Therapist        Therapy Charges for Today     Code Description Service Date Service Provider Modifiers Qty    [id number] HC OT EVAL LOW COMPLEXITY 4 1/23/2020 Claire Hunt OT GO 1               Claire Hunt OT  1/23/2020

## 2020-01-23 NOTE — PLAN OF CARE
Problem: Patient Care Overview  Goal: Plan of Care Review  Outcome: Ongoing (interventions implemented as appropriate)  Flowsheets (Taken 1/23/2020 1635)  Progress: improving  Plan of Care Reviewed With: patient  Outcome Summary: patient has been alert and oriented. bedrest d/c pt up with PT. Limited by dizziness and nausea. Able to ambulate aprox 40 ft. [place] d/c at 1200 has until  1800 to void on her own. Hemovac drain removed  in the morning sterile d/c complete. Moderate amount of serosanguinous drainage present from Hemovac site. Pain managed with percocet. Will continue to monitor      Problem: Fall Risk (Adult)  Goal: Identify Related Risk Factors and Signs and Symptoms  Outcome: Ongoing (interventions implemented as appropriate)  Flowsheets (Taken 1/23/2020 1635)  Related Risk Factors (Fall Risk): fatigue/slow reaction; gait/mobility problems; history of falls     Problem: Laminectomy/Foraminotomy/Discectomy (Adult)  Goal: Signs and Symptoms of Listed Potential Problems Will be Absent, Minimized or Managed (Laminectomy/Foraminotomy/Discectomy)  Outcome: Ongoing (interventions implemented as appropriate)  Flowsheets (Taken 1/23/2020 1635)  Problems Assessed (Laminectomy/Laminotomy/Discectomy): all  Problems Present (Laminectomy/otomy): pain; functional decline/self-care deficit     Problem: Skin Injury Risk (Adult)  Goal: Identify Related Risk Factors and Signs and Symptoms  Outcome: Ongoing (interventions implemented as appropriate)  Flowsheets (Taken 1/23/2020 1635)  Related Risk Factors (Skin Injury Risk): advanced age; mobility impaired; body weight extremes     Problem: Laminectomy/Foraminotomy/Discectomy (Adult)  Goal: Anesthesia/Sedation Recovery  Outcome: Outcome(s) achieved  Flowsheets (Taken 1/23/2020 1635)  Anesthesia/Sedation Recovery: recovered to baseline

## 2020-01-23 NOTE — PLAN OF CARE
Problem: Patient Care Overview  Goal: Plan of Care Review  Outcome: Ongoing (interventions implemented as appropriate)  Flowsheets (Taken 1/23/2020 4294)  Plan of Care Reviewed With: patient  Note:   Patient Alert. Family at bedside. Patients SBP dropped to 70s at beginning of shift. On call PA Sendy) for neuro contacted. Orders for 500 ml bolus of NS given. Continued to monitor BP, after bolus pressure remained in 70s. Patient noted to have decreased U/O. PA contacted again and orders for 500ml bolus given and orders to increase continuous LR from 50ml/hr to 100ML/Hr a BMP, and to hold pain medication and muscle relaxer's.. Patients pressure now WNL. Improved U/O noted. C/O dizziness when shifting in bed and Meclizine given. 25ML output from drain.

## 2020-01-23 NOTE — PLAN OF CARE
Problem: Patient Care Overview  Goal: Plan of Care Review  Outcome: Ongoing (interventions implemented as appropriate)  Flowsheets (Taken 1/23/2020 1430)  Outcome Summary: Pt able to ambulate 60' with RW CGA with step through gait mechanics and chair follow. Pt limited by dizziness, nausea, and decrease in BP. Pt's BP with sitting EOB was 128/76 and continued to be stable to standing. Measured BP after ambulation and BP dropped to 97/61. Once patient was back in bed BP recovered to 131/62. Recommend patient home with assist and home health PT to address patient's strength and endurance. Will continue to progress patient's mobility as able.

## 2020-01-23 NOTE — PLAN OF CARE
Problem: Patient Care Overview  Goal: Plan of Care Review  Flowsheets (Taken 1/23/2020 1410)  Plan of Care Reviewed With: patient  Outcome Summary: Pt educated on spinal precautions and verbalized understanding.  Supine<>EOB: modAx2.  Sit<>Stand: CGAx2 w/RW.  Functional Mobility: CGAx2 w/RW; orthostatic hypotension w/mobility (97/61).  Limited by dizziness, strength, endurance, balance, pain.  Recommend home w/24.7 assist/SUP and HH.  Will cont to observe and address ADL/IADL deficits as needed.

## 2020-01-24 PROCEDURE — 97110 THERAPEUTIC EXERCISES: CPT | Performed by: PHYSICAL THERAPIST

## 2020-01-24 PROCEDURE — 94799 UNLISTED PULMONARY SVC/PX: CPT

## 2020-01-24 PROCEDURE — 25010000002 HEPARIN (PORCINE) PER 1000 UNITS: Performed by: NEUROLOGICAL SURGERY

## 2020-01-24 PROCEDURE — 99024 POSTOP FOLLOW-UP VISIT: CPT | Performed by: NEUROLOGICAL SURGERY

## 2020-01-24 PROCEDURE — 97116 GAIT TRAINING THERAPY: CPT | Performed by: PHYSICAL THERAPIST

## 2020-01-24 RX ORDER — OXYCODONE AND ACETAMINOPHEN 7.5; 325 MG/1; MG/1
1 TABLET ORAL 3 TIMES DAILY PRN
Qty: 40 TABLET | Refills: 0 | Status: SHIPPED | OUTPATIENT
Start: 2020-01-24 | End: 2020-03-25

## 2020-01-24 RX ADMIN — Medication 3 ML: at 20:19

## 2020-01-24 RX ADMIN — DOCUSATE SODIUM 100 MG: 100 CAPSULE, LIQUID FILLED ORAL at 03:39

## 2020-01-24 RX ADMIN — ACETAMINOPHEN 650 MG: 325 TABLET ORAL at 15:44

## 2020-01-24 RX ADMIN — ACETAMINOPHEN 650 MG: 325 TABLET ORAL at 20:12

## 2020-01-24 RX ADMIN — DOCUSATE SODIUM 100 MG: 100 CAPSULE, LIQUID FILLED ORAL at 20:23

## 2020-01-24 RX ADMIN — OXYCODONE HYDROCHLORIDE AND ACETAMINOPHEN 1 TABLET: 7.5; 325 TABLET ORAL at 03:40

## 2020-01-24 RX ADMIN — MECLIZINE 25 MG: 25 TABLET ORAL at 07:14

## 2020-01-24 RX ADMIN — SERTRALINE HYDROCHLORIDE 25 MG: 25 TABLET ORAL at 08:53

## 2020-01-24 RX ADMIN — HEPARIN SODIUM 5000 UNITS: 5000 INJECTION INTRAVENOUS; SUBCUTANEOUS at 20:12

## 2020-01-24 RX ADMIN — TIZANIDINE 2 MG: 4 TABLET ORAL at 05:07

## 2020-01-24 RX ADMIN — TIZANIDINE 2 MG: 4 TABLET ORAL at 13:28

## 2020-01-24 RX ADMIN — ACETAMINOPHEN 650 MG: 325 TABLET ORAL at 08:53

## 2020-01-24 RX ADMIN — MECLIZINE 25 MG: 25 TABLET ORAL at 15:46

## 2020-01-24 RX ADMIN — BISACODYL 10 MG: 10 SUPPOSITORY RECTAL at 08:53

## 2020-01-24 RX ADMIN — ASPIRIN 81 MG: 81 TABLET, COATED ORAL at 08:53

## 2020-01-24 RX ADMIN — HEPARIN SODIUM 5000 UNITS: 5000 INJECTION INTRAVENOUS; SUBCUTANEOUS at 13:28

## 2020-01-24 RX ADMIN — PANTOPRAZOLE SODIUM 40 MG: 40 TABLET, DELAYED RELEASE ORAL at 05:07

## 2020-01-24 RX ADMIN — HEPARIN SODIUM 5000 UNITS: 5000 INJECTION INTRAVENOUS; SUBCUTANEOUS at 05:07

## 2020-01-24 NOTE — PROGRESS NOTES
NEUROSURGERY PROGRESS NOTE     LOS: 4 days   Patient Care Team:  Tony Melton MD as PCP - General (Family Medicine)  Tony Melton MD as PCP - Claims Attributed  Sachin Schumacher MD PhD as Consulting Physician (Orthopedic Surgery)  Gagandeep Dc MD as Consulting Physician (Pain Medicine)  Olive Zhou APRN as Nurse Practitioner (Nurse Practitioner)  Cira Ortega MD as Consulting Physician (Obstetrics and Gynecology)    Chief Complaint: Back and bilateral lower extremity pain with walking and standing intolerance.    POD#: 4 Days Post-Op  Procedures:  L3-5 PLIF.    Interval History:   The patient ambulated a short distance in the dimas yesterday.  Patient Complaints: Some incisional pain.  Patient Denies: Headache or voiding difficulty.    Vital Signs: Blood pressure 130/58, pulse 71, temperature 98.2 °F (36.8 °C), temperature source Oral, resp. rate 18, SpO2 95 %, not currently breastfeeding.  Intake/Output:     Intake/Output Summary (Last 24 hours) at 1/24/2020 0616  Last data filed at 1/24/2020 0342  Gross per 24 hour   Intake 600 ml   Output 1475 ml   Net -875 ml       Physical Exam:  The patient is awake and alert.  She is in good spirits and appropriate.  She follows all commands.  Dry dressing is in place on her incision.     Assessment/Plan:  1.  Lumbar spondylolisthesis, stenosis, and instability status post decompression, fusion, stabilization.  2.  Delirium: Likely multifactorial, resolved.  3.  Intraoperative dural rent.  4.  Vertigo: Occurs intermittently.  5.  Morbid obesity.  6.  DVT prophylaxis: Mechanical, add subcu heparin.  7.  Disposition: Mobilize today.  The plan is to send her home tomorrow with home health for PT and OT.  She is to follow-up in the office in 10-12 days with PA-C for suture removal.  Orders placed and Rx on chart.         Braulio Bird MD  01/24/20  6:16 AM

## 2020-01-24 NOTE — PLAN OF CARE
Problem: Patient Care Overview  Goal: Plan of Care Review  Outcome: Ongoing (interventions implemented as appropriate)  Flowsheets  Taken 1/24/2020 1540  Progress: improving  Taken 1/24/2020 1108  Plan of Care Reviewed With: patient  Outcome Summary: Pt able to amb 120' with RW CGA with step through gait mechanics with chair follow. Pt limited by fatigue and slight light headedness. Pt's BP after ambulation was 134/69. Initiated HEP and patient tolerated well. Encouraged patient to ambulate later with nursing. Will perform stair training next PT session.

## 2020-01-24 NOTE — PROGRESS NOTES
Continued Stay Note  [facility]     Patient Name: Casandra Trinidad  MRN: [id number]  Today's Date: 1/24/2020    Admit Date: 1/20/2020    Discharge Plan     Row Name 01/24/20 1138       Plan    Plan  Home w/ HH    Provided post acute provider list?  Yes    Post Acute Provider List  Home Health    Post Acute Provider Quality & Resource List  Yes    Delivered To  Patient    Method of Delivery  In person    Patient/Family in Agreement with Plan  yes    Plan Comments  Per therapy recs patient is appropriate to return home w/ HH. D/w patient and daughter at bedside and they are agreeable. Referral made to [facility]/ [facility] for SN/PT/OT/aide. She is aware of patient's pending dc today. No other needs noted. CM following.     Final Discharge Disposition Code  06 - home with home health care        Discharge Codes    No documentation.       Expected Discharge Date and Time     Expected Discharge Date Expected Discharge Time    Jan 25, 2020             Tenisha Lai RN

## 2020-01-24 NOTE — PLAN OF CARE
Problem: Patient Care Overview  Goal: Plan of Care Review  Outcome: Ongoing (interventions implemented as appropriate)  Flowsheets (Taken 1/24/2020 1627)  Progress: improving  Plan of Care Reviewed With: patient  Outcome Summary: Patient has increased mobiltiy. has been able to ambulte aorox 120 ft with PT and has been up to the C. Encouraged to walk in the afternoon however family present and pt refused. Will attempt again in the evening. Had a BM today. Vitals have been stable. Meclizine administered for vertigo. Denies numbness. Scant serosanguinous  drainage from Hemovac removal site. Possible D/C tomorrow.      Problem: Fall Risk (Adult)  Goal: Identify Related Risk Factors and Signs and Symptoms  Outcome: Ongoing (interventions implemented as appropriate)  Flowsheets (Taken 1/24/2020 1627)  Related Risk Factors (Fall Risk): history of falls;environment unfamiliar  Signs and Symptoms (Fall Risk): presence of risk factors  Goal: Absence of Fall  Outcome: Ongoing (interventions implemented as appropriate)  Flowsheets (Taken 1/24/2020 6297)  Absence of Fall: making progress toward outcome     Problem: Laminectomy/Foraminotomy/Discectomy (Adult)  Goal: Signs and Symptoms of Listed Potential Problems Will be Absent, Minimized or Managed (Laminectomy/Foraminotomy/Discectomy)  Outcome: Ongoing (interventions implemented as appropriate)  Flowsheets (Taken 1/24/2020 1627)  Problems Assessed (Laminectomy/Laminotomy/Discectomy): all  Problems Present (Laminectomy/otomy): pain;situational response

## 2020-01-24 NOTE — THERAPY TREATMENT NOTE
Patient Name: Casandra Trinidad  : 1950    MRN: [id number]                              Today's Date: 2020       Admit Date: 2020    Visit Dx:     ICD-10-CM ICD-9-CM   1. Lumbar stenosis with neurogenic claudication M48.062 724.03     Patient Active Problem List   Diagnosis   • Menopause   • Generalized osteoarthrosis, involving multiple sites   • Esophageal reflux   • CLAUDIA (generalized anxiety disorder)   • Spondylosis without myelopathy or radiculopathy, lumbar region   • Lumbar stenosis with neurogenic claudication   • Spondylolisthesis of lumbar region   • DDD (degenerative disc disease), lumbar   • Morbid obesity due to excess calories (CMS/HCC)   • History of bilateral knee replacement   • History of bariatric surgery   • Physical deconditioning   • PMB (postmenopausal bleeding)     Past Medical History:   Diagnosis Date   • Acid reflux    • Age related cataract    • Arthritis    • Arthropathy, lower leg    • Dehiscence of incision    • Ear itch    • History of blood clots 1980    leg    • Myalgia and myositis    • Pain in joint, ankle and foot    • Pain in joint, hand    • Pain in joint, lower leg    • PONV (postoperative nausea and vomiting)    • Sleep apnea    • Tenosynovitis of finger    • Vertigo    • Vitamin D deficiency    • Wears glasses      Past Surgical History:   Procedure Laterality Date   • BREAST LUMPECTOMY Right 2014   • CATARACT EXTRACTION     •  SECTION      W/ TUBAL LIGATION   • CHOLECYSTECTOMY      LAP   • COLONOSCOPY     • HYSTERECTOMY      W/ SUPRAPUBIC CYSTOMY/RETROPUBIC URETHROTOMY/POSTERIOR AND ANTERIOR COLORRAPHY. Ovaries retained for urinary incontinence    • LAPAROSCOPIC GASTRIC BANDING  2007    W/ HHR ([facility])   • LUMBAR LAMINECTOMY WITH FUSION N/A 2020    Procedure: LUMBAR FUSION L3-5 FORAMINOTOMY 1-3;  Surgeon: Braulio Bird MD;  Location: [facility];  Service: Neurosurgery   • REPLACEMENT TOTAL KNEE BILATERAL     • TUBAL  ABDOMINAL LIGATION  1980     General Information     Row Name 01/24/20 1108          PT Evaluation Time/Intention    Document Type  therapy note (daily note)  -CS     Mode of Treatment  individual therapy;physical therapy  -CS     Row Name 01/24/20 1108          General Information    Patient Profile Reviewed?  yes  -CS     Existing Precautions/Restrictions  fall;spinal Vertigo and orthostatic hypotension  -CS     Row Name 01/24/20 1108          Resource/Environmental Concerns    Current Living Arrangements  home/apartment/condo  -CS     Row Name 01/24/20 1108          Home Main Entrance    Number of Stairs, Main Entrance  one  -CS     Stair Railings, Main Entrance  none  -CS     Row Name 01/24/20 1108          Cognitive Assessment/Intervention- PT/OT    Orientation Status (Cognition)  oriented x 4  -CS     Row Name 01/24/20 1108          Safety Issues, Functional Mobility    Safety Issues Affecting Function (Mobility)  safety precautions follow-through/compliance;safety precaution awareness  -CS     Impairments Affecting Function (Mobility)  endurance/activity tolerance;pain;strength;range of motion (ROM)  -CS       User Key  (r) = Recorded By, (t) = Taken By, (c) = Cosigned By    Initials Name Provider Type    CS Emily Fuentes, PT Physical Therapist        Mobility     Row Name 01/24/20 1108          Bed Mobility Assessment/Treatment    Bed Mobility Assessment/Treatment  sit-supine;supine-sit  -CS     Supine-Sit [place] (Bed Mobility)  contact guard;verbal cues  -CS     Sit-Supine [place] (Bed Mobility)  minimum assist (75% patient effort);verbal cues  -CS     Assistive Device (Bed Mobility)  head of bed elevated;bed rails  -CS     Comment (Bed Mobility)  Educated patient on log roll technique. Pt needs min assist to lift LE's into bed with supine to sit. VC's to maintain spinal precautions with bed mobility. Pt became dizzy with sitting, took BP and BP was 127/55. Sat on EOB for several minutes  until dizziness subsided.   -CS     Row Name 01/24/20 1108          Transfer Assessment/Treatment    Comment (Transfers)  VC's to push off of bed to stand and to reach back for chair to sit. VC's to ensure patient does not flex too much with standing. Pt reported slight dizziness with standing but subsided quickly.  -CS     Row Name 01/24/20 1108          Sit-Stand Transfer    Sit-Stand [place] (Transfers)  contact guard;verbal cues;2 person assist  -CS     Assistive Device (Sit-Stand Transfers)  walker, front-wheeled  -CS     Row Name 01/24/20 1108          Gait/Stairs Assessment/Training    49716 - Gait Training Minutes   30  -CS     Gait/Stairs Assessment/Training  gait/ambulation independence;gait/ambulation assistive device;distance ambulated;gait pattern  -CS     [place] Level (Gait)  contact guard;verbal cues;other (see comments) with chair follow  -CS     Assistive Device (Gait)  walker, front-wheeled  -     Distance in Feet (Gait)  120'  -CS     Pattern (Gait)  step-through  -CS     Deviations/Abnormal Patterns (Gait)  bilateral deviations;ceasar decreased;gait speed decreased;stride length decreased  -CS     Bilateral Gait Deviations  forward flexed posture;heel strike decreased  -CS     Comment (Gait/Stairs)  Pt able to amb 120' with RW CGA with step through gait mechanics. Pt limited by fatigue and slight light headedness. VC's to maintain upright posture and stay within walker. Pt limited by fatigue. Pt's BP after ambulation was 134/69.  -CS       User Key  (r) = Recorded By, (t) = Taken By, (c) = Cosigned By    Initials Name Provider Type    CS Emily Fuentes, PT Physical Therapist        Obj/Interventions     Row Name 01/24/20 1108          Therapeutic Exercise    Lower Extremity (Therapeutic Exercise)  gluteal sets;quad sets, bilateral;heel slides, bilateral  -CS     Lower Extremity Range of Motion (Therapeutic Exercise)  hip internal/external rotation, bilateral;ankle  dorsiflexion/plantar flexion, bilateral  -CS     Core Strength (Therapeutic Exercise)  abdominal bracing  -CS     Exercise Type (Therapeutic Exercise)  isometric contraction, static;AAROM (active assistive range of motion);AROM (active range of motion);isotonic contraction, concentric  -CS     Position (Therapeutic Exercise)  supine  -CS     Sets/Reps (Therapeutic Exercise)  10 reps each   -CS     Comment (Therapeutic Exercise)  VC's on technique   -CS     Row Name 01/24/20 1108          Static Sitting Balance    Level of [place] (Unsupported Sitting, Static Balance)  supervision  -CS     Sitting Position (Unsupported Sitting, Static Balance)  sitting on edge of bed  -CS     Time Able to Maintain Position (Unsupported Sitting, Static Balance)  4 to 5 minutes  -CS     Row Name 01/24/20 1108          Static Standing Balance    Level of [place] (Supported Standing, Static Balance)  contact guard assist  -CS     Time Able to Maintain Position (Supported Standing, Static Balance)  1 to 2 minutes  -CS     Assistive Device Utilized (Supported Standing, Static Balance)  walker, rolling  -CS     Row Name 01/24/20 1108          Sensory Assessment/Intervention    Sensory General Assessment  no sensation deficits identified  -CS       User Key  (r) = Recorded By, (t) = Taken By, (c) = Cosigned By    Initials Name Provider Type    CS Emily Fuentes, PT Physical Therapist        Goals/Plan     Row Name 01/24/20 1108          Bed Mobility Goal 1 (PT)    Activity/Assistive Device (Bed Mobility Goal 1, PT)  bed mobility activities, all  -CS     [place] Level/Cues Needed (Bed Mobility Goal 1, PT)  conditional independence  -CS     Time Frame (Bed Mobility Goal 1, PT)  long term goal (LTG);5 days  -CS     Progress/Outcomes (Bed Mobility Goal 1, PT)  goal ongoing  -CS     Row Name 01/24/20 1108          Transfer Goal 1 (PT)    Activity/Assistive Device (Transfer Goal 1, PT)   sit-to-stand/stand-to-sit;bed-to-chair/chair-to-bed;walker, rolling  -CS     [place] Level/Cues Needed (Transfer Goal 1, PT)  conditional independence  -CS     Time Frame (Transfer Goal 1, PT)  long term goal (LTG);5 days  -CS     Progress/Outcome (Transfer Goal 1, PT)  goal ongoing  -CS     Row Name 01/24/20 1108          Gait Training Goal 1 (PT)    Activity/Assistive Device (Gait Training Goal 1, PT)  gait (walking locomotion);assistive device use;walker, rolling  -CS     [place] Level (Gait Training Goal 1, PT)  conditional independence  -CS     Distance (Gait Goal 1, PT)  150'  -CS     Time Frame (Gait Training Goal 1, PT)  long term goal (LTG);5 days  -CS     Progress/Outcome (Gait Training Goal 1, PT)  goal ongoing  -CS     Row Name 01/24/20 1108          Stairs Goal 1 (PT)    Activity/Assistive Device (Stairs Goal 1, PT)  stairs, all skills;walker, rolling  -CS     [place] Level/Cues Needed (Stairs Goal 1, PT)  contact guard assist  -CS     Number of Stairs (Stairs Goal 1, PT)  1  -CS     Time Frame (Stairs Goal 1, PT)  long term goal (LTG);5 days  -CS     Progress/Outcome (Stairs Goal 1, PT)  goal ongoing  -CS       User Key  (r) = Recorded By, (t) = Taken By, (c) = Cosigned By    Initials Name Provider Type    CS Emily Fuentes, PT Physical Therapist        Clinical Impression     Row Name 01/24/20 1108          Pain Assessment    Additional Documentation  Pain Scale: Numbers Pre/Post-Treatment (Group)  -CS     Row Name 01/24/20 1108          Pain Scale: Numbers Pre/Post-Treatment    Pain Scale: Numbers, Pretreatment  8/10  -CS     Pain Scale: Numbers, Post-Treatment  8/10  -CS     Pain Location - Side  Bilateral  -CS     Pain Location - Orientation  lower  -CS     Pain Location  back  -CS     Pain Intervention(s)  Repositioned;Ambulation/increased activity  -CS     Row Name 01/24/20 1108          Plan of Care Review    Plan of Care Reviewed With  patient  -CS     Progress  improving   -CS     Outcome Summary  Pt able to amb 120' with RW CGA with step through gait mechanics with chair follow. Pt limited by fatigue and slight light headedness. Pt's BP after ambulation was 134/69. Initiated HEP and patient tolerated well. Encouraged patient to ambulate later with nursing. Will perform stair training next PT session.   -CS     Row Name 01/24/20 1108          Physical Therapy Clinical Impression    Criteria for Skilled Interventions Met (PT Clinical Impression)  yes;treatment indicated  -CS     Rehab Potential (PT Clinical Summary)  good, to achieve stated therapy goals  -CS     Row Name 01/24/20 1108          Vital Signs    Pre Systolic BP Rehab  127  -CS     Pre Treatment Diastolic BP  55  -CS     Intra Systolic BP Rehab  134  -CS     Intra Treatment Diastolic BP  69  -CS     Post Systolic BP Rehab  115  -CS     Post Treatment Diastolic BP  59  -CS     Row Name 01/24/20 1108          Positioning and Restraints    Pre-Treatment Position  in bed  -CS     Post Treatment Position  bed  -CS     In Bed  notified nsg;supine;call light within reach;encouraged to call for assist;exit alarm on;with family/caregiver;legs elevated  -CS       User Key  (r) = Recorded By, (t) = Taken By, (c) = Cosigned By    Initials Name Provider Type    CS Emily Fuentes, PT Physical Therapist        Outcome Measures     Row Name 01/24/20 1101          How much help from another person do you currently need...    Turning from your back to your side while in flat bed without using bedrails?  3  -CS     Moving from lying on back to sitting on the side of a flat bed without bedrails?  3  -CS     Moving to and from a bed to a chair (including a wheelchair)?  3  -CS     Standing up from a chair using your arms (e.g., wheelchair, bedside chair)?  3  -CS     Climbing 3-5 steps with a railing?  3  -CS     To walk in hospital room?  3  -CS     AM-PAC 6 Clicks Score (PT)  18  -CS     Row Name 01/24/20 1108          Functional  Assessment    Outcome Measure Options  AM-PAC 6 Clicks Basic Mobility (PT)  -CS       User Key  (r) = Recorded By, (t) = Taken By, (c) = Cosigned By    Initials Name Provider Type    Emily Pardo PT Physical Therapist          PT Recommendation and Plan  Planned Therapy Interventions (PT Eval): balance training, bed mobility training, gait training, home exercise program, neuromuscular re-education, patient/family education, strengthening, transfer training  Outcome Summary/Treatment Plan (PT)  Anticipated Equipment Needs at Discharge (PT): other (see comments)(none)  Anticipated Discharge Disposition (PT): home with assist, home with home health  Plan of Care Reviewed With: patient  Progress: improving  Outcome Summary: Pt able to amb 120' with RW CGA with step through gait mechanics with chair follow. Pt limited by fatigue and slight light headedness. Pt's BP after ambulation was 134/69. Initiated HEP and patient tolerated well. Encouraged patient to ambulate later with nursing. Will perform stair training next PT session.      Time Calculation:   PT Charges     Row Name 01/24/20 1108             Time Calculation    Start Time  1108  -CS      PT Received On  01/24/20  -CS         Time Calculation- PT    Total Timed Code Minutes- PT  38 minute(s)  -CS         Timed Charges    26068 - PT Therapeutic Exercise Minutes  8  -CS      88275 - Gait Training Minutes   30  -CS        User Key  (r) = Recorded By, (t) = Taken By, (c) = Cosigned By    Initials Name Provider Type    Emily Pardo PT Physical Therapist        Therapy Charges for Today     Code Description Service Date Service Provider Modifiers Qty    10613927174 HC GAIT TRAINING EA 15 MIN 1/23/2020 Emily Fuentes, PT GP 1    90845932453 HC PT EVAL MOD COMPLEXITY 3 1/23/2020 Emily Fuentes, PT GP 1    80547376237 HC PT THER PROC EA 15 MIN 1/24/2020 Emily Fuentes, PT GP 1    87721527120 HC GAIT TRAINING EA 15 MIN 1/24/2020  Emily Fuentes, PT GP 2    32829618376 HC PT THER SUPP EA 15 MIN 1/24/2020 Emily Fuentes, PT GP 2          PT G-Codes  Outcome Measure Options: AM-PAC 6 Clicks Basic Mobility (PT)  AM-PAC 6 Clicks Score (PT): 18  AM-PAC 6 Clicks Score (OT): 14    Emily Fuentes PT  1/24/2020

## 2020-01-24 NOTE — PLAN OF CARE
Problem: Patient Care Overview  Goal: Plan of Care Review  Outcome: Ongoing (interventions implemented as appropriate)  Flowsheets (Taken 1/24/2020 0452)  Progress: improving  Plan of Care Reviewed With: patient  Note:   Alert and oriented. VSS. Patient up at bedside with assist x2. Ambulation limited due to pain. PO pain medications administered during shift. DSG monitored, dry/intact. Patient refused to walk during shift due to sleepiness, and vertigo.   Goal: Individualization and Mutuality  Outcome: Ongoing (interventions implemented as appropriate)  Goal: Discharge Needs Assessment  Outcome: Ongoing (interventions implemented as appropriate)  Goal: Interprofessional Rounds/Family Conf  Outcome: Ongoing (interventions implemented as appropriate)     Problem: Fall Risk (Adult)  Goal: Identify Related Risk Factors and Signs and Symptoms  Outcome: Ongoing (interventions implemented as appropriate)  Flowsheets (Taken 1/24/2020 0452)  Related Risk Factors (Fall Risk): age-related changes; history of falls; environment unfamiliar  Goal: Absence of Fall  Outcome: Ongoing (interventions implemented as appropriate)  Flowsheets (Taken 1/24/2020 0452)  Absence of Fall: making progress toward outcome     Problem: Laminectomy/Foraminotomy/Discectomy (Adult)  Goal: Signs and Symptoms of Listed Potential Problems Will be Absent, Minimized or Managed (Laminectomy/Foraminotomy/Discectomy)  Outcome: Ongoing (interventions implemented as appropriate)  Flowsheets (Taken 1/24/2020 0452)  Problems Assessed (Laminectomy/Laminotomy/Discectomy): all  Problems Present (Laminectomy/otomy): pain     Problem: Skin Injury Risk (Adult)  Goal: Identify Related Risk Factors and Signs and Symptoms  Outcome: Ongoing (interventions implemented as appropriate)  Flowsheets (Taken 1/23/2020 1635 by Olegario Mcdonald RN)  Related Risk Factors (Skin Injury Risk): advanced age;mobility impaired;body weight extremes  Goal: Skin Health and  Integrity  Outcome: Ongoing (interventions implemented as appropriate)  Flowsheets (Taken 1/24/2020 8010)  Skin Health and Integrity: making progress toward outcome

## 2020-01-25 VITALS
RESPIRATION RATE: 16 BRPM | TEMPERATURE: 97.7 F | OXYGEN SATURATION: 96 % | SYSTOLIC BLOOD PRESSURE: 143 MMHG | HEART RATE: 60 BPM | DIASTOLIC BLOOD PRESSURE: 66 MMHG

## 2020-01-25 PROCEDURE — 97110 THERAPEUTIC EXERCISES: CPT

## 2020-01-25 PROCEDURE — 97530 THERAPEUTIC ACTIVITIES: CPT

## 2020-01-25 PROCEDURE — 25010000002 HEPARIN (PORCINE) PER 1000 UNITS: Performed by: NEUROLOGICAL SURGERY

## 2020-01-25 PROCEDURE — 99024 POSTOP FOLLOW-UP VISIT: CPT | Performed by: PHYSICIAN ASSISTANT

## 2020-01-25 RX ADMIN — TIZANIDINE 2 MG: 4 TABLET ORAL at 15:17

## 2020-01-25 RX ADMIN — BISACODYL 10 MG: 10 SUPPOSITORY RECTAL at 10:13

## 2020-01-25 RX ADMIN — ASPIRIN 81 MG: 81 TABLET, COATED ORAL at 08:28

## 2020-01-25 RX ADMIN — MECLIZINE 25 MG: 25 TABLET ORAL at 05:52

## 2020-01-25 RX ADMIN — SERTRALINE HYDROCHLORIDE 25 MG: 25 TABLET ORAL at 08:28

## 2020-01-25 RX ADMIN — DOCUSATE SODIUM 100 MG: 100 CAPSULE, LIQUID FILLED ORAL at 10:13

## 2020-01-25 RX ADMIN — ACETAMINOPHEN 650 MG: 325 TABLET ORAL at 08:28

## 2020-01-25 RX ADMIN — PANTOPRAZOLE SODIUM 40 MG: 40 TABLET, DELAYED RELEASE ORAL at 05:52

## 2020-01-25 RX ADMIN — HEPARIN SODIUM 5000 UNITS: 5000 INJECTION INTRAVENOUS; SUBCUTANEOUS at 05:52

## 2020-01-25 RX ADMIN — TIZANIDINE 2 MG: 4 TABLET ORAL at 05:52

## 2020-01-25 RX ADMIN — OXYCODONE HYDROCHLORIDE AND ACETAMINOPHEN 1 TABLET: 7.5; 325 TABLET ORAL at 02:39

## 2020-01-25 RX ADMIN — MAGNESIUM HYDROXIDE 10 ML: 2400 SUSPENSION ORAL at 10:13

## 2020-01-25 RX ADMIN — SODIUM CHLORIDE 500 ML: 9 INJECTION, SOLUTION INTRAVENOUS at 10:13

## 2020-01-25 RX ADMIN — OXYCODONE HYDROCHLORIDE AND ACETAMINOPHEN 1 TABLET: 7.5; 325 TABLET ORAL at 15:18

## 2020-01-25 NOTE — PLAN OF CARE
Problem: Patient Care Overview  Goal: Plan of Care Review  Flowsheets (Taken 1/25/2020 0788)  Plan of Care Reviewed With: patient; family  Note:   Increased walking distance and level of independence. Pt amb 220' indep w/RW. She is indep w/bed mobility and transfers. Improving functional mobility.

## 2020-01-25 NOTE — PROGRESS NOTES
"           FOLLOW UP ENCOUNTER          CHIEF COMPLAINT::   Status post PLIF                        MEDICAL HISTORY SINCE LAST ENCOUNTER :       She is doing reasonably well and is to be discharged later today.  Her blood pressure has been \"low\" prior to hospitalization has remained so here.  She tolerates this well and has had no significant lightheadedness or syncope.  She is to be discharged later today to be followed up per orders of Dr. Bird.    Her wound is dry.                                            "

## 2020-01-25 NOTE — PLAN OF CARE
Problem: Patient Care Overview  Goal: Plan of Care Review  Outcome: Ongoing (interventions implemented as appropriate)  Flowsheets (Taken 1/25/2020 2837)  Progress: improving  Plan of Care Reviewed With: patient  Note:   VSS. Alert and oriented. Compliant with care. Voiding spontaneously. Pain managed with po pain medications. Patient ambulated 94 ft in hallway with assist x1.

## 2020-01-25 NOTE — THERAPY TREATMENT NOTE
Patient Name: Casandra Trinidad  : 1950    MRN: [id number]                              Today's Date: 2020       Admit Date: 2020    Visit Dx:     ICD-10-CM ICD-9-CM   1. Lumbar stenosis with neurogenic claudication M48.062 724.03     Patient Active Problem List   Diagnosis   • Menopause   • Generalized osteoarthrosis, involving multiple sites   • Esophageal reflux   • CLAUDIA (generalized anxiety disorder)   • Spondylosis without myelopathy or radiculopathy, lumbar region   • Lumbar stenosis with neurogenic claudication   • Spondylolisthesis of lumbar region   • DDD (degenerative disc disease), lumbar   • Morbid obesity due to excess calories (CMS/HCC)   • History of bilateral knee replacement   • History of bariatric surgery   • Physical deconditioning   • PMB (postmenopausal bleeding)     Past Medical History:   Diagnosis Date   • Acid reflux    • Age related cataract    • Arthritis    • Arthropathy, lower leg    • Dehiscence of incision    • Ear itch    • History of blood clots 1980    leg    • Myalgia and myositis    • Pain in joint, ankle and foot    • Pain in joint, hand    • Pain in joint, lower leg    • PONV (postoperative nausea and vomiting)    • Sleep apnea    • Tenosynovitis of finger    • Vertigo    • Vitamin D deficiency    • Wears glasses      Past Surgical History:   Procedure Laterality Date   • BREAST LUMPECTOMY Right 2014   • CATARACT EXTRACTION     •  SECTION      W/ TUBAL LIGATION   • CHOLECYSTECTOMY      LAP   • COLONOSCOPY     • HYSTERECTOMY      W/ SUPRAPUBIC CYSTOMY/RETROPUBIC URETHROTOMY/POSTERIOR AND ANTERIOR COLORRAPHY. Ovaries retained for urinary incontinence    • LAPAROSCOPIC GASTRIC BANDING  2007    W/ HHR ([facility])   • LUMBAR LAMINECTOMY WITH FUSION N/A 2020    Procedure: LUMBAR FUSION L3-5 FORAMINOTOMY 1-3;  Surgeon: Braulio Bird MD;  Location: [facility];  Service: Neurosurgery   • REPLACEMENT TOTAL KNEE BILATERAL     • TUBAL  ABDOMINAL LIGATION  1980     General Information     Row Name 01/25/20 1358          PT Evaluation Time/Intention    Document Type  therapy note (daily note)  -LS     Mode of Treatment  physical therapy  -LS     Row Name 01/25/20 7490          General Information    Patient Profile Reviewed?  yes  -LS     Existing Precautions/Restrictions  spinal  -LS       User Key  (r) = Recorded By, (t) = Taken By, (c) = Cosigned By    Initials Name Provider Type    LS Linh Navarro, PT Physical Therapist        Mobility     Row Name 01/25/20 2137          Bed Mobility Assessment/Treatment    Bed Mobility Assessment/Treatment  supine-sit-supine  -LS     Supine-Sit [place] (Bed Mobility)  independent  -LS     Sit-Supine [place] (Bed Mobility)  independent  -LS     Comment (Bed Mobility)  indep supine to and from sit on flat bed surface without using bedrails. transitioned through log rolling with appropriate technique. pt able to scoot in bed indep and demonstrated indep rolling supine to R sidelying. Would be indep rolling to L sidelying / all bed mobility per PT's clinical judgement  -LS     Row Name 01/25/20 6032          Bed-Chair Transfer    Bed-Chair [place] (Transfers)  conditional independence bedside commode to bed (tried to have BM once more upon re-entering room after walk)  -LS     Assistive Device (Bed-Chair Transfers)  walker, front-wheeled  -LS     Row Name 01/25/20 9448          Sit-Stand Transfer    Sit-Stand [place] (Transfers)  conditional independence  -LS     Assistive Device (Sit-Stand Transfers)  walker, front-wheeled  -LS     Row Name 01/25/20 2529          Gait/Stairs Assessment/Training    Gait/Stairs Assessment/Training  gait/ambulation independence;gait/ambulation assistive device  -LS     [place] Level (Gait)  conditional independence  -LS     Assistive Device (Gait)  walker, front-wheeled  -LS     Distance in Feet (Gait)  220 (one sitting rest break when pt when to  bathroom in hallway)  -LS     Pattern (Gait)  step-through  -LS     Deviations/Abnormal Patterns (Gait)  gait speed decreased;stride length decreased  -LS     Number of Steps (Stairs)  pt declined doing a step today; was in a hurry to get back to room to try to have BM. PT discussed technique for stair using RW w/pt and family.  -LS       User Key  (r) = Recorded By, (t) = Taken By, (c) = Cosigned By    Initials Name Provider Type    LS Linh Navarro, PT Physical Therapist        Obj/Interventions     Row Name 01/25/20 1358          Therapeutic Exercise    Upper Extremity Range of Motion (Therapeutic Exercise)  shoulder flexion/extension, bilateral  -LS     Lower Extremity (Therapeutic Exercise)  gluteal sets;quad sets, bilateral;heel slides, right;heel slides, left heel slides, arms overhead (sh flexion), and bent knee fallouts (all with abd set) X 10  -LS     Lower Extremity Range of Motion (Therapeutic Exercise)  hip internal/external rotation, bilateral;ankle dorsiflexion/plantar flexion, bilateral hamstring curls X 10 each leg, holding to RW  -LS     Core Strength (Therapeutic Exercise)  abdominal bracing  -LS     Exercise Type (Therapeutic Exercise)  AROM (active range of motion);isometric contraction, static  -LS     Sets/Reps (Therapeutic Exercise)  10 reps  -LS     Comment (Therapeutic Exercise)  vcs on technique  -     Row Name 01/25/20 1351          Static Sitting Balance    Level of [place] (Unsupported Sitting, Static Balance)  independent  -LS     Sitting Position (Unsupported Sitting, Static Balance)  sitting on edge of bed  -     Row Name 01/25/20 1354          Static Standing Balance    Level of [place] (Supported Standing, Static Balance)  conditional independence  -LS     Assistive Device Utilized (Supported Standing, Static Balance)  walker, rolling  -     Row Name 01/25/20 1354          Dynamic Standing Balance    Level of [place], Reaches Outside Midline (Standing,  Dynamic Balance)  conditional independence  -LS     Assistive Device Utilized (Supported Standing, Dynamic Balance)  walker, rolling  -LS       User Key  (r) = Recorded By, (t) = Taken By, (c) = Cosigned By    Initials Name Provider Type    Linh Marquez, PT Physical Therapist        Goals/Plan     Row Name 01/25/20 3732          Bed Mobility Goal 1 (PT)    Activity/Assistive Device (Bed Mobility Goal 1, PT)  bed mobility activities, all  -LS     [place] Level/Cues Needed (Bed Mobility Goal 1, PT)  conditional independence  -LS     Time Frame (Bed Mobility Goal 1, PT)  long term goal (LTG);5 days  -LS     Progress/Outcomes (Bed Mobility Goal 1, PT)  goal met  -LS     Row Name 01/25/20 [facility]          Transfer Goal 1 (PT)    Activity/Assistive Device (Transfer Goal 1, PT)  sit-to-stand/stand-to-sit;bed-to-chair/chair-to-bed;walker, rolling  -LS     [place] Level/Cues Needed (Transfer Goal 1, PT)  conditional independence  -LS     Time Frame (Transfer Goal 1, PT)  long term goal (LTG);5 days  -LS     Progress/Outcome (Transfer Goal 1, PT)  goal met  -LS     Row Name 01/25/20 0245          Gait Training Goal 1 (PT)    Activity/Assistive Device (Gait Training Goal 1, PT)  gait (walking locomotion);assistive device use;walker, rolling  -LS     [place] Level (Gait Training Goal 1, PT)  conditional independence  -LS     Distance (Gait Goal 1, PT)  150  -LS     Time Frame (Gait Training Goal 1, PT)  long term goal (LTG);5 days  -LS     Progress/Outcome (Gait Training Goal 1, PT)  goal met  -LS       User Key  (r) = Recorded By, (t) = Taken By, (c) = Cosigned By    Initials Name Provider Type    Lnih Marquez, PT Physical Therapist        Clinical Impression     Row Name 01/25/20 0033          Pain Scale: Numbers Pre/Post-Treatment    Pain Scale: Numbers, Pretreatment  0/10 - no pain  -LS     Pain Scale: Numbers, Post-Treatment  0/10 - no pain  -LS     Pre/Post Treatment Pain Comment  no  pain; c/o constipation  -       User Key  (r) = Recorded By, (t) = Taken By, (c) = Cosigned By    Initials Name Provider Type    Linh Marquez, DIANA Physical Therapist        Outcome Measures     Row Name 01/25/20 1355          How much help from another person do you currently need...    Turning from your back to your side while in flat bed without using bedrails?  4  -LS     Moving from lying on back to sitting on the side of a flat bed without bedrails?  4  -LS     Moving to and from a bed to a chair (including a wheelchair)?  4  -LS     Standing up from a chair using your arms (e.g., wheelchair, bedside chair)?  4  -LS     Climbing 3-5 steps with a railing?  3  -LS     To walk in hospital room?  4  -LS     AM-PAC 6 Clicks Score (PT)  23  -     Row Name 01/25/20 1355          Functional Assessment    Outcome Measure Options  AM-PAC 6 Clicks Basic Mobility (PT)  -       User Key  (r) = Recorded By, (t) = Taken By, (c) = Cosigned By    Initials Name Provider Type    Linh Marquez, PT Physical Therapist          PT Recommendation and Plan     Plan of Care Reviewed With: patient, family     Time Calculation:   PT Charges     Row Name 01/25/20 1440             Time Calculation    Start Time  1355  -      PT Received On  01/25/20  -         Timed Charges    76785 - PT Therapeutic Exercise Minutes  15  -LS      23905 - PT Therapeutic Activity Minutes  10  -LS        User Key  (r) = Recorded By, (t) = Taken By, (c) = Cosigned By    Initials Name Provider Type    Linh Marquez PT Physical Therapist        Therapy Charges for Today     Code Description Service Date Service Provider Modifiers Qty    45976611114 HC PT THER PROC EA 15 MIN 1/25/2020 Linh Navarro, PT GP 1    44765648479 HC PT THERAPEUTIC ACT EA 15 MIN 1/25/2020 Linh Navarro, PT GP 1          PT G-Codes  Outcome Measure Options: AM-PAC 6 Clicks Basic Mobility (PT)  AM-PAC 6 Clicks Score (PT): 23  AM-PAC 6  Clicks Score (OT): 14    Linh Navarro, PT  1/25/2020

## 2020-01-26 ENCOUNTER — READMISSION MANAGEMENT (OUTPATIENT)
Dept: CALL CENTER | Facility: HOSPITAL | Age: 70
End: 2020-01-26

## 2020-01-26 NOTE — OUTREACH NOTE
Prep Survey      Responses   Facility patient discharged from?  [place]   Is patient eligible?  Yes   Discharge diagnosis  Lumbar fusion L3-5, Foraminotomy 1-3   Does the patient have one of the following disease processes/diagnoses(primary or secondary)?  General Surgery   Does the patient have Home health ordered?  Yes   What is the Home health agency?   [facility]  for SN/PT/OT/aide   Is there a DME ordered?  No   Prep survey completed?  Yes          Jannette Blackmon RN

## 2020-01-27 ENCOUNTER — TELEPHONE (OUTPATIENT)
Dept: NEUROSURGERY | Facility: CLINIC | Age: 70
End: 2020-01-27

## 2020-01-27 NOTE — TELEPHONE ENCOUNTER
Provider:  Pelon  Caller: Rhonda  Time of call: 11:56    Phone #:  [phone]  Surgery:  Lumbar fusion foraminotomy  Surgery Date:  01//20/20  Last visit:   same  Next visit: 02/04/20    KAUSHIK:         Reason for call:     Patient has not been bleeding however when she got up from her nap, and they removed her bandage for the first time, there was 1 drop of blood that hit the floor.     I called her back to see if the incision is still intact.  She said that it is. There is no redness, swelling or fever.  No more d/c.    Sutures are not scheduled to come out until 02/04/20.  She was under the impression that they would come out this week.  Please advise.

## 2020-01-28 ENCOUNTER — READMISSION MANAGEMENT (OUTPATIENT)
Dept: CALL CENTER | Facility: HOSPITAL | Age: 70
End: 2020-01-28

## 2020-01-28 NOTE — DISCHARGE SUMMARY
[facility]    Date of Admission: 1/20/2020  Date of Discharge:  1/25/2020    Discharge Diagnosis: Lumbar spondylolisthesis with stenosis and instability    Procedures Performed  Procedure(s):  LUMBAR FUSION L3-5 FORAMINOTOMY 1-3       Presenting Problem  Lumbar stenosis with neurogenic claudication [M48.062]  Lumbar stenosis with neurogenic claudication [M48.062]     History of Present Illness  Patient is a 69 y.o. 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  She recently presented to our service with low back and bilateral leg pain with walking and standing intolerances.  Preoperative studies demonstrated spondylolisthesis at L3-4 and L4-5 with evidence of instability.  As such patient presented for lumbar decompression, fusion, and stabilization on 1/20/2020.    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  This was closed primarily with 6-0 Prolene and covered with DuraGen.    Hospital Course     Over the course of her hospital stay, vitals remained stable and her post-op dressing was clean, dry and intact.  Motor and sensory function were found to be intact throughout.  She was ambulatory, voiding independently, and tolerated PO without associated nausea or vomiting. Pain was minimal and well controlled with oral medications at the time of discharge on 01/24/2020.     Condition on Discharge: Stable  Discharge to: Home    PATIENT SPECIFIC EDUCATION/PLAN:  1. Follow-up with Neurosurgery in 10-12 days for suture removal  2. No driving until follow-up.  3. No lifting greater than 10 lbs  4. The patient may get incision wet in the shower beginning on 1/26/2020.   5. NO tub bathing or swimming until follow-up  6. Ice pack to incision(s) as needed for associated pain or swelling     Discharge Medications     Discharge Medications      New Medications      Instructions Start Date   oxyCODONE-acetaminophen  7.5-325 MG per tablet  Commonly known as:  PERCOCET   1 tablet, Oral, 3 Times Daily PRN         Changes to Medications      Instructions Start Date   RHEUMATE capsule  What changed:    · how much to take  · how to take this  · when to take this   Take 1 capsule twice daily         Continue These Medications      Instructions Start Date   aspirin 81 MG EC tablet   81 mg, Oral, Daily      CALCIUM 600+D 600-200 MG-UNIT tablet  Generic drug:  Calcium Carbonate-Vitamin D   1 tablet, Oral, 2 Times Daily      CBD oral oil  Commonly known as:  cannabidiol   Oral, Daily      gabapentin 300 MG capsule  Commonly known as:  NEURONTIN   300 mg, Oral, 3 Times Daily PRN      ICAPS AREDS 2 PO   1 capsule, Oral, Nightly      lidocaine 5 %  Commonly known as:  LIDODERM   3 patches, Transdermal, Every 24 Hours, Remove & Discard patch within 12 hours or as directed by MD      meclizine 25 MG tablet  Commonly known as:  ANTIVERT   25 mg, Oral, 3 Times Daily PRN      omeprazole 20 MG capsule  Commonly known as:  priLOSEC   20 mg, Oral, Daily      sertraline 25 MG tablet  Commonly known as:  ZOLOFT   25 mg, Oral, Daily      tiZANidine 2 MG tablet  Commonly known as:  ZANAFLEX   2 mg, Oral, 3 Times Daily      traMADol 50 MG tablet  Commonly known as:  ULTRAM   50 mg, Oral, Every 6 Hours PRN             Follow-up Appointments  Future Appointments   Date Time Provider Department Center   2/4/2020  2:00 PM Jaqui Arango PA-C [facility] None     Additional Instructions for the Follow-ups that You Need to Schedule     Ambulatory Referral to Home Health   As directed      Face to Face Visit Date:  1/24/2020    Follow-up provider for Plan of Care?:  I will be treating the patient on an ongoing basis.  Please send me the Plan of Care for signature.    Follow-up provider:  SELINA SCHUSTER [[id number]]    Reason/Clinical Findings:  s/p lumbar stenosis    Describe mobility limitations that make leaving home difficult:  impaired functional mobility,  balance, gait, and endurance    Nursing/Therapeutic Services Requested:  Skilled Nursing (aide) Physical Therapy Occupational Therapy Other    Skilled nursing orders:  Medication education    PT orders:  Strengthening    Occupational orders:  Strengthening Activities of daily living         Discharge Follow-up with Specified Provider: [facility]; 2 Weeks   As directed      To:  Pelon    Follow Up:  2 Weeks    Follow Up Details:  Follow-up with physician's assistant in 10-12 days for suture removal               Referring Provider  MD ANGELINE Garcia Robert Ritchie, MD Sarah C Overmyer, PA-C  01/28/20  3:32 PM

## 2020-01-28 NOTE — OUTREACH NOTE
General Surgery Week 1 Survey      Responses   Facility patient discharged from?  [place]   Does the patient have one of the following disease processes/diagnoses(primary or secondary)?  General Surgery   Is there a successful TCM telephone encounter documented?  No   Week 1 attempt successful?  Yes   Call start time  1717   Call end time  1728   Discharge diagnosis  Lumbar fusion L3-5, Foraminotomy 1-3   Is patient permission given to speak with other caregiver?  Yes   List who call center can speak with  any one that answers the phone.    Meds reviewed with patient/caregiver?  Yes   Is the patient having any side effects they believe may be caused by any medication additions or changes?  No   Does the patient have all medications related to this admission filled (includes all antibiotics, pain medications, etc.)  Yes [She states she is not having much pain. ]   Is the patient taking all medications as directed (includes completed medication regime)?  Yes   Does the patient have a follow up appointment scheduled with their surgeon?  Yes [She sees her surgeon next Tuesday- 02/04/2020]   Has the patient kept scheduled appointments due by today?  N/A   Comments  -- [She is having light pink drainage to light yellow to white drainage form the incision site. She has spoke to her Surgeon, and has HH. She states it is less today. ]   What is the Home health agency?   [facility] for SN/PT/OT/aide   Has home health visited the patient within 72 hours of discharge?  Yes [Nurse has came, PT has not come, she is planning on calling the office in the am, She states she can call them, did not want me to call. She has home exercises she has been doing. ]   Has all DME been delivered?  Yes [walker ]   Psychosocial issues?  No   Did the patient receive a copy of their discharge instructions?  Yes   Nursing interventions  Reviewed instructions with patient   What is the patient's perception of their health status since discharge?   Improving   Nursing interventions  Nurse provided patient education, Advised patient to call provider [She will call the provider tomorrow about the PT. ]   Is the patient /caregiver able to teach back basic post-op care?  Continue use of incentive spirometry at least 1 week post discharge, Practice 'cough and deep breath', Drive as instructed by MD in discharge instructions, Take showers only when approved by MD-sponge bathe until then, No tub bath, swimming, or hot tub until instructed by MD, Keep incision areas clean,dry and protected, Do not remove steri-strips, Lifting as instructed by MD in discharge instructions   Is the patient/caregiver able to teach back signs and symptoms of incisional infection?  Increased redness, swelling or pain at the incisonal site, Increased drainage or bleeding, Incisional warmth, Pus or odor from incision, Fever   Is the patient/caregiver able to teach back steps to recovery at home?  Set small, achievable goals for return to baseline health, Rest and rebuild strength, gradually increase activity, Weigh daily, Practice good oral hygiene, Make a list of questions for surgeon's appointment, Eat a well-balance diet   Is the patient/caregiver able to teach back the hierarchy of who to call/visit for symptoms/problems? PCP, Specialist, Home health nurse, Urgent Care, ED, 911  Yes   Week 1 call completed?  Yes          Olive Mccray RN

## 2020-01-28 NOTE — TELEPHONE ENCOUNTER
Called and advised patients daughter Rhonda. Daughter verbalized understanding, and stated they would be here on the 4th for the suture removal.

## 2020-01-28 NOTE — TELEPHONE ENCOUNTER
As documented by Jaqui it looks like the patients current appointment is the time period requested.  If patient has additional problems please let me know and I will move up her appointment.

## 2020-01-29 ENCOUNTER — TELEPHONE (OUTPATIENT)
Dept: NEUROSURGERY | Facility: CLINIC | Age: 70
End: 2020-01-29

## 2020-01-29 NOTE — TELEPHONE ENCOUNTER
Please assure that she is drinking plenty of fluids (water, gatorade)  Any sign of infection? Fever, wound drainage etc?

## 2020-01-29 NOTE — TELEPHONE ENCOUNTER
Provider:  Pelon  Caller: Rhonda  Time of call:   1:36  Phone #:  [phone]  Surgery: Lumbar fusion L3-L5 foraminotomy   Surgery Date:  01/20/20  Last visit:   same  Next visit: 02/04/20    KAUSHIK:         Reason for call:   Rhonda called and said patients BP has been low.  It has been running 90/40, 84/40.  She is feeling better, but sweating a lot.       Anything to be done?

## 2020-01-29 NOTE — TELEPHONE ENCOUNTER
I spoke with patient's daughter and she is feeling much better now.  She did increase her fluids after she had her episode.  She has not had any other issues.  She does have some drainage, but it is normal post-operative drainage.  Home health looked at her Monday and said it looked good.    She is afebrile, incision intact and looks good.  There are no signs of infection. No B/B issues or saddle numbness.    Daughter notified to call us back if there are any other problems.

## 2020-02-04 ENCOUNTER — READMISSION MANAGEMENT (OUTPATIENT)
Dept: CALL CENTER | Facility: HOSPITAL | Age: 70
End: 2020-02-04

## 2020-02-04 ENCOUNTER — OFFICE VISIT (OUTPATIENT)
Dept: NEUROSURGERY | Facility: CLINIC | Age: 70
End: 2020-02-04

## 2020-02-04 VITALS
WEIGHT: 206 LBS | TEMPERATURE: 97.8 F | SYSTOLIC BLOOD PRESSURE: 132 MMHG | BODY MASS INDEX: 38.89 KG/M2 | HEIGHT: 61 IN | DIASTOLIC BLOOD PRESSURE: 64 MMHG

## 2020-02-04 DIAGNOSIS — Z98.1 S/P LUMBAR SPINAL FUSION: Primary | ICD-10-CM

## 2020-02-04 DIAGNOSIS — M48.062 SPINAL STENOSIS, LUMBAR REGION, WITH NEUROGENIC CLAUDICATION: ICD-10-CM

## 2020-02-04 DIAGNOSIS — L24.A9 WOUND DRAINAGE: ICD-10-CM

## 2020-02-04 PROCEDURE — 99024 POSTOP FOLLOW-UP VISIT: CPT | Performed by: PHYSICIAN ASSISTANT

## 2020-02-04 RX ORDER — SULFAMETHOXAZOLE AND TRIMETHOPRIM 800; 160 MG/1; MG/1
1 TABLET ORAL 2 TIMES DAILY
Qty: 14 TABLET | Refills: 0 | Status: SHIPPED | OUTPATIENT
Start: 2020-02-04 | End: 2020-03-25

## 2020-02-04 NOTE — OUTREACH NOTE
General Surgery Week 2 Survey      Responses   Facility patient discharged from?  [place]   Does the patient have one of the following disease processes/diagnoses(primary or secondary)?  General Surgery   Week 2 attempt successful?  Yes   Call start time  1708   Call end time  1714   Discharge diagnosis  Lumbar fusion L3-5, Foraminotomy 1-3   Person spoke with today (if not patient) and relationship  [place]   Meds reviewed with patient/caregiver?  Yes   Is the patient taking all medications as directed (includes completed medication regime)?  Yes   Medication comments  Pt was started on ABX 2/4/20   Does the patient have a follow up appointment scheduled with their surgeon?  Yes [2/4/20-2nd f/u with surgeon 2/14/20]   Has the patient kept scheduled appointments due by today?  Yes   What is the Home health agency?   [facility] for SN/PT/OT/aide   Psychosocial issues?  No   Nursing interventions  Educated on MyChart   What is the patient's perception of their health status since discharge?  Improving [Pt still has drainage. Sutures were not removed today. She will f/u with surgeon 2/14/20. ABX started today. May need inpatient for repair of leak. ]   Nursing interventions  Nurse provided patient education   Is the patient /caregiver able to teach back basic post-op care?  Keep incision areas clean,dry and protected   Is the patient/caregiver able to teach back signs and symptoms of incisional infection?  Pus or odor from incision, Fever   Is the patient/caregiver able to teach back steps to recovery at home?  Eat a well-balance diet   If the patient is a current smoker, are they able to teach back resources for cessation?  -- [Nonsmoker]   Week 2 call completed?  Yes   Wrap up additional comments  Daughter works at [employer]          Mylene Pitts RN

## 2020-02-04 NOTE — PROGRESS NOTES
Patient: Casandra Trinidad  : 1950  GENDER: female    Primary Care Provider: Tony Melton MD    Requesting Provider: As above      History    Chief Complaint: Bilateral lower extremity pain with sensory alteration     History of Present Illness: Ms. Trinidad is a 69 y.o. 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  She recently presented to our service with low back and bilateral leg pain with walking and standing intolerances.  Preoperative studies demonstrated spondylolisthesis at L3-4 and L4-5 with evidence of instability.  As such, patient presented for lumbar decompression, fusion, and stabilization on 2020.     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  This was closed primarily with 6-0 Prolene and covered with DuraGen.    Presently, Ms. Trinidad is 2 weeks postop.  She is in excellent spirits.  She states that her back and bilateral leg pain resolved.  She has been working with home health to build up her leg strength, and feels more comfortable using her walker for ambulatory assistance.  She has successfully weaned off of her postoperative pain medication.  She denies associated headache, however has been experiencing some serosanguineous drainage from the inferior pole of her incision.  She denies recent drainage for the past 24 hours.  Patient denies associated fever,night sweats/chills, or other constitutional evidence of infection.  She has no other complaints at this time.    Review of Systems   Constitutional: Positive for activity change. Negative for appetite change, chills, diaphoresis, fatigue, fever and unexpected weight change.   HENT: Negative for congestion, dental problem, drooling, ear discharge, ear pain, facial swelling, hearing loss, mouth sores, nosebleeds, postnasal drip, rhinorrhea, sinus pressure, sneezing, sore throat, tinnitus, trouble swallowing and voice change.    Eyes: Negative for photophobia,  "pain, discharge, redness, itching and visual disturbance.   Respiratory: Negative for apnea, cough, choking, chest tightness, shortness of breath, wheezing and stridor.    Cardiovascular: Negative for chest pain, palpitations and leg swelling.   Gastrointestinal: Positive for constipation. Negative for abdominal distention, abdominal pain, anal bleeding, blood in stool, diarrhea, nausea, rectal pain and vomiting.   Endocrine: Positive for polydipsia.   Musculoskeletal: Positive for arthralgias, back pain and gait problem. Negative for joint swelling, myalgias, neck pain and neck stiffness.   Skin: Negative for color change, pallor, rash and wound.   Allergic/Immunologic: Positive for environmental allergies. Negative for food allergies and immunocompromised state.   Neurological: Positive for dizziness, weakness and numbness. Negative for tremors, seizures, syncope, facial asymmetry, speech difficulty, light-headedness and headaches.   Hematological: Negative for adenopathy. Does not bruise/bleed easily.   Psychiatric/Behavioral: Positive for dysphoric mood. Negative for agitation, behavioral problems, confusion, decreased concentration, self-injury, sleep disturbance and suicidal ideas. The patient is not nervous/anxious and is not hyperactive.        The patient's past medical history, past surgical history, family history, and social history have been reviewed at length in the electronic medical record.    Physical Exam:   /64 (BP Location: Right arm, Patient Position: Sitting)   Temp 97.8 °F (36.6 °C) (Temporal)   Ht 154.9 cm (61\")   Wt 93.4 kg (206 lb)   LMP  (LMP Unknown) Comment: Mammogram Jan 28, 2019   BMI 38.92 kg/m²   Consitutional: A&Ox3, pleasant, no acute distress, denies headache  Skin:   - Well healing surgical incision (running-lock nylon sutures in place)  - No evidence of wound dehiscence  - NSOI   - Non-TTP  - inferior pole of incision with active serosanguineous drainage.    Medical " Decision Making      Diagnosis/Treatment Options:  1. S/P lumbar spinal fusion  2. Spinal stenosis, lumbar region, with neurogenic claudication  3. Wound drainage       Follow up:  Ms. Trinidad is seen today in follow-up 2 weeks after undergoing an uncomplicated lumbar fusion from L3-5 on 1/20/2020.  Intraoperatively we experienced a small dural rent in the axilla of her L4 nerve root.  Ms. Trinidad has been doing well with her recovery, and denies associated headache.  After assessing her surgical site, I have opted to leave her sutures in place for an additional week.  I redressed the inferior portion of her incision.  Patient is to track how many dressing changes she goes through on a daily basis.  If it continues to drain, we may need to consider exploration to rule out a spinal leak.  However given the lack of associated headache-this is unlikely.  Patient is to watch out for associated fever, purulent drainage, foul odor or other evidence of infection until seen back in the office.    Jaqui Arango PA-C   2/4/2020   4:05 PM

## 2020-02-11 ENCOUNTER — READMISSION MANAGEMENT (OUTPATIENT)
Dept: CALL CENTER | Facility: HOSPITAL | Age: 70
End: 2020-02-11

## 2020-02-11 NOTE — OUTREACH NOTE
General Surgery Week 3 Survey      Responses   Facility patient discharged from?  [facility]   Does the patient have one of the following disease processes/diagnoses(primary or secondary)?  General Surgery   Week 3 attempt successful?  Yes   Call start time  [phone]   Call end time  [phone]   Meds reviewed with patient/caregiver?  Yes   Is the patient taking all medications as directed (includes completed medication regime)?  Yes   Has the patient kept scheduled appointments due by today?  Yes   Comments  Still has sutures had spinal fluid leak will see  again this Friday may get sutures out, leak has stopped.    Comments  Leaking has stopped may get sutures out Friday of this week, healing well   What is the patient's perception of their health status since discharge?  Improving [leaking has stoppped, will get sutures out Friday hopefully]   Week 3 call completed?  Yes   Revoked  No further contact(revokes)-requires comment   Graduated/Revoked comments  She says is doing great, will call us if she needs us.           Page Hernandez, RN

## 2020-02-14 ENCOUNTER — OFFICE VISIT (OUTPATIENT)
Dept: NEUROSURGERY | Facility: CLINIC | Age: 70
End: 2020-02-14

## 2020-02-14 VITALS
DIASTOLIC BLOOD PRESSURE: 70 MMHG | TEMPERATURE: 98 F | BODY MASS INDEX: 39.08 KG/M2 | HEIGHT: 61 IN | WEIGHT: 207 LBS | SYSTOLIC BLOOD PRESSURE: 142 MMHG

## 2020-02-14 DIAGNOSIS — M48.062 SPINAL STENOSIS, LUMBAR REGION, WITH NEUROGENIC CLAUDICATION: ICD-10-CM

## 2020-02-14 DIAGNOSIS — Z98.1 S/P LUMBAR SPINAL FUSION: Primary | ICD-10-CM

## 2020-02-14 DIAGNOSIS — Z51.89 VISIT FOR WOUND CHECK: ICD-10-CM

## 2020-02-14 PROCEDURE — 99024 POSTOP FOLLOW-UP VISIT: CPT | Performed by: PHYSICIAN ASSISTANT

## 2020-02-14 NOTE — PROGRESS NOTES
Patient: Casandra Trinidad  : 1950  GENDER: female    Primary Care Provider: Tony Melton MD    Requesting Provider: As above      History    Chief Complaint: bilateral lower extremity pain with sensory alteration     History of Present Illness: Ms. 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  She recently presented to our service with low back and bilateral leg pain with walking and standing intolerances.  Preoperative studies demonstrated spondylolisthesis at L3-4 and L4-5 with evidence of instability.  As such, patient presented for lumbar decompression, fusion, and stabilization on 2020.     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  This was closed primarily with 6-0 Prolene and covered with DuraGen.     Presently, Ms. Trinidad is 3 weeks postop.  She continues to be thrilled with her postoperative progress.  Pt. Continues to use her walker for ambulatory assistance.  She denies recent drainage from her lumbar incison x 7 days.  Patient denies associated fever,night sweats/chills, or other constitutional evidence of infection.  She has no other complaints at this time.    Review of Systems   Constitutional: Positive for activity change. Negative for appetite change, chills, diaphoresis, fatigue, fever and unexpected weight change.   HENT: Negative for congestion, dental problem, drooling, ear discharge, ear pain, facial swelling, hearing loss, mouth sores, nosebleeds, postnasal drip, rhinorrhea, sinus pressure, sneezing, sore throat, tinnitus, trouble swallowing and voice change.    Eyes: Negative for photophobia, pain, discharge, redness, itching and visual disturbance.   Respiratory: Negative for apnea, cough, choking, chest tightness, shortness of breath, wheezing and stridor.    Cardiovascular: Negative for chest pain, palpitations and leg swelling.   Gastrointestinal: Positive for constipation.  "Negative for abdominal distention, abdominal pain, anal bleeding, blood in stool, diarrhea, nausea, rectal pain and vomiting.   Endocrine: Positive for polydipsia.   Musculoskeletal: Positive for arthralgias, back pain and gait problem. Negative for joint swelling, myalgias, neck pain and neck stiffness.   Skin: Negative for color change, pallor, rash and wound.   Allergic/Immunologic: Positive for environmental allergies. Negative for food allergies and immunocompromised state.   Neurological: Positive for dizziness, weakness and numbness. Negative for tremors, seizures, syncope, facial asymmetry, speech difficulty, light-headedness and headaches.   Hematological: Negative for adenopathy. Does not bruise/bleed easily.   Psychiatric/Behavioral: Positive for dysphoric mood. Negative for agitation, behavioral problems, confusion, decreased concentration, self-injury, sleep disturbance and suicidal ideas. The patient is not nervous/anxious and is not hyperactive.        The patient's past medical history, past surgical history, family history, and social history have been reviewed at length in the electronic medical record.    Physical Exam:   /70 (BP Location: Right arm, Patient Position: Sitting)   Temp 98 °F (36.7 °C) (Temporal)   Ht 154.9 cm (61\")   Wt 93.9 kg (207 lb)   LMP  (LMP Unknown) Comment: Mammogram Jan 28, 2019   BMI 39.11 kg/m²   Consitutional: A&Ox3, pleasant, no acute distress, denies headache  Skin:   - Well healing surgical incision   - No evidence of wound dehiscence  - NSOI   - Non-TTP    Medical Decision Making      Diagnosis/Treatment Options:  1. S/P lumbar spinal fusion  2. Spinal stenosis, lumbar region, with neurogenic claudication  3. Visit for wound check     - XR Spine Lumbar AP & Lateral    Follow up:  Ms. Trinidad is seen today in follow-up 3 weeks after undergoing an uncomplicated lumbar fusion from L3-5 on 1/20/2020.  Intraoperatively we experienced a small dural rent in the " axilla of her L4 nerve root.  Ms. Trinidad has been doing well with her recovery, and denies associated headache.  She is here today for suture removal.  Pt. Denies recent drainage from her surigical site x 7 days.  Sutures were removed.  Pt. Is to continue to work on her walking.  She will FU in the office with Dr. Bird in 5-6 weeks for reassessment with new plain films of her lumbar spine.      Jaqui Manning PA-C   2/14/2020   3:57 PM

## 2020-03-23 RX ORDER — OMEPRAZOLE 20 MG/1
CAPSULE, DELAYED RELEASE ORAL
Qty: 90 CAPSULE | Refills: 3 | Status: SHIPPED | OUTPATIENT
Start: 2020-03-23 | End: 2021-03-05 | Stop reason: SDUPTHER

## 2020-03-25 ENCOUNTER — OFFICE VISIT (OUTPATIENT)
Dept: NEUROSURGERY | Facility: CLINIC | Age: 70
End: 2020-03-25

## 2020-03-25 VITALS — HEIGHT: 61 IN | WEIGHT: 202.6 LBS | BODY MASS INDEX: 38.25 KG/M2 | TEMPERATURE: 96.5 F

## 2020-03-25 DIAGNOSIS — Z98.1 S/P LUMBAR SPINAL FUSION: Primary | ICD-10-CM

## 2020-03-25 DIAGNOSIS — M48.062 SPINAL STENOSIS, LUMBAR REGION, WITH NEUROGENIC CLAUDICATION: ICD-10-CM

## 2020-03-25 DIAGNOSIS — M43.16 SPONDYLOLISTHESIS OF LUMBAR REGION: ICD-10-CM

## 2020-03-25 PROCEDURE — 99024 POSTOP FOLLOW-UP VISIT: CPT | Performed by: NEUROLOGICAL SURGERY

## 2020-03-25 NOTE — PROGRESS NOTES
Patient: Casandra Trinidad  : 1950    Primary Care Provider: Tony Melton MD    Requesting Provider: As above        History    Chief Complaint: Bilateral lower extremity pain with sensory alteration.    History of Present Illness: Ms. 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  She recently presented to our service with low back and bilateral leg pain with walking and standing intolerances.  Preoperative studies demonstrated spondylolisthesis at L3-4 and L4-5 with evidence of instability.  As such, patient presented for lumbar decompression, fusion, and stabilization on 2020.  Surgery was complicated by a small dural rent in the axilla of the L4 nerve root.  She was kept at bedrest and then mobilized.  She is doing great.  She has been actively walking.  She has very little pain.  She is doing all of her housework now.  She is very pleased with her progress.  She reports no headaches.    Review of Systems   Constitutional: Positive for activity change. Negative for appetite change, chills, diaphoresis, fatigue, fever and unexpected weight change.   HENT: Negative for congestion, dental problem, drooling, ear discharge, ear pain, facial swelling, hearing loss, mouth sores, nosebleeds, postnasal drip, rhinorrhea, sinus pressure, sneezing, sore throat, tinnitus, trouble swallowing and voice change.    Eyes: Negative for photophobia, pain, discharge, redness, itching and visual disturbance.   Respiratory: Negative for apnea, cough, choking, chest tightness, shortness of breath, wheezing and stridor.    Cardiovascular: Negative for chest pain, palpitations and leg swelling.   Gastrointestinal: Positive for constipation. Negative for abdominal distention, abdominal pain, anal bleeding, blood in stool, diarrhea, nausea, rectal pain and vomiting.   Endocrine: Positive for polydipsia.   Musculoskeletal: Positive for arthralgias, back pain and gait problem.  "Negative for joint swelling, myalgias, neck pain and neck stiffness.   Skin: Negative for color change, pallor, rash and wound.   Allergic/Immunologic: Positive for environmental allergies. Negative for food allergies and immunocompromised state.   Neurological: Positive for dizziness, weakness and numbness. Negative for tremors, seizures, syncope, facial asymmetry, speech difficulty, light-headedness and headaches.   Hematological: Negative for adenopathy. Does not bruise/bleed easily.   Psychiatric/Behavioral: Positive for dysphoric mood. Negative for agitation, behavioral problems, confusion, decreased concentration, self-injury, sleep disturbance and suicidal ideas. The patient is not nervous/anxious and is not hyperactive.    All other systems reviewed and are negative.      The patient's past medical history, past surgical history, family history, and social history have been reviewed at length in the electronic medical record.    Physical Exam:   Temp 96.5 °F (35.8 °C) (Temporal)   Ht 154.9 cm (61\")   Wt 91.9 kg (202 lb 9.6 oz)   LMP  (LMP Unknown) Comment: Mammogram Jan 28, 2019   BMI 38.28 kg/m²   The patient moves about very comfortably.  Her lumbar incision looks great without any swelling, redness, or drainage.    Medical Decision Making    Diagnosis:   Lumbar spondylolisthesis and stenosis with instability status post L3-5 PLIF.    Treatment Options:   Ms. Trinidad is doing great.  She will follow-up in our clinic in several months with plain films of the lumbar spine.       Diagnosis Plan   1. S/P lumbar spinal fusion     2. Spinal stenosis, lumbar region, with neurogenic claudication     3. Spondylolisthesis of lumbar region         Scribed for Braulio Bird MD by Ivana Maher CMA on 03/25/2020 at 9:47 AM      I, Dr. Bird, personally performed the services described in the documentation, as scribed in my presence, and it is both accurate and complete.  "

## 2020-05-08 RX ORDER — MELOXICAM 15 MG/1
TABLET ORAL
Qty: 90 TABLET | Refills: 3 | OUTPATIENT
Start: 2020-05-08

## 2020-06-01 ENCOUNTER — OFFICE VISIT (OUTPATIENT)
Dept: FAMILY MEDICINE CLINIC | Facility: CLINIC | Age: 70
End: 2020-06-01

## 2020-06-01 VITALS
OXYGEN SATURATION: 95 % | HEIGHT: 61 IN | WEIGHT: 210 LBS | TEMPERATURE: 98.1 F | HEART RATE: 78 BPM | BODY MASS INDEX: 39.65 KG/M2

## 2020-06-01 DIAGNOSIS — M54.41 CHRONIC BILATERAL LOW BACK PAIN WITH BILATERAL SCIATICA: ICD-10-CM

## 2020-06-01 DIAGNOSIS — M51.36 DDD (DEGENERATIVE DISC DISEASE), LUMBAR: ICD-10-CM

## 2020-06-01 DIAGNOSIS — G89.29 CHRONIC BILATERAL LOW BACK PAIN WITH BILATERAL SCIATICA: ICD-10-CM

## 2020-06-01 DIAGNOSIS — M43.16 SPONDYLOLISTHESIS OF LUMBAR REGION: ICD-10-CM

## 2020-06-01 DIAGNOSIS — M48.062 LUMBAR STENOSIS WITH NEUROGENIC CLAUDICATION: ICD-10-CM

## 2020-06-01 DIAGNOSIS — M54.42 CHRONIC BILATERAL LOW BACK PAIN WITH BILATERAL SCIATICA: ICD-10-CM

## 2020-06-01 DIAGNOSIS — M15.9 GENERALIZED OSTEOARTHROSIS, INVOLVING MULTIPLE SITES: ICD-10-CM

## 2020-06-01 PROCEDURE — 99214 OFFICE O/P EST MOD 30 MIN: CPT | Performed by: FAMILY MEDICINE

## 2020-06-01 RX ORDER — MELOXICAM 15 MG/1
15 TABLET ORAL DAILY
COMMUNITY
End: 2020-06-01 | Stop reason: SDUPTHER

## 2020-06-01 RX ORDER — GABAPENTIN 300 MG/1
1 CAPSULE ORAL 3 TIMES DAILY
COMMUNITY
Start: 2020-05-01 | End: 2020-09-17 | Stop reason: SDUPTHER

## 2020-06-01 RX ORDER — MELOXICAM 15 MG/1
15 TABLET ORAL DAILY
Qty: 90 TABLET | Refills: 3 | Status: SHIPPED | OUTPATIENT
Start: 2020-06-01 | End: 2021-03-05 | Stop reason: SDUPTHER

## 2020-06-01 RX ORDER — TIZANIDINE 2 MG/1
2 TABLET ORAL 3 TIMES DAILY
Qty: 270 TABLET | Refills: 3 | Status: SHIPPED | OUTPATIENT
Start: 2020-06-01 | End: 2021-03-05 | Stop reason: SDUPTHER

## 2020-06-01 RX ORDER — TRAMADOL HYDROCHLORIDE 50 MG/1
50 TABLET ORAL EVERY 6 HOURS PRN
Qty: 360 TABLET | Refills: 1 | Status: SHIPPED | OUTPATIENT
Start: 2020-06-01 | End: 2021-03-05 | Stop reason: SDUPTHER

## 2020-06-01 NOTE — PROGRESS NOTES
Subjective   Casandra Trinidad is a 70 y.o. female    Chief Complaint    Chronic back pain  Chronic arthritic pain  Status post lumbar fusion January 2020  Neurogenic claudication    History of Present Illness  Patient presents today with chronic back pain, chronic pain associated with generalized osteoarthritis and neurogenic claudication.  Overall she is much improved since her lumbar fusion that was done in January 2020.  This was done by Dr. Hodges.  Patient reports that she is now able to stand and walk without severe pain.  She feels as if she has gotten her life back.  She reports that she was even doing power washing of her deck over the weekend.  She knows to be careful not to overdo things however.  She has no new or acute complaints today.  She is primarily due for refills on her analgesics and anti-inflammatory medications.  These will be forwarded to [facility].    The following portions of the patient's history were reviewed and updated as appropriate: allergies, current medications, past social history and problem list    Review of Systems   Constitutional: Negative for appetite change and fatigue.   HENT: Negative.    Eyes: Negative.    Respiratory: Negative.  Negative for chest tightness, shortness of breath and wheezing.    Cardiovascular: Negative for chest pain, palpitations and leg swelling.   Gastrointestinal: Negative.  Negative for abdominal pain, diarrhea and nausea.   Genitourinary: Negative.    Musculoskeletal: Positive for arthralgias, back pain and gait problem. Negative for myalgias.   Skin: Negative.    Neurological: Positive for weakness and numbness. Negative for dizziness, tremors, light-headedness and headaches.   Hematological: Negative for adenopathy. Does not bruise/bleed easily.   Psychiatric/Behavioral: Positive for sleep disturbance. Negative for agitation, behavioral problems, confusion, decreased concentration, dysphoric mood and suicidal ideas. The patient is not  nervous/anxious.        Objective     Vitals:    06/01/20 1101   Pulse: 78   Temp: 98.1 °F (36.7 °C)   SpO2: 95%       Physical Exam   Constitutional: She is oriented to person, place, and time. She appears well-developed and well-nourished.   HENT:   Head: Normocephalic and atraumatic.   Eyes: Pupils are equal, round, and reactive to light. Conjunctivae are normal.   Cardiovascular: Normal rate and regular rhythm.   Pulmonary/Chest: Effort normal and breath sounds normal.   Abdominal: Soft. Bowel sounds are normal.   Musculoskeletal: She exhibits no edema or deformity.        Lumbar back: She exhibits decreased range of motion, tenderness, bony tenderness and pain. She exhibits no swelling, no deformity and no spasm.   Neurological: She is alert and oriented to person, place, and time. She has normal reflexes.   Skin: Skin is warm and dry.   Psychiatric: She has a normal mood and affect. Her behavior is normal.   Nursing note and vitals reviewed.      Assessment/Plan   Problem List Items Addressed This Visit        Nervous and Auditory    Lumbar stenosis with neurogenic claudication    Relevant Medications    traMADol (ULTRAM) 50 MG tablet       Musculoskeletal and Integument    Generalized osteoarthrosis, involving multiple sites    Relevant Medications    traMADol (ULTRAM) 50 MG tablet    Spondylolisthesis of lumbar region    Relevant Medications    traMADol (ULTRAM) 50 MG tablet    DDD (degenerative disc disease), lumbar    Relevant Medications    traMADol (ULTRAM) 50 MG tablet      Other Visit Diagnoses     Chronic bilateral low back pain with bilateral sciatica        Relevant Medications    traMADol (ULTRAM) 50 MG tablet

## 2020-07-02 ENCOUNTER — OFFICE VISIT (OUTPATIENT)
Dept: FAMILY MEDICINE CLINIC | Facility: CLINIC | Age: 70
End: 2020-07-02

## 2020-07-02 VITALS
BODY MASS INDEX: 39.65 KG/M2 | SYSTOLIC BLOOD PRESSURE: 118 MMHG | OXYGEN SATURATION: 97 % | TEMPERATURE: 97.1 F | HEART RATE: 64 BPM | WEIGHT: 210 LBS | DIASTOLIC BLOOD PRESSURE: 80 MMHG | HEIGHT: 61 IN

## 2020-07-02 DIAGNOSIS — M75.82 TENDINITIS OF LEFT ROTATOR CUFF: Primary | ICD-10-CM

## 2020-07-02 PROCEDURE — 99213 OFFICE O/P EST LOW 20 MIN: CPT | Performed by: FAMILY MEDICINE

## 2020-07-02 RX ORDER — PREDNISONE 20 MG/1
TABLET ORAL
Qty: 21 TABLET | Refills: 0 | Status: SHIPPED | OUTPATIENT
Start: 2020-07-02 | End: 2021-01-05

## 2020-07-02 NOTE — PROGRESS NOTES
Subjective   Casandra Trinidad is a 70 y.o. female    Chief Complaint    Left shoulder pain    History of Present Illness  Patient presents today complaining of pain in the left shoulder.  No known injury.  Conservative measures at home have not helped.  Pain is primarily on hyperextension and not necessarily with abduction or abduction.  Has tried local treatment with heat and ice without relief.  Has also tried topical remedies without relief.  She is on tramadol for chronic back pain but it does not seem to help.  Also takes an anti-inflammatory daily.  No history of shoulder issues in the past.    The following portions of the patient's history were reviewed and updated as appropriate: allergies, current medications, past social history and problem list    Review of Systems   Constitutional: Negative.    Respiratory: Negative.    Cardiovascular: Negative.    Musculoskeletal: Positive for arthralgias and myalgias.   Neurological: Negative for weakness and numbness.   Hematological: Negative for adenopathy. Does not bruise/bleed easily.       Objective     Vitals:    07/02/20 0811   BP: 118/80   Pulse: 64   Temp: 97.1 °F (36.2 °C)   SpO2: 97%       Physical Exam   Constitutional: She is oriented to person, place, and time. She appears well-developed.   obese   HENT:   Head: Normocephalic and atraumatic.   Cardiovascular: Normal rate and regular rhythm.   Pulmonary/Chest: Effort normal and breath sounds normal.   Musculoskeletal:        Left shoulder: She exhibits decreased range of motion, tenderness, pain and decreased strength. She exhibits no swelling, no effusion, no crepitus, no deformity and no spasm.   Left shoulder reveals pain with extension.  Negative impingement sign. internal and external rotation without pain.   Neurological: She is alert and oriented to person, place, and time.   Vitals reviewed.      Assessment/Plan   Problem List Items Addressed This Visit     None      Visit Diagnoses     Tendinitis  of left rotator cuff    -  Primary    Relevant Medications    predniSONE (DELTASONE) 20 MG tablet    Lidocaine 1.8 % patch      Relative rest with gentle stretching as  symptoms are improved.

## 2020-07-27 ENCOUNTER — HOSPITAL ENCOUNTER (OUTPATIENT)
Dept: GENERAL RADIOLOGY | Facility: HOSPITAL | Age: 70
Discharge: HOME | End: 2020-07-27
Admitting: PHYSICIAN ASSISTANT

## 2020-07-27 PROCEDURE — 72100 X-RAY EXAM L-S SPINE 2/3 VWS: CPT

## 2020-07-29 ENCOUNTER — OFFICE VISIT (OUTPATIENT)
Dept: NEUROSURGERY | Facility: CLINIC | Age: 70
End: 2020-07-29

## 2020-07-29 VITALS
WEIGHT: 213.8 LBS | TEMPERATURE: 98.2 F | DIASTOLIC BLOOD PRESSURE: 82 MMHG | SYSTOLIC BLOOD PRESSURE: 126 MMHG | HEIGHT: 60 IN | BODY MASS INDEX: 41.98 KG/M2

## 2020-07-29 DIAGNOSIS — M48.062 SPINAL STENOSIS, LUMBAR REGION, WITH NEUROGENIC CLAUDICATION: Primary | ICD-10-CM

## 2020-07-29 DIAGNOSIS — M43.16 SPONDYLOLISTHESIS OF LUMBAR REGION: ICD-10-CM

## 2020-07-29 DIAGNOSIS — M47.817 LUMBOSACRAL SPONDYLOSIS WITHOUT MYELOPATHY: ICD-10-CM

## 2020-07-29 DIAGNOSIS — M43.10 ACQUIRED SPONDYLOLISTHESIS: ICD-10-CM

## 2020-07-29 PROCEDURE — 99213 OFFICE O/P EST LOW 20 MIN: CPT | Performed by: PHYSICIAN ASSISTANT

## 2020-07-29 NOTE — PROGRESS NOTES
"Patient: Casandra Trinidad  : 1950  Chart #: [id number]    Date of Service: 2020    CHIEF COMPLAINT: Bilateral lower extremity pain with sensory alteration    History of Present Illness Ms. Trinidad is seen in follow-up.  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  She presented to our clinic with low back and bilateral lower extremity pain exacerbated with walking and standing.  Her preoperative studies demonstrated spondylolisthesis at L3-4 and L4-5 with evidence of instability.  Ultimately on 2020 she underwent PLIF at these levels.  Surgery was complicated by a small dural rent.  Postoperatively she is done very well.  When last seen she had return to all her normal daily activities and duties.  She continues to do well today. Surgery has \"given her life back\".  She is increasingly active. She and her  enjoy fishing. She has some sensory alteration in her feet. Sometimes she has discomfort in the mid-back. Otherwise no complaints.       Past Medical History:   Diagnosis Date   • Acid reflux    • Age related cataract    • Arthritis    • Arthropathy, lower leg    • Dehiscence of incision    • Ear itch    • History of blood clots 1980    leg    • Myalgia and myositis    • Pain in joint, ankle and foot    • Pain in joint, hand    • Pain in joint, lower leg    • PONV (postoperative nausea and vomiting)    • Sleep apnea    • Tenosynovitis of finger    • Vertigo    • Vitamin D deficiency    • Wears glasses          Current Outpatient Medications:   •  aspirin 81 MG EC tablet, Take 81 mg by mouth daily., Disp: , Rfl:   •  Calcium Carbonate-Vitamin D (CALCIUM 600+D) 600-200 MG-UNIT tablet, Take 1 tablet by mouth 2 (two) times a day., Disp: , Rfl:   •  gabapentin (NEURONTIN) 300 MG capsule, Take 1 capsule by mouth 3 (Three) Times a Day., Disp: , Rfl:   •  Lidocaine 1.8 % patch, Apply 1 patch topically Every 12 (Twelve) Hours., Disp: 60 patch, Rfl: 5  •  " meclizine (ANTIVERT) 25 MG tablet, Take 1 tablet by mouth 3 (Three) Times a Day As Needed for dizziness., Disp: 90 tablet, Rfl: 3  •  meloxicam (MOBIC) 15 MG tablet, Take 1 tablet by mouth Daily., Disp: 90 tablet, Rfl: 3  •  Multiple Vitamins-Minerals (ICAPS AREDS 2 PO), Take 1 capsule by mouth every night., Disp: , Rfl:   •  omeprazole (priLOSEC) 20 MG capsule, TAKE 1 CAPSULE DAILY, Disp: 90 capsule, Rfl: 3  •  predniSONE (DELTASONE) 20 MG tablet, 2 tablets daily for 7 days then 1 tablet daily for 7 days, Disp: 21 tablet, Rfl: 0  •  sertraline (ZOLOFT) 25 MG tablet, Take 1 tablet by mouth Daily., Disp: 90 tablet, Rfl: 3  •  tiZANidine (ZANAFLEX) 2 MG tablet, Take 1 tablet by mouth 3 (Three) Times a Day., Disp: 270 tablet, Rfl: 3  •  traMADol (ULTRAM) 50 MG tablet, Take 1 tablet by mouth Every 6 (Six) Hours As Needed for Moderate Pain ., Disp: 360 tablet, Rfl: 1    Past Surgical History:   Procedure Laterality Date   • BREAST LUMPECTOMY Right 2014   • CATARACT EXTRACTION     •  SECTION      W/ TUBAL LIGATION   • CHOLECYSTECTOMY      LAP   • COLONOSCOPY     • HYSTERECTOMY      W/ SUPRAPUBIC CYSTOMY/RETROPUBIC URETHROTOMY/POSTERIOR AND ANTERIOR COLORRAPHY. Ovaries retained for urinary incontinence    • LAPAROSCOPIC GASTRIC BANDING  2007    W/ HHR ([facility])   • LUMBAR LAMINECTOMY WITH FUSION N/A 2020    Procedure: LUMBAR FUSION L3-5 FORAMINOTOMY 1-3;  Surgeon: Braulio Bird MD;  Location: [facility];  Service: Neurosurgery   • REPLACEMENT TOTAL KNEE BILATERAL     • TUBAL ABDOMINAL LIGATION         Social History     Socioeconomic History   • Marital status:      Spouse name: Not on file   • Number of children: Not on file   • Years of education: Not on file   • Highest education level: Not on file   Tobacco Use   • Smoking status: Never Smoker   • Smokeless tobacco: Never Used   Substance and Sexual Activity   • Alcohol use: No   • Drug use: No   • Sexual activity: Never  "        Review of Systems   HENT: Negative.    Genitourinary: Negative.    Musculoskeletal: Negative for back pain, neck pain and neck stiffness.   Neurological: Positive for numbness. Negative for weakness.   Psychiatric/Behavioral: Negative.    All other systems reviewed and are negative.      Objective   Vital Signs: Blood pressure 126/82, temperature 98.2 °F (36.8 °C), temperature source Infrared, height 152.4 cm (60\"), weight 97 kg (213 lb 12.8 oz), not currently breastfeeding.  Physical Exam   Constitutional: She appears well-developed and well-nourished. No distress.   HENT:   Head: Normocephalic and atraumatic.   Skin:   Lumbar incision well-healed   Psychiatric: She has a normal mood and affect. Her behavior is normal. Thought content normal.   Nursing note and vitals reviewed.  Musculoskeletal:  Strength is intact in upper and lower extremities to direct testing.  Station and gait are normal.  Neurologic:  Muscle tone is normal throughout.  Coordination is intact.  Sensation is intact to light touch throughout.  Patient is oriented to person, place, and time.    Independent review of radiographic imaging: Plain films of the lumbar spine demonstrate intact surgical construct L3-4 and L4-5 with good alignment.    Assessment/Plan   Diagnosis: Lumbar spondylolisthesis and stenosis with instability status post L3-5 PLIF.    Medical Decision Making: Ms. Trinidad is doing exceedingly well. She is so pleased with her progress.  She has returned to all normal activities and more. Occasionally she has some discomfort in the mid-back but she says nothing worth complaining of. I encouraged her to continue with daily exercise.  She will follow up in about 5-6 months with one more set of plain films of the lumbar spine.           Casandra was seen today for post-op.    Diagnoses and all orders for this visit:    Spinal stenosis, lumbar region, with neurogenic claudication  -     XR Spine Lumbar AP & Lateral; " Future    Spondylolisthesis of lumbar region  -     XR Spine Lumbar AP & Lateral; Future    Acquired spondylolisthesis  -     XR Spine Lumbar AP & Lateral; Future    Lumbosacral spondylosis without myelopathy  -     XR Spine Lumbar AP & Lateral; Future                 Patient's Body mass index is 41.75 kg/m². BMI is above normal parameters. Recommendations include: exercise counseling and nutrition counseling.         Carolyn Brothers PA-C  Patient Care Team:  Tony Melton MD as PCP - General (Family Medicine)  Tony Melton MD as PCP - [facility]  Sachin Schumacher MD PhD as Consulting Physician (Orthopedic Surgery)  Gagandeep Dc MD as Consulting Physician (Pain Medicine)  Olive Zhou APRN as Nurse Practitioner (Nurse Practitioner)  Cira Ortega MD as Consulting Physician (Obstetrics and Gynecology)

## 2020-09-16 DIAGNOSIS — G89.29 CHRONIC BILATERAL LOW BACK PAIN WITH BILATERAL SCIATICA: ICD-10-CM

## 2020-09-16 DIAGNOSIS — M48.062 LUMBAR STENOSIS WITH NEUROGENIC CLAUDICATION: ICD-10-CM

## 2020-09-16 DIAGNOSIS — M15.9 GENERALIZED OSTEOARTHROSIS, INVOLVING MULTIPLE SITES: ICD-10-CM

## 2020-09-16 DIAGNOSIS — M54.42 CHRONIC BILATERAL LOW BACK PAIN WITH BILATERAL SCIATICA: ICD-10-CM

## 2020-09-16 DIAGNOSIS — M51.36 DDD (DEGENERATIVE DISC DISEASE), LUMBAR: ICD-10-CM

## 2020-09-16 DIAGNOSIS — M54.41 CHRONIC BILATERAL LOW BACK PAIN WITH BILATERAL SCIATICA: ICD-10-CM

## 2020-09-16 DIAGNOSIS — M43.16 SPONDYLOLISTHESIS OF LUMBAR REGION: ICD-10-CM

## 2020-09-16 RX ORDER — GABAPENTIN 300 MG/1
300 CAPSULE ORAL 3 TIMES DAILY
Status: CANCELLED | OUTPATIENT
Start: 2020-09-16

## 2020-09-16 NOTE — TELEPHONE ENCOUNTER
Caller: Casandra Trinidad    Relationship: Self    Best call back number: [phone]    Medication needed:   Requested Prescriptions     Pending Prescriptions Disp Refills   • gabapentin (NEURONTIN) 300 MG capsule        Sig: Take 1 capsule by mouth 3 (Three) Times a Day.       When do you need the refill by: 09/21/2020    What details did the patient provide when requesting the medication: PT HAS A WEEK LEFT AND WOULD LIKE A 90 DAY SUPPLY    Does the patient have less than a 3 day supply:  [] Yes  [x] No    What is the patient's preferred pharmacy:    [facility]

## 2020-09-17 DIAGNOSIS — M15.9 GENERALIZED OSTEOARTHROSIS, INVOLVING MULTIPLE SITES: ICD-10-CM

## 2020-09-17 DIAGNOSIS — M54.42 CHRONIC BILATERAL LOW BACK PAIN WITH BILATERAL SCIATICA: ICD-10-CM

## 2020-09-17 DIAGNOSIS — M48.062 LUMBAR STENOSIS WITH NEUROGENIC CLAUDICATION: ICD-10-CM

## 2020-09-17 DIAGNOSIS — M43.16 SPONDYLOLISTHESIS OF LUMBAR REGION: ICD-10-CM

## 2020-09-17 DIAGNOSIS — M54.41 CHRONIC BILATERAL LOW BACK PAIN WITH BILATERAL SCIATICA: ICD-10-CM

## 2020-09-17 DIAGNOSIS — G89.29 CHRONIC BILATERAL LOW BACK PAIN WITH BILATERAL SCIATICA: ICD-10-CM

## 2020-09-17 DIAGNOSIS — M51.36 DDD (DEGENERATIVE DISC DISEASE), LUMBAR: ICD-10-CM

## 2020-09-17 RX ORDER — GABAPENTIN 300 MG/1
300 CAPSULE ORAL 3 TIMES DAILY
Qty: 270 CAPSULE | Refills: 1 | Status: SHIPPED | OUTPATIENT
Start: 2020-09-17 | End: 2021-03-05 | Stop reason: SDUPTHER

## 2020-11-18 ENCOUNTER — TRANSCRIBE ORDERS (OUTPATIENT)
Dept: FAMILY MEDICINE CLINIC | Facility: CLINIC | Age: 70
End: 2020-11-18

## 2020-11-18 DIAGNOSIS — R92.1 BREAST CALCIFICATION, LEFT: Primary | ICD-10-CM

## 2020-11-20 ENCOUNTER — TELEPHONE (OUTPATIENT)
Dept: FAMILY MEDICINE CLINIC | Facility: CLINIC | Age: 70
End: 2020-11-20

## 2020-11-20 NOTE — TELEPHONE ENCOUNTER
[facility] CALLING AGAIN TO REQUEST AN ORDER FOR A LEFT DIAGNOSTIC MMG.    APURVA CAN BE REACHED @ #: [phone].    FAX #: [phone].    CALLED ON:  11- @ 3:20 PM, ALSO.

## 2020-11-23 ENCOUNTER — TELEPHONE (OUTPATIENT)
Dept: FAMILY MEDICINE CLINIC | Facility: CLINIC | Age: 70
End: 2020-11-23

## 2020-11-23 NOTE — TELEPHONE ENCOUNTER
JO WITH [facility] CALLED BACK TO STATE THE PATIENT'S APPOINTMENT IS IN THE MORNING, 11/24/2020 AND SHE IS REQUESTING A FAX OF THE ORDERS PLEASE.  ANY QUESTIONS, PLEASE CALL JO [phone]

## 2020-11-23 NOTE — TELEPHONE ENCOUNTER
[facility] IS CALLING.  THEY NEED AN ORDER FOR THE PATIENT TO HAVE A DX MAMMOGRAM LEFT BREAST.  PT'S APPT IS TOMORROW.   WANTS THE ORDERS WITHIN AN HOUR.    FAX [phone]

## 2020-12-28 DIAGNOSIS — R42 DIZZINESS: ICD-10-CM

## 2020-12-28 NOTE — TELEPHONE ENCOUNTER
Caller: Casandra Trinidad    Relationship: Self    Best call back number: [phone]     Medication needed:   Requested Prescriptions     Pending Prescriptions Disp Refills   • meclizine (ANTIVERT) 25 MG tablet 90 tablet 3     Sig: Take 1 tablet by mouth 3 (Three) Times a Day As Needed for Dizziness.       When do you need the refill by: 01/04/21    What details did the patient provide when requesting the medication: PATIENT HAS ENOUGH FOR A WEEK     Does the patient have less than a 3 day supply:  [] Yes  [x] No    What is the patient's preferred pharmacy: [facility] - [facility], [address] [phone] [facility] [phone]

## 2020-12-29 ENCOUNTER — HOSPITAL ENCOUNTER (OUTPATIENT)
Dept: GENERAL RADIOLOGY | Facility: HOSPITAL | Age: 70
Discharge: HOME | End: 2020-12-29
Admitting: PHYSICIAN ASSISTANT

## 2020-12-29 DIAGNOSIS — M48.062 SPINAL STENOSIS, LUMBAR REGION, WITH NEUROGENIC CLAUDICATION: ICD-10-CM

## 2020-12-29 DIAGNOSIS — M43.10 ACQUIRED SPONDYLOLISTHESIS: ICD-10-CM

## 2020-12-29 DIAGNOSIS — M43.16 SPONDYLOLISTHESIS OF LUMBAR REGION: ICD-10-CM

## 2020-12-29 DIAGNOSIS — M47.817 LUMBOSACRAL SPONDYLOSIS WITHOUT MYELOPATHY: ICD-10-CM

## 2020-12-29 PROCEDURE — 72100 X-RAY EXAM L-S SPINE 2/3 VWS: CPT

## 2020-12-30 RX ORDER — MECLIZINE HYDROCHLORIDE 25 MG/1
25 TABLET ORAL 3 TIMES DAILY PRN
Qty: 90 TABLET | Refills: 3 | Status: SHIPPED | OUTPATIENT
Start: 2020-12-30 | End: 2022-06-29 | Stop reason: SDUPTHER

## 2021-01-04 RX ORDER — SERTRALINE HYDROCHLORIDE 25 MG/1
TABLET, FILM COATED ORAL
Qty: 90 TABLET | Refills: 3 | Status: SHIPPED | OUTPATIENT
Start: 2021-01-04 | End: 2021-03-05 | Stop reason: SDUPTHER

## 2021-01-05 ENCOUNTER — OFFICE VISIT (OUTPATIENT)
Dept: NEUROSURGERY | Facility: CLINIC | Age: 71
End: 2021-01-05

## 2021-01-05 VITALS
DIASTOLIC BLOOD PRESSURE: 70 MMHG | HEIGHT: 60 IN | WEIGHT: 220 LBS | BODY MASS INDEX: 43.19 KG/M2 | TEMPERATURE: 97 F | SYSTOLIC BLOOD PRESSURE: 130 MMHG

## 2021-01-05 DIAGNOSIS — M48.062 SPINAL STENOSIS, LUMBAR REGION, WITH NEUROGENIC CLAUDICATION: Primary | ICD-10-CM

## 2021-01-05 DIAGNOSIS — M43.16 SPONDYLOLISTHESIS OF LUMBAR REGION: ICD-10-CM

## 2021-01-05 DIAGNOSIS — Z98.1 S/P LUMBAR SPINAL FUSION: ICD-10-CM

## 2021-01-05 DIAGNOSIS — E66.01 OBESITY, CLASS III, BMI 40-49.9 (MORBID OBESITY) (HCC): ICD-10-CM

## 2021-01-05 DIAGNOSIS — M47.817 LUMBOSACRAL SPONDYLOSIS WITHOUT MYELOPATHY: ICD-10-CM

## 2021-01-05 PROCEDURE — 99213 OFFICE O/P EST LOW 20 MIN: CPT | Performed by: PHYSICIAN ASSISTANT

## 2021-01-05 NOTE — PROGRESS NOTES
"Patient: Casandra Trinidad  : 1950  Chart #: [id number]    Date of Service: 2021    CHIEF COMPLAINT: Bilateral lower extremity pain with sensory alteration    History of Present Illness Ms. Trinidad is seen in follow-up.  She is a 70-year-old retired woman who is well-known to our clinic.  Her history is significant for low back and bilateral lower extremity pain with walking and standing intolerance.  She was found to have a spondylolisthesis at L3-4 and L4-5 with evidence of instability.  Ultimately on 2020 she underwent PLIF at these levels.  Surgery was complicated by a small dural rent.  Patient has done very well.  She has returned to her normal activities.  Once again she mentioned surgery has \"given her life back\".  She does have some ongoing sensory alteration in her feet bilaterally.  Occasionally she has some mechanical pain but overall she is doing quite well.      Past Medical History:   Diagnosis Date   • Acid reflux    • Age related cataract    • Arthritis    • Arthropathy, lower leg    • Dehiscence of incision    • Ear itch    • History of blood clots 1980    leg    • Myalgia and myositis    • Pain in joint, ankle and foot    • Pain in joint, hand    • Pain in joint, lower leg    • PONV (postoperative nausea and vomiting)    • Sleep apnea    • Tenosynovitis of finger    • Vertigo    • Vitamin D deficiency    • Wears glasses          Current Outpatient Medications:   •  aspirin 81 MG EC tablet, Take 81 mg by mouth daily., Disp: , Rfl:   •  Calcium Carbonate-Vitamin D (CALCIUM 600+D) 600-200 MG-UNIT tablet, Take 1 tablet by mouth 2 (two) times a day., Disp: , Rfl:   •  gabapentin (NEURONTIN) 300 MG capsule, Take 1 capsule by mouth 3 (Three) Times a Day., Disp: 270 capsule, Rfl: 1  •  Lidocaine 1.8 % patch, Apply 1 patch topically Every 12 (Twelve) Hours., Disp: 60 patch, Rfl: 5  •  meclizine (ANTIVERT) 25 MG tablet, Take 1 tablet by mouth 3 (Three) Times a Day As Needed for " Dizziness., Disp: 90 tablet, Rfl: 3  •  meloxicam (MOBIC) 15 MG tablet, Take 1 tablet by mouth Daily., Disp: 90 tablet, Rfl: 3  •  Multiple Vitamins-Minerals (ICAPS AREDS 2 PO), Take 1 capsule by mouth every night., Disp: , Rfl:   •  omeprazole (priLOSEC) 20 MG capsule, TAKE 1 CAPSULE DAILY, Disp: 90 capsule, Rfl: 3  •  sertraline (ZOLOFT) 25 MG tablet, TAKE 1 TABLET DAILY, Disp: 90 tablet, Rfl: 3  •  tiZANidine (ZANAFLEX) 2 MG tablet, Take 1 tablet by mouth 3 (Three) Times a Day., Disp: 270 tablet, Rfl: 3  •  traMADol (ULTRAM) 50 MG tablet, Take 1 tablet by mouth Every 6 (Six) Hours As Needed for Moderate Pain ., Disp: 360 tablet, Rfl: 1    Past Surgical History:   Procedure Laterality Date   • BREAST LUMPECTOMY Right 2014   • CATARACT EXTRACTION     •  SECTION      W/ TUBAL LIGATION   • CHOLECYSTECTOMY      LAP   • COLONOSCOPY     • HYSTERECTOMY      W/ SUPRAPUBIC CYSTOMY/RETROPUBIC URETHROTOMY/POSTERIOR AND ANTERIOR COLORRAPHY. Ovaries retained for urinary incontinence    • LAPAROSCOPIC GASTRIC BANDING  2007    W/ HHR ([facility])   • LUMBAR LAMINECTOMY WITH FUSION N/A 2020    Procedure: LUMBAR FUSION L3-5 FORAMINOTOMY 1-3;  Surgeon: Braulio Bird MD;  [facility];  Service: Neurosurgery   • REPLACEMENT TOTAL KNEE BILATERAL     • TUBAL ABDOMINAL LIGATION         Social History     Socioeconomic History   • Marital status:      Spouse name: Not on file   • Number of children: Not on file   • Years of education: Not on file   • Highest education level: Not on file   Tobacco Use   • Smoking status: Never Smoker   • Smokeless tobacco: Never Used   Substance and Sexual Activity   • Alcohol use: No   • Drug use: No   • Sexual activity: Never         Review of Systems   Constitutional: Negative for activity change, appetite change, chills, diaphoresis, fatigue, fever and unexpected weight change.   HENT: Negative for congestion, dental problem, drooling, ear  discharge, ear pain, facial swelling, hearing loss, mouth sores, nosebleeds, postnasal drip, rhinorrhea, sinus pressure, sinus pain, sneezing, sore throat, tinnitus, trouble swallowing and voice change.    Eyes: Negative for photophobia, pain, discharge, redness, itching and visual disturbance.   Respiratory: Negative for apnea, cough, choking, chest tightness, shortness of breath, wheezing and stridor.    Cardiovascular: Negative for chest pain, palpitations and leg swelling.   Gastrointestinal: Negative for abdominal distention, abdominal pain, anal bleeding, blood in stool, constipation, diarrhea, nausea, rectal pain and vomiting.   Endocrine: Negative for cold intolerance, heat intolerance, polydipsia, polyphagia and polyuria.   Genitourinary: Negative for decreased urine volume, difficulty urinating, dyspareunia, dysuria, enuresis, flank pain, frequency, genital sores, hematuria, menstrual problem, pelvic pain, urgency, vaginal bleeding, vaginal discharge and vaginal pain.   Musculoskeletal: Positive for arthralgias and back pain. Negative for gait problem, joint swelling, myalgias, neck pain and neck stiffness.   Skin: Negative for color change, pallor, rash and wound.   Allergic/Immunologic: Negative for environmental allergies, food allergies and immunocompromised state.   Neurological: Positive for dizziness, weakness and numbness. Negative for tremors, seizures, syncope, facial asymmetry, speech difficulty, light-headedness and headaches.   Hematological: Negative for adenopathy. Does not bruise/bleed easily.   Psychiatric/Behavioral: Negative for agitation, behavioral problems, confusion, decreased concentration, dysphoric mood, hallucinations, self-injury, sleep disturbance and suicidal ideas. The patient is not nervous/anxious and is not hyperactive.    All other systems reviewed and are negative.      Objective   Vital Signs: Blood pressure 130/70, temperature 97 °F (36.1 °C), temperature source  "Infrared, height 152.4 cm (60\"), weight 99.8 kg (220 lb), not currently breastfeeding.  Physical Exam  Vitals signs and nursing note reviewed.   Constitutional:       General: She is not in acute distress.     Appearance: She is well-developed.   HENT:      Head: Normocephalic and atraumatic.   Eyes:      Pupils: Pupils are equal, round, and reactive to light.   Cardiovascular:      Heart sounds: Normal heart sounds.   Pulmonary:      Breath sounds: Normal breath sounds.   Psychiatric:         Behavior: Behavior normal.         Thought Content: Thought content normal.     Musculoskeletal:     Strength is intact in upper and lower extremities to direct testing.     Ambulates a bit lurched forward and I have counseled her on walking more upright.  She is using a walking cane given some recent vertigo.  Neurologic:     Muscle tone is normal throughout.     Coordination is intact.     Sensory alteration in the feet     Patient is oriented to person, place, and time.    Independent review of radiographic imaging: Plain films of the lumbar spine demonstrate intact surgical construct L3-5 without evidence of failure.  Space collapse and retrolisthesis noted at the level above.    Assessment/Plan   Diagnosis: Lumbar spondylolisthesis and stenosis with instability status post PLIF L3-5, 01/20/2020    Medical Decision Making: Ms. Trinidad continues to do very well.  She has no complaints today.  She is increasingly active.  We have discussed working on some weight loss.  She will follow-up in our clinic on as-needed basis.        Diagnoses and all orders for this visit:    1. Spinal stenosis, lumbar region, with neurogenic claudication (Primary)    2. Spondylolisthesis of lumbar region    3. Lumbosacral spondylosis without myelopathy    4. S/P lumbar spinal fusion    5. Obesity, Class III, BMI 40-49.9 (morbid obesity) (CMS/[facility])                        Patient's Body mass index is 42.97 kg/m². BMI is above normal parameters. " Recommendations include: exercise counseling and nutrition counseling.         Carolyn Brothers PA-C  Patient Care Team:  Tony Melton MD as PCP - General (Family Medicine)  Sachin Schumacher MD PhD as Consulting Physician (Orthopedic Surgery)  Gagandeep Dc MD as Consulting Physician (Pain Medicine)  Olive Zhou APRN as Nurse Practitioner (Nurse Practitioner)  Cira Ortega MD as Consulting Physician (Obstetrics and Gynecology)  Tony Melton MD as Referring Physician (Family Medicine)

## 2021-03-05 ENCOUNTER — OFFICE VISIT (OUTPATIENT)
Dept: FAMILY MEDICINE CLINIC | Facility: CLINIC | Age: 71
End: 2021-03-05

## 2021-03-05 VITALS
HEIGHT: 60 IN | HEART RATE: 76 BPM | OXYGEN SATURATION: 98 % | DIASTOLIC BLOOD PRESSURE: 88 MMHG | RESPIRATION RATE: 16 BRPM | BODY MASS INDEX: 43.39 KG/M2 | SYSTOLIC BLOOD PRESSURE: 128 MMHG | WEIGHT: 221 LBS

## 2021-03-05 DIAGNOSIS — M51.36 DDD (DEGENERATIVE DISC DISEASE), LUMBAR: ICD-10-CM

## 2021-03-05 DIAGNOSIS — M48.062 LUMBAR STENOSIS WITH NEUROGENIC CLAUDICATION: ICD-10-CM

## 2021-03-05 DIAGNOSIS — E66.01 MORBID OBESITY DUE TO EXCESS CALORIES (HCC): Primary | ICD-10-CM

## 2021-03-05 DIAGNOSIS — M54.42 CHRONIC BILATERAL LOW BACK PAIN WITH BILATERAL SCIATICA: ICD-10-CM

## 2021-03-05 DIAGNOSIS — G89.29 CHRONIC BILATERAL LOW BACK PAIN WITH BILATERAL SCIATICA: ICD-10-CM

## 2021-03-05 DIAGNOSIS — M54.41 CHRONIC BILATERAL LOW BACK PAIN WITH BILATERAL SCIATICA: ICD-10-CM

## 2021-03-05 DIAGNOSIS — M15.9 GENERALIZED OSTEOARTHROSIS, INVOLVING MULTIPLE SITES: ICD-10-CM

## 2021-03-05 DIAGNOSIS — M43.16 SPONDYLOLISTHESIS OF LUMBAR REGION: ICD-10-CM

## 2021-03-05 PROCEDURE — 99214 OFFICE O/P EST MOD 30 MIN: CPT | Performed by: FAMILY MEDICINE

## 2021-03-05 RX ORDER — SERTRALINE HYDROCHLORIDE 25 MG/1
25 TABLET, FILM COATED ORAL DAILY
Qty: 90 TABLET | Refills: 3 | Status: SHIPPED | OUTPATIENT
Start: 2021-03-05 | End: 2022-01-19 | Stop reason: SDUPTHER

## 2021-03-05 RX ORDER — OMEPRAZOLE 20 MG/1
20 CAPSULE, DELAYED RELEASE ORAL DAILY
Qty: 90 CAPSULE | Refills: 3 | Status: SHIPPED | OUTPATIENT
Start: 2021-03-05 | End: 2022-01-19 | Stop reason: SDUPTHER

## 2021-03-05 RX ORDER — TRAMADOL HYDROCHLORIDE 50 MG/1
50 TABLET ORAL EVERY 6 HOURS PRN
Qty: 360 TABLET | Refills: 1 | Status: SHIPPED | OUTPATIENT
Start: 2021-03-05 | End: 2021-03-08 | Stop reason: SDUPTHER

## 2021-03-05 RX ORDER — GABAPENTIN 300 MG/1
300 CAPSULE ORAL 3 TIMES DAILY
Qty: 270 CAPSULE | Refills: 1 | Status: SHIPPED | OUTPATIENT
Start: 2021-03-05 | End: 2022-01-19 | Stop reason: SDUPTHER

## 2021-03-05 RX ORDER — MELOXICAM 15 MG/1
15 TABLET ORAL DAILY
Qty: 90 TABLET | Refills: 3 | Status: SHIPPED | OUTPATIENT
Start: 2021-03-05 | End: 2022-01-19 | Stop reason: SDUPTHER

## 2021-03-05 RX ORDER — TIZANIDINE 2 MG/1
2 TABLET ORAL 3 TIMES DAILY
Qty: 270 TABLET | Refills: 3 | Status: SHIPPED | OUTPATIENT
Start: 2021-03-05 | End: 2022-05-09

## 2021-03-05 NOTE — PROGRESS NOTES
"Subjective   Casandra Trinidad is a 70 y.o. female    Chief Complaint    Chronic back pain.  Lumbar stenosis with claudication.  Spondylolisthesis.  Degenerative disc disease.  Generalized osteoarthritis.  Morbid obesity.    History of Present Illness  The patient presents today for a follow-up of the above noted back issues. She feels that she is moving better since her previous surgery. She needs multiple medication refills sent to her mail order pharmacy.    She reports falling several months ago and had a small \"ball\" that floats around on the tip of her finger that occurred the day after her fall. She denies any pain or it being bothersome.    She states she is having difficulty with her short-term memory. She denies getting lost when she is driving, but has difficulty remembering her grandchildren's names at times.    She is having shoulder pain. She denies it is severe pain, but it is \"there.\" She had severe pain in her small finger on her right hand last week that caused her to be unable to  a glass. She denies any numbness and the pain lasted for approximately 3-4 days. She reports it is better now.    She has 3 teenage grandchildren currently living with her. She reports that 2 days ago she was walking around with them and then went to sit down. Then when she tried to return to standing, she was unable to ambulate due to severe pain in the hamstring tendon. She reports the pain is better today.    She states she is having tightness superior to her bilateral ankles and her toes are numb post having back surgery. She is unable to tell if she has socks on due to the numbness in her toes.    She reports having bright red bleeding from her rectum approximately every 4 to 5 months that occurs post having a bowel movement. She believes it is her hemorrhoids and denies having blood in the stool. She does not want to have a colonoscopy until the pandemic is over.    She has been having cramping and spasms in " her bilateral upper and lower extremities. She is currently taking tizanidine. Her blood pressure was 128/78 mmHg today.    The following portions of the patient's history were reviewed and updated as appropriate: allergies, current medications, past social history and problem list    Review of Systems   Constitutional: Negative for appetite change and fatigue.   HENT: Negative.    Eyes: Negative.    Respiratory: Negative.  Negative for chest tightness, shortness of breath and wheezing.    Cardiovascular: Negative for chest pain, palpitations and leg swelling.   Gastrointestinal: Negative.  Negative for abdominal pain, diarrhea and nausea.   Genitourinary: Negative.    Musculoskeletal: Positive for arthralgias, back pain and gait problem. Negative for myalgias.   Skin: Negative.    Neurological: Positive for weakness and numbness. Negative for dizziness, tremors, light-headedness and headaches.   Hematological: Negative for adenopathy. Does not bruise/bleed easily.   Psychiatric/Behavioral: Positive for sleep disturbance. Negative for agitation, behavioral problems, confusion, decreased concentration, dysphoric mood and suicidal ideas. The patient is not nervous/anxious.        Objective     Vitals:    03/05/21 0836   BP: 128/88   Pulse: 76   Resp: 16   SpO2: 98%       Physical Exam  Vitals signs and nursing note reviewed.   Constitutional:       Appearance: She is well-developed. She is obese.   HENT:      Right Ear: Tympanic membrane and ear canal normal.      Left Ear: Tympanic membrane and ear canal normal.   Eyes:      Conjunctiva/sclera: Conjunctivae normal.      Pupils: Pupils are equal, round, and reactive to light.   Neck:      Vascular: No JVD.   Cardiovascular:      Rate and Rhythm: Normal rate and regular rhythm.      Pulses: Normal pulses.      Heart sounds: Normal heart sounds. No murmur.   Pulmonary:      Effort: Pulmonary effort is normal. No respiratory distress.      Breath sounds: Normal breath  sounds.   Abdominal:      General: Bowel sounds are normal.      Palpations: Abdomen is soft.      Tenderness: There is no abdominal tenderness.   Musculoskeletal:      Lumbar back: She exhibits decreased range of motion, tenderness, bony tenderness and pain. She exhibits no swelling, no deformity and no spasm.      Right lower leg: Edema present.      Left lower leg: Edema present.   Skin:     General: Skin is warm and dry.   Neurological:      General: No focal deficit present.      Mental Status: She is alert and oriented to person, place, and time.      Deep Tendon Reflexes: Reflexes are normal and symmetric.   Psychiatric:         Mood and Affect: Mood normal.         Behavior: Behavior normal.         Assessment/Plan   Problems Addressed this Visit        Endocrine and Metabolic    Morbid obesity due to excess calories (CMS/HCC) - Primary       Musculoskeletal and Injuries    Generalized osteoarthrosis, involving multiple sites    Relevant Medications    gabapentin (NEURONTIN) 300 MG capsule    meloxicam (MOBIC) 15 MG tablet    tiZANidine (ZANAFLEX) 2 MG tablet    traMADol (ULTRAM) 50 MG tablet    Spondylolisthesis of lumbar region    Relevant Medications    gabapentin (NEURONTIN) 300 MG capsule    meloxicam (MOBIC) 15 MG tablet    tiZANidine (ZANAFLEX) 2 MG tablet    traMADol (ULTRAM) 50 MG tablet       Neuro    Lumbar stenosis with neurogenic claudication    Relevant Medications    gabapentin (NEURONTIN) 300 MG capsule    meloxicam (MOBIC) 15 MG tablet    tiZANidine (ZANAFLEX) 2 MG tablet    traMADol (ULTRAM) 50 MG tablet    DDD (degenerative disc disease), lumbar    Relevant Medications    gabapentin (NEURONTIN) 300 MG capsule    meloxicam (MOBIC) 15 MG tablet    tiZANidine (ZANAFLEX) 2 MG tablet    traMADol (ULTRAM) 50 MG tablet      Other Visit Diagnoses     Chronic bilateral low back pain with bilateral sciatica        Relevant Medications    gabapentin (NEURONTIN) 300 MG capsule    meloxicam (MOBIC) 15  MG tablet    tiZANidine (ZANAFLEX) 2 MG tablet    traMADol (ULTRAM) 50 MG tablet      Diagnoses       Codes Comments    Morbid obesity due to excess calories (CMS/[facility])    -  Primary ICD-10-CM: E66.01  ICD-9-CM: 278.01     Chronic bilateral low back pain with bilateral sciatica     ICD-10-CM: M54.42, M54.41, G89.29  ICD-9-CM: 724.2, 724.3, 338.29     Lumbar stenosis with neurogenic claudication     ICD-10-CM: M48.062  ICD-9-CM: 724.03     Spondylolisthesis of lumbar region     ICD-10-CM: M43.16  ICD-9-CM: 738.4     DDD (degenerative disc disease), lumbar     ICD-10-CM: M51.36  ICD-9-CM: 722.52     Generalized osteoarthrosis, involving multiple sites     ICD-10-CM: M15.9  ICD-9-CM: 715.09                Scribed for SOURAV Melton MD by LA NENA HANKINS.  03/05/21   14:30 EST    I have personally performed the services described in this document as scribed by the above individual, and it is both accurate and complete.  SOURAV Melton MD  3/5/2021  16:18 EST

## 2021-03-08 DIAGNOSIS — M51.36 DDD (DEGENERATIVE DISC DISEASE), LUMBAR: ICD-10-CM

## 2021-03-08 DIAGNOSIS — M15.9 GENERALIZED OSTEOARTHROSIS, INVOLVING MULTIPLE SITES: ICD-10-CM

## 2021-03-08 DIAGNOSIS — M54.41 CHRONIC BILATERAL LOW BACK PAIN WITH BILATERAL SCIATICA: ICD-10-CM

## 2021-03-08 DIAGNOSIS — M54.42 CHRONIC BILATERAL LOW BACK PAIN WITH BILATERAL SCIATICA: ICD-10-CM

## 2021-03-08 DIAGNOSIS — G89.29 CHRONIC BILATERAL LOW BACK PAIN WITH BILATERAL SCIATICA: ICD-10-CM

## 2021-03-08 DIAGNOSIS — M43.16 SPONDYLOLISTHESIS OF LUMBAR REGION: ICD-10-CM

## 2021-03-08 DIAGNOSIS — M48.062 LUMBAR STENOSIS WITH NEUROGENIC CLAUDICATION: ICD-10-CM

## 2021-03-08 NOTE — TELEPHONE ENCOUNTER
Caller: [facility] - [place] [phone] [facility] [phone] FX    Relationship: Pharmacy    Best call back number: [phone] REFERENCE NUMBER [id number]    Medication needed:   Requested Prescriptions     Pending Prescriptions Disp Refills   • traMADol (ULTRAM) 50 MG tablet 360 tablet 1     Sig: Take 1 tablet by mouth Every 6 (Six) Hours As Needed for Moderate Pain .       When do you need the refill by: ASAP    What details did the patient provide when requesting the medication: PHARMACY CALLED STATES THAT THEY NEED A EXPLANATION ON WHY THE LAST FILL WAS BACK IN July. PHARMACY STATES THAT THEY HAVE A PROTOCOL THAT WHEN ITS MORE THAN 6 MONTH THEY HAVE TO DO ALTERCATIONS ON THE MEDICATION.     Does the patient have less than a 3 day supply:  [x] Yes  [] No    What is the patient's preferred pharmacy: [facility] - [place] [phone] [facility] [phone] FX

## 2021-03-11 NOTE — TELEPHONE ENCOUNTER
Spoke with [facility], they said her last fill of Gabapentin was in July for #360 and it lasted her 8 months, their protocol is to reach out to pcp and see if they can reduce quantity down to more of a 90 day supply. According to their system the recc amount it #220

## 2021-03-11 NOTE — TELEPHONE ENCOUNTER
And what is she supposed to do if she has more pain in the next 6 months than she did in the previous 8 months. Will they send her extra medication?

## 2021-03-13 RX ORDER — TRAMADOL HYDROCHLORIDE 50 MG/1
50 TABLET ORAL EVERY 6 HOURS PRN
Qty: 360 TABLET | Refills: 1 | Status: SHIPPED | OUTPATIENT
Start: 2021-03-13 | End: 2022-01-19 | Stop reason: SDUPTHER

## 2021-06-28 ENCOUNTER — HOSPITAL ENCOUNTER (OUTPATIENT)
Dept: GENERAL RADIOLOGY | Facility: HOSPITAL | Age: 71
Discharge: HOME | End: 2021-06-28
Admitting: PHYSICIAN ASSISTANT

## 2021-06-28 ENCOUNTER — TELEPHONE (OUTPATIENT)
Dept: NEUROSURGERY | Facility: CLINIC | Age: 71
End: 2021-06-28

## 2021-06-28 DIAGNOSIS — M47.817 LUMBOSACRAL SPONDYLOSIS WITHOUT MYELOPATHY: ICD-10-CM

## 2021-06-28 DIAGNOSIS — M48.062 SPINAL STENOSIS, LUMBAR REGION, WITH NEUROGENIC CLAUDICATION: ICD-10-CM

## 2021-06-28 DIAGNOSIS — M43.16 SPONDYLOLISTHESIS OF LUMBAR REGION: ICD-10-CM

## 2021-06-28 DIAGNOSIS — Z98.1 S/P LUMBAR SPINAL FUSION: ICD-10-CM

## 2021-06-28 DIAGNOSIS — M43.16 SPONDYLOLISTHESIS OF LUMBAR REGION: Primary | ICD-10-CM

## 2021-06-28 PROCEDURE — 72110 X-RAY EXAM L-2 SPINE 4/>VWS: CPT

## 2021-06-28 PROCEDURE — 73522 X-RAY EXAM HIPS BI 3-4 VIEWS: CPT

## 2021-06-28 NOTE — TELEPHONE ENCOUNTER
I have talked with the patient. With her back pain and hip pain it also radiates into the buttocks bilaterally. She states that she has started to use her walker again because of the pain. She does not describe heaviness in her legs. I am going to send her for x-rays of the lumbar spine with flexion-extension views as well as x-rays of both hips. She is going to get those done today and we can look at them and if she requires further work-up with neurosurgery we can get that scheduled, she has an upcoming appoint with orthopedic surgery at [facility] on July 20. She is going to get a copy of her scans put on a disc.    Pennie Saha PA-C

## 2021-06-28 NOTE — TELEPHONE ENCOUNTER
Provider:  Pelon  Caller: liyah  Time of call:   10:25  Phone #:  [phone]  Surgery:  Lumbar fusion  Surgery Date:  01/20/20  Last visit:  1/05/21   Next visit:     KAUSHIK:         Reason for call:     Patient called with complaints of bilateral hip and buttock pain.  She has an apt to see her Orthopedic doctor on 07/20/21, but wants to know if this pain could be stemming from her back?     Wants to know if she should have a x-ray or just see her Orthopedic doc?

## 2021-06-29 ENCOUNTER — DOCUMENTATION (OUTPATIENT)
Dept: NEUROSURGERY | Facility: CLINIC | Age: 71
End: 2021-06-29

## 2021-06-29 DIAGNOSIS — G89.29 CHRONIC BILATERAL LOW BACK PAIN WITHOUT SCIATICA: ICD-10-CM

## 2021-06-29 DIAGNOSIS — M54.50 CHRONIC BILATERAL LOW BACK PAIN WITHOUT SCIATICA: ICD-10-CM

## 2021-06-29 DIAGNOSIS — Z98.1 S/P LUMBAR SPINAL FUSION: ICD-10-CM

## 2021-06-29 DIAGNOSIS — M43.16 SPONDYLOLISTHESIS OF LUMBAR REGION: Primary | ICD-10-CM

## 2021-06-29 NOTE — PROGRESS NOTES
I have reviewed the x-rays of her hips which do not show significant arthritis.  She has some arthritis in her SI joints bilaterally.  I have reviewed the flexion-extension x-rays of the lumbar spine which shows multilevel degenerative disc disease above the level or effusion.  There is narrowing of the disc space at the level above as well as a small offset.  When I talked to the patient today she says she is having more back pain now.  I have recommended that she attend physical therapy and she is in agreement.  An order has been sent to court in [place].    Pennie Saha PA-C

## 2021-06-29 NOTE — TELEPHONE ENCOUNTER
I have reviewed the x-rays and I have talked with the patient.  I have sent her to physical therapy.

## 2021-08-10 ENCOUNTER — TELEPHONE (OUTPATIENT)
Dept: FAMILY MEDICINE CLINIC | Facility: CLINIC | Age: 71
End: 2021-08-10

## 2021-08-10 NOTE — TELEPHONE ENCOUNTER
I spoke with patient. She has had flu-like symptoms since Saturday and has been treating with ibuprofen. No SOA. She has been vaccinated but not tested. Two grandchildren in their home are positive. Advised her to continue taking ibuprofen, stay hydrated, and stay home. She is going to reach out to the health department as well.

## 2021-08-10 NOTE — TELEPHONE ENCOUNTER
Caller: Casandra Trinidad    Relationship to patient: Self    Best call back number: [phone]       Additional information or concerns: PATIENT CALLED TO SEE IF THEY NEED TO BE DOING SOMETHING MORE.  PATIENT IS TAKING IBUPROFEN FOR THE FEVER. PATIENT STATED THAT HER GRANDDAUGHTER TESTED POSITIVE FOR COVID AND SHE LIVED IN THE HOME WITH HER.

## 2021-11-27 DIAGNOSIS — M54.42 CHRONIC BILATERAL LOW BACK PAIN WITH BILATERAL SCIATICA: ICD-10-CM

## 2021-11-27 DIAGNOSIS — G89.29 CHRONIC BILATERAL LOW BACK PAIN WITH BILATERAL SCIATICA: ICD-10-CM

## 2021-11-27 DIAGNOSIS — M54.41 CHRONIC BILATERAL LOW BACK PAIN WITH BILATERAL SCIATICA: ICD-10-CM

## 2021-11-27 DIAGNOSIS — M48.062 LUMBAR STENOSIS WITH NEUROGENIC CLAUDICATION: ICD-10-CM

## 2021-11-27 DIAGNOSIS — M15.9 GENERALIZED OSTEOARTHROSIS, INVOLVING MULTIPLE SITES: ICD-10-CM

## 2021-11-27 DIAGNOSIS — M51.36 DDD (DEGENERATIVE DISC DISEASE), LUMBAR: ICD-10-CM

## 2021-11-27 DIAGNOSIS — M43.16 SPONDYLOLISTHESIS OF LUMBAR REGION: ICD-10-CM

## 2021-11-29 RX ORDER — GABAPENTIN 300 MG/1
CAPSULE ORAL
Qty: 270 CAPSULE | Refills: 1 | OUTPATIENT
Start: 2021-11-29

## 2022-01-19 ENCOUNTER — OFFICE VISIT (OUTPATIENT)
Dept: FAMILY MEDICINE CLINIC | Facility: CLINIC | Age: 72
End: 2022-01-19

## 2022-01-19 VITALS
OXYGEN SATURATION: 99 % | SYSTOLIC BLOOD PRESSURE: 140 MMHG | BODY MASS INDEX: 44.8 KG/M2 | HEART RATE: 83 BPM | DIASTOLIC BLOOD PRESSURE: 86 MMHG | TEMPERATURE: 97.2 F | RESPIRATION RATE: 14 BRPM | HEIGHT: 60 IN | WEIGHT: 228.2 LBS

## 2022-01-19 DIAGNOSIS — M54.42 CHRONIC BILATERAL LOW BACK PAIN WITH BILATERAL SCIATICA: ICD-10-CM

## 2022-01-19 DIAGNOSIS — M48.062 LUMBAR STENOSIS WITH NEUROGENIC CLAUDICATION: ICD-10-CM

## 2022-01-19 DIAGNOSIS — K21.9 GASTROESOPHAGEAL REFLUX DISEASE, UNSPECIFIED WHETHER ESOPHAGITIS PRESENT: Primary | ICD-10-CM

## 2022-01-19 DIAGNOSIS — M43.16 SPONDYLOLISTHESIS OF LUMBAR REGION: ICD-10-CM

## 2022-01-19 DIAGNOSIS — G89.29 CHRONIC BILATERAL LOW BACK PAIN WITH BILATERAL SCIATICA: ICD-10-CM

## 2022-01-19 DIAGNOSIS — M54.41 CHRONIC BILATERAL LOW BACK PAIN WITH BILATERAL SCIATICA: ICD-10-CM

## 2022-01-19 DIAGNOSIS — M51.36 DDD (DEGENERATIVE DISC DISEASE), LUMBAR: ICD-10-CM

## 2022-01-19 DIAGNOSIS — F41.1 GAD (GENERALIZED ANXIETY DISORDER): ICD-10-CM

## 2022-01-19 DIAGNOSIS — M15.9 GENERALIZED OSTEOARTHROSIS, INVOLVING MULTIPLE SITES: ICD-10-CM

## 2022-01-19 PROCEDURE — 99214 OFFICE O/P EST MOD 30 MIN: CPT | Performed by: FAMILY MEDICINE

## 2022-01-19 RX ORDER — GABAPENTIN 300 MG/1
300 CAPSULE ORAL 3 TIMES DAILY
Qty: 270 CAPSULE | Refills: 1 | Status: SHIPPED | OUTPATIENT
Start: 2022-01-19 | End: 2023-01-20 | Stop reason: SDUPTHER

## 2022-01-19 RX ORDER — TRAMADOL HYDROCHLORIDE 50 MG/1
50 TABLET ORAL EVERY 6 HOURS PRN
Qty: 360 TABLET | Refills: 1 | Status: SHIPPED | OUTPATIENT
Start: 2022-01-19 | End: 2023-01-20 | Stop reason: SDUPTHER

## 2022-01-19 RX ORDER — MELOXICAM 15 MG/1
15 TABLET ORAL DAILY
Qty: 90 TABLET | Refills: 3 | Status: SHIPPED | OUTPATIENT
Start: 2022-01-19 | End: 2023-01-20 | Stop reason: SDUPTHER

## 2022-01-19 RX ORDER — SERTRALINE HYDROCHLORIDE 25 MG/1
25 TABLET, FILM COATED ORAL DAILY
Qty: 90 TABLET | Refills: 3 | Status: SHIPPED | OUTPATIENT
Start: 2022-01-19 | End: 2023-01-20 | Stop reason: SDUPTHER

## 2022-01-19 RX ORDER — OMEPRAZOLE 20 MG/1
20 CAPSULE, DELAYED RELEASE ORAL DAILY
Qty: 90 CAPSULE | Refills: 3 | Status: SHIPPED | OUTPATIENT
Start: 2022-01-19 | End: 2023-01-20 | Stop reason: SDUPTHER

## 2022-01-19 NOTE — PROGRESS NOTES
"Subjective   Casandra Trinidad is a 71 y.o. female    Chief Complaint    Chronic back pain  Lumbar stenosis with claudication  Spondylolisthesis  Osteoarthritis    History of Present Illness  The patient presents today for medication refills and follow-up regarding her back issues. She reports that she has been to the dermatologist and had a precancerous lesion removed from her knee. She states the one on her knees is worse than the other.    She had the Moderna vaccine in 03/2021 and then had COVID-19 in 08/2021. The patient complains of fatigue. She said she had terrible headaches when she had COVID-19, but does not have them now. The patient reports  her balance is \"horrible\" and she feels like she is \"walking around drunk.\" She states that her leg is weak after her back surgery by Dr. Bird in 2021, but her back does not hurt.     She has had her mammogram. The patient is due for a colonoscopy, but she wants to wait on that. She reports that she is having a lot of loose stools or shahzad. The patient states she does not need a refill of the tizanidine. She reports that she uses the Lidoderm patch only occasionally and does not need 60. The patient said [facility] sent a request for gabapentin, but she did not get it yet. She reports that she is still taking her tramadol as needed. The patient also needs tramadol, meloxicam, Prilosec, and Zoloft.    The following portions of the patient's history were reviewed and updated as appropriate: allergies, current medications, past social history and problem list    Review of Systems   Constitutional: Negative.  Negative for appetite change, fatigue and unexpected weight change.   Respiratory: Negative.  Negative for chest tightness and shortness of breath.    Cardiovascular: Negative for chest pain.   Gastrointestinal: Negative.  Negative for abdominal distention, abdominal pain, diarrhea and nausea.        Patient experiencing heartburn/acid reflux   "   Musculoskeletal: Positive for back pain. Negative for arthralgias, gait problem and myalgias.   Neurological: Negative for dizziness, tremors, weakness, light-headedness, numbness and headaches.   Psychiatric/Behavioral: Positive for sleep disturbance. Negative for agitation, behavioral problems, confusion, decreased concentration, dysphoric mood and suicidal ideas. The patient is not nervous/anxious.        Objective     Vitals:    01/19/22 1034   BP: 140/86   Pulse: 83   Resp: 14   Temp: 97.2 °F (36.2 °C)   SpO2: 99%       Physical Exam  Vitals and nursing note reviewed.   Constitutional:       General: She is not in acute distress.     Appearance: Normal appearance. She is well-developed. She is not ill-appearing, toxic-appearing or diaphoretic.   Eyes:      General: No scleral icterus.     Conjunctiva/sclera: Conjunctivae normal.   Cardiovascular:      Rate and Rhythm: Normal rate and regular rhythm.      Heart sounds: Normal heart sounds.   Pulmonary:      Effort: Pulmonary effort is normal. No respiratory distress.      Breath sounds: Normal breath sounds. No wheezing or rales.   Abdominal:      General: Bowel sounds are normal. There is no distension.      Palpations: Abdomen is soft. There is no mass.      Tenderness: There is no abdominal tenderness. There is no guarding or rebound.      Hernia: No hernia is present.   Musculoskeletal:      Lumbar back: Tenderness and bony tenderness present. No swelling, deformity or spasms. Decreased range of motion.   Skin:     General: Skin is warm and dry.      Coloration: Skin is not jaundiced or pale.   Neurological:      Mental Status: She is alert and oriented to person, place, and time.      Deep Tendon Reflexes: Reflexes are normal and symmetric.         Assessment/Plan   Problems Addressed this Visit        Gastrointestinal Abdominal     Esophageal reflux - Primary    Relevant Medications    omeprazole (priLOSEC) 20 MG capsule       Mental Health    CLAUDIA  (generalized anxiety disorder)    Relevant Medications    sertraline (ZOLOFT) 25 MG tablet       Musculoskeletal and Injuries    Generalized osteoarthrosis, involving multiple sites    Relevant Medications    gabapentin (NEURONTIN) 300 MG capsule    traMADol (ULTRAM) 50 MG tablet    meloxicam (MOBIC) 15 MG tablet    Spondylolisthesis of lumbar region    Relevant Medications    gabapentin (NEURONTIN) 300 MG capsule    traMADol (ULTRAM) 50 MG tablet    meloxicam (MOBIC) 15 MG tablet       Neuro    Lumbar stenosis with neurogenic claudication    Relevant Medications    gabapentin (NEURONTIN) 300 MG capsule    traMADol (ULTRAM) 50 MG tablet    meloxicam (MOBIC) 15 MG tablet    DDD (degenerative disc disease), lumbar    Relevant Medications    gabapentin (NEURONTIN) 300 MG capsule    Lidocaine 1.8 % patch    traMADol (ULTRAM) 50 MG tablet    meloxicam (MOBIC) 15 MG tablet      Other Visit Diagnoses     Chronic bilateral low back pain with bilateral sciatica        Relevant Medications    gabapentin (NEURONTIN) 300 MG capsule    Lidocaine 1.8 % patch    traMADol (ULTRAM) 50 MG tablet    meloxicam (MOBIC) 15 MG tablet      Diagnoses       Codes Comments    Gastroesophageal reflux disease, unspecified whether esophagitis present    -  Primary ICD-10-CM: K21.9  ICD-9-CM: 530.81     Chronic bilateral low back pain with bilateral sciatica     ICD-10-CM: M54.42, M54.41, G89.29  ICD-9-CM: 724.2, 724.3, 338.29     Lumbar stenosis with neurogenic claudication     ICD-10-CM: M48.062  ICD-9-CM: 724.03     Spondylolisthesis of lumbar region     ICD-10-CM: M43.16  ICD-9-CM: 738.4     DDD (degenerative disc disease), lumbar     ICD-10-CM: M51.36  ICD-9-CM: 722.52     Generalized osteoarthrosis, involving multiple sites     ICD-10-CM: M15.9  ICD-9-CM: 715.09     CLAUDIA (generalized anxiety disorder)     ICD-10-CM: F41.1  ICD-9-CM: 300.02         I spent 30 minutes in patient care: Reviewing records prior to the visit, examining the  patient, entering orders and documentation    Part of this note may be an electronic transcription/translation of spoken language to printed text using the Dragon Dictation System.         Transcribed from ambient dictation for SOURAV Melton MD by Maddi Billings.  01/19/22   11:55 EST    Patient verbalized consent to the visit recording.  I have personally performed the services described in this document as transcribed by the above individual, and it is both accurate and complete.  SOURAV Melton MD  1/19/2022  12:51 EST

## 2022-04-11 ENCOUNTER — TRANSCRIBE ORDERS (OUTPATIENT)
Dept: FAMILY MEDICINE CLINIC | Facility: CLINIC | Age: 72
End: 2022-04-11

## 2022-04-11 ENCOUNTER — TELEPHONE (OUTPATIENT)
Dept: FAMILY MEDICINE CLINIC | Facility: CLINIC | Age: 72
End: 2022-04-11

## 2022-04-11 DIAGNOSIS — K62.5 RECTAL BLEEDING: ICD-10-CM

## 2022-04-11 DIAGNOSIS — R19.8 EPIGASTRIC HEAVINESS: Primary | ICD-10-CM

## 2022-04-25 RX ORDER — SOD SULF/POT CHLORIDE/MAG SULF 1.479 G
1 TABLET ORAL ONCE
Qty: 24 TABLET | Refills: 0 | Status: SHIPPED | OUTPATIENT
Start: 2022-04-25 | End: 2022-04-27

## 2022-05-04 ENCOUNTER — OUTSIDE FACILITY SERVICE (OUTPATIENT)
Dept: GASTROENTEROLOGY | Facility: CLINIC | Age: 72
End: 2022-05-04

## 2022-05-04 PROCEDURE — G0105 COLORECTAL SCRN; HI RISK IND: HCPCS | Performed by: INTERNAL MEDICINE

## 2022-05-07 DIAGNOSIS — M15.9 GENERALIZED OSTEOARTHROSIS, INVOLVING MULTIPLE SITES: ICD-10-CM

## 2022-05-07 DIAGNOSIS — M51.36 DDD (DEGENERATIVE DISC DISEASE), LUMBAR: ICD-10-CM

## 2022-05-07 DIAGNOSIS — G89.29 CHRONIC BILATERAL LOW BACK PAIN WITH BILATERAL SCIATICA: ICD-10-CM

## 2022-05-07 DIAGNOSIS — M54.41 CHRONIC BILATERAL LOW BACK PAIN WITH BILATERAL SCIATICA: ICD-10-CM

## 2022-05-07 DIAGNOSIS — M54.42 CHRONIC BILATERAL LOW BACK PAIN WITH BILATERAL SCIATICA: ICD-10-CM

## 2022-05-07 DIAGNOSIS — M43.16 SPONDYLOLISTHESIS OF LUMBAR REGION: ICD-10-CM

## 2022-05-07 DIAGNOSIS — M48.062 LUMBAR STENOSIS WITH NEUROGENIC CLAUDICATION: ICD-10-CM

## 2022-05-09 RX ORDER — TIZANIDINE 2 MG/1
TABLET ORAL
Qty: 270 TABLET | Refills: 0 | Status: SHIPPED | OUTPATIENT
Start: 2022-05-09 | End: 2022-10-14

## 2022-06-06 ENCOUNTER — TELEPHONE (OUTPATIENT)
Dept: FAMILY MEDICINE CLINIC | Facility: CLINIC | Age: 72
End: 2022-06-06

## 2022-06-06 NOTE — TELEPHONE ENCOUNTER
Pt needing orders for additional mammogram views and ultrasound sent to [facility] at .  Fax #: [phone]

## 2022-06-07 ENCOUNTER — TELEPHONE (OUTPATIENT)
Dept: FAMILY MEDICINE CLINIC | Facility: CLINIC | Age: 72
End: 2022-06-07

## 2022-06-07 ENCOUNTER — TRANSCRIBE ORDERS (OUTPATIENT)
Dept: FAMILY MEDICINE CLINIC | Facility: CLINIC | Age: 72
End: 2022-06-07

## 2022-06-07 DIAGNOSIS — R92.8 ABNORMAL MAMMOGRAM OF BOTH BREASTS: Primary | ICD-10-CM

## 2022-06-29 DIAGNOSIS — R42 DIZZINESS: ICD-10-CM

## 2022-06-29 RX ORDER — MECLIZINE HYDROCHLORIDE 25 MG/1
25 TABLET ORAL 3 TIMES DAILY PRN
Qty: 90 TABLET | Refills: 3 | Status: SHIPPED | OUTPATIENT
Start: 2022-06-29

## 2022-06-29 NOTE — TELEPHONE ENCOUNTER
Caller: Casandra Trinidad    Relationship: Self    Best call back number: [phone]    Requested Prescriptions:   Requested Prescriptions     Pending Prescriptions Disp Refills   • meclizine (ANTIVERT) 25 MG tablet 90 tablet 3     Sig: Take 1 tablet by mouth 3 (Three) Times a Day As Needed for Dizziness.        Pharmacy where request should be sent: [facility] - [address] [phone] [facility] [phone] FX     Additional details provided by patient: PATIENT STATED THAT SHE HAS HAD SYMPTOMS FPR 2 DYS.  PATIENT HAS MEDICATION FROM 2020.    Does the patient have less than a 3 day supply:  [x] Yes  [] No    SiAndreina Lanza Rep   06/29/22 13:19 EDT

## 2022-10-09 DIAGNOSIS — M43.16 SPONDYLOLISTHESIS OF LUMBAR REGION: ICD-10-CM

## 2022-10-09 DIAGNOSIS — M15.9 GENERALIZED OSTEOARTHROSIS, INVOLVING MULTIPLE SITES: ICD-10-CM

## 2022-10-09 DIAGNOSIS — M54.41 CHRONIC BILATERAL LOW BACK PAIN WITH BILATERAL SCIATICA: ICD-10-CM

## 2022-10-09 DIAGNOSIS — M54.42 CHRONIC BILATERAL LOW BACK PAIN WITH BILATERAL SCIATICA: ICD-10-CM

## 2022-10-09 DIAGNOSIS — M51.36 DDD (DEGENERATIVE DISC DISEASE), LUMBAR: ICD-10-CM

## 2022-10-09 DIAGNOSIS — M48.062 LUMBAR STENOSIS WITH NEUROGENIC CLAUDICATION: ICD-10-CM

## 2022-10-09 DIAGNOSIS — G89.29 CHRONIC BILATERAL LOW BACK PAIN WITH BILATERAL SCIATICA: ICD-10-CM

## 2022-10-14 RX ORDER — TIZANIDINE 2 MG/1
TABLET ORAL
Qty: 270 TABLET | Refills: 3 | Status: SHIPPED | OUTPATIENT
Start: 2022-10-14

## 2022-10-14 NOTE — TELEPHONE ENCOUNTER
Rx Refill Note  Requested Prescriptions     Pending Prescriptions Disp Refills   • tiZANidine (ZANAFLEX) 2 MG tablet [Pharmacy Med Name: TIZANIDINE HCL TABS 2MG] 270 tablet 3     Sig: TAKE 1 TABLET THREE TIMES A DAY      Last office visit with prescribing clinician: 1/19/2022      Next office visit with prescribing clinician: 1/20/2023            Sia Reveles MA  10/14/22, 08:27 EDT

## 2022-11-28 ENCOUNTER — TELEPHONE (OUTPATIENT)
Dept: FAMILY MEDICINE CLINIC | Facility: CLINIC | Age: 72
End: 2022-11-28

## 2022-11-28 NOTE — TELEPHONE ENCOUNTER
Pt calling reporting that  is requesting her to have a Breast US and Diagnostic Mammogram. Please enter order and send to:    F: [phone]

## 2022-12-02 ENCOUNTER — TRANSCRIBE ORDERS (OUTPATIENT)
Dept: FAMILY MEDICINE CLINIC | Facility: CLINIC | Age: 72
End: 2022-12-02

## 2022-12-02 ENCOUNTER — TELEPHONE (OUTPATIENT)
Dept: FAMILY MEDICINE CLINIC | Facility: CLINIC | Age: 72
End: 2022-12-02

## 2022-12-02 DIAGNOSIS — R92.8 ABNORMAL MAMMOGRAM OF LEFT BREAST: Primary | ICD-10-CM

## 2022-12-03 DIAGNOSIS — M54.41 CHRONIC BILATERAL LOW BACK PAIN WITH BILATERAL SCIATICA: ICD-10-CM

## 2022-12-03 DIAGNOSIS — G89.29 CHRONIC BILATERAL LOW BACK PAIN WITH BILATERAL SCIATICA: ICD-10-CM

## 2022-12-03 DIAGNOSIS — M51.36 DDD (DEGENERATIVE DISC DISEASE), LUMBAR: ICD-10-CM

## 2022-12-03 DIAGNOSIS — M48.062 LUMBAR STENOSIS WITH NEUROGENIC CLAUDICATION: ICD-10-CM

## 2022-12-03 DIAGNOSIS — M15.9 GENERALIZED OSTEOARTHROSIS, INVOLVING MULTIPLE SITES: ICD-10-CM

## 2022-12-03 DIAGNOSIS — M54.42 CHRONIC BILATERAL LOW BACK PAIN WITH BILATERAL SCIATICA: ICD-10-CM

## 2022-12-03 DIAGNOSIS — M43.16 SPONDYLOLISTHESIS OF LUMBAR REGION: ICD-10-CM

## 2022-12-05 RX ORDER — GABAPENTIN 300 MG/1
CAPSULE ORAL
Qty: 270 CAPSULE | Refills: 1 | OUTPATIENT
Start: 2022-12-05

## 2022-12-08 ENCOUNTER — TRANSCRIBE ORDERS (OUTPATIENT)
Dept: FAMILY MEDICINE CLINIC | Facility: CLINIC | Age: 72
End: 2022-12-08

## 2022-12-08 DIAGNOSIS — R92.8 ABNORMAL MAMMOGRAM OF RIGHT BREAST: Primary | ICD-10-CM

## 2022-12-14 DIAGNOSIS — M48.062 LUMBAR STENOSIS WITH NEUROGENIC CLAUDICATION: ICD-10-CM

## 2022-12-14 DIAGNOSIS — G89.29 CHRONIC BILATERAL LOW BACK PAIN WITH BILATERAL SCIATICA: ICD-10-CM

## 2022-12-14 DIAGNOSIS — M15.9 GENERALIZED OSTEOARTHROSIS, INVOLVING MULTIPLE SITES: ICD-10-CM

## 2022-12-14 DIAGNOSIS — M51.36 DDD (DEGENERATIVE DISC DISEASE), LUMBAR: ICD-10-CM

## 2022-12-14 DIAGNOSIS — M43.16 SPONDYLOLISTHESIS OF LUMBAR REGION: ICD-10-CM

## 2022-12-14 DIAGNOSIS — M54.41 CHRONIC BILATERAL LOW BACK PAIN WITH BILATERAL SCIATICA: ICD-10-CM

## 2022-12-14 DIAGNOSIS — M54.42 CHRONIC BILATERAL LOW BACK PAIN WITH BILATERAL SCIATICA: ICD-10-CM

## 2022-12-14 RX ORDER — GABAPENTIN 300 MG/1
300 CAPSULE ORAL 3 TIMES DAILY
Qty: 270 CAPSULE | Refills: 1 | OUTPATIENT
Start: 2022-12-14

## 2022-12-14 NOTE — TELEPHONE ENCOUNTER
Caller: Casandra Trinidad    Relationship: Self    Best call back number: [phone]    Requested Prescriptions:   Requested Prescriptions     Pending Prescriptions Disp Refills   • gabapentin (NEURONTIN) 300 MG capsule 270 capsule 1     Sig: Take 1 capsule by mouth 3 (Three) Times a Day.        Pharmacy where request should be sent: [facility] - [address] [phone] [facility] [phone]      Additional details provided by patient: PATIENT HAS THREE DAYS LEFT.    Does the patient have less than a 3 day supply:  [x] Yes  [] No    Would you like a call back once the refill request has been completed: [x] Yes [] No    If the office needs to give you a call back, can they leave a voicemail: [x] Yes [] No    Andreina Perez Rep   12/14/22 09:59 EST

## 2023-01-11 RX ORDER — ERYTHROMYCIN 5 MG/G
OINTMENT OPHTHALMIC
Qty: 3.5 G | Refills: 0 | Status: SHIPPED | OUTPATIENT
Start: 2023-01-11

## 2023-01-20 ENCOUNTER — OFFICE VISIT (OUTPATIENT)
Dept: FAMILY MEDICINE CLINIC | Facility: CLINIC | Age: 73
End: 2023-01-20
Payer: MEDICARE

## 2023-01-20 VITALS
SYSTOLIC BLOOD PRESSURE: 134 MMHG | HEART RATE: 69 BPM | WEIGHT: 223.8 LBS | OXYGEN SATURATION: 96 % | BODY MASS INDEX: 43.94 KG/M2 | TEMPERATURE: 98 F | DIASTOLIC BLOOD PRESSURE: 66 MMHG | HEIGHT: 60 IN | RESPIRATION RATE: 18 BRPM

## 2023-01-20 DIAGNOSIS — Z98.84 HISTORY OF BARIATRIC SURGERY: ICD-10-CM

## 2023-01-20 DIAGNOSIS — K21.9 GASTROESOPHAGEAL REFLUX DISEASE, UNSPECIFIED WHETHER ESOPHAGITIS PRESENT: ICD-10-CM

## 2023-01-20 DIAGNOSIS — Z00.00 MEDICARE ANNUAL WELLNESS VISIT, SUBSEQUENT: Primary | ICD-10-CM

## 2023-01-20 DIAGNOSIS — G89.29 CHRONIC BILATERAL LOW BACK PAIN WITH BILATERAL SCIATICA: ICD-10-CM

## 2023-01-20 DIAGNOSIS — M43.16 SPONDYLOLISTHESIS OF LUMBAR REGION: ICD-10-CM

## 2023-01-20 DIAGNOSIS — M48.062 LUMBAR STENOSIS WITH NEUROGENIC CLAUDICATION: ICD-10-CM

## 2023-01-20 DIAGNOSIS — M54.42 CHRONIC BILATERAL LOW BACK PAIN WITH BILATERAL SCIATICA: ICD-10-CM

## 2023-01-20 DIAGNOSIS — M54.41 CHRONIC BILATERAL LOW BACK PAIN WITH BILATERAL SCIATICA: ICD-10-CM

## 2023-01-20 DIAGNOSIS — E66.01 CLASS 3 SEVERE OBESITY DUE TO EXCESS CALORIES WITHOUT SERIOUS COMORBIDITY WITH BODY MASS INDEX (BMI) OF 40.0 TO 44.9 IN ADULT: ICD-10-CM

## 2023-01-20 DIAGNOSIS — M15.9 GENERALIZED OSTEOARTHROSIS, INVOLVING MULTIPLE SITES: ICD-10-CM

## 2023-01-20 DIAGNOSIS — F41.1 GAD (GENERALIZED ANXIETY DISORDER): ICD-10-CM

## 2023-01-20 DIAGNOSIS — M51.36 DDD (DEGENERATIVE DISC DISEASE), LUMBAR: ICD-10-CM

## 2023-01-20 DIAGNOSIS — Z51.81 MEDICATION MONITORING ENCOUNTER: ICD-10-CM

## 2023-01-20 PROCEDURE — G0439 PPPS, SUBSEQ VISIT: HCPCS | Performed by: FAMILY MEDICINE

## 2023-01-20 PROCEDURE — 1170F FXNL STATUS ASSESSED: CPT | Performed by: FAMILY MEDICINE

## 2023-01-20 PROCEDURE — 1159F MED LIST DOCD IN RCRD: CPT | Performed by: FAMILY MEDICINE

## 2023-01-20 RX ORDER — SERTRALINE HYDROCHLORIDE 25 MG/1
25 TABLET, FILM COATED ORAL DAILY
Qty: 90 TABLET | Refills: 3 | Status: SHIPPED | OUTPATIENT
Start: 2023-01-20

## 2023-01-20 RX ORDER — OMEPRAZOLE 20 MG/1
20 CAPSULE, DELAYED RELEASE ORAL DAILY
Qty: 90 CAPSULE | Refills: 3 | Status: SHIPPED | OUTPATIENT
Start: 2023-01-20

## 2023-01-20 RX ORDER — TRAMADOL HYDROCHLORIDE 50 MG/1
50 TABLET ORAL EVERY 6 HOURS PRN
Qty: 360 TABLET | Refills: 1 | Status: SHIPPED | OUTPATIENT
Start: 2023-01-20 | End: 2023-02-02 | Stop reason: SDUPTHER

## 2023-01-20 RX ORDER — MELOXICAM 15 MG/1
15 TABLET ORAL DAILY
Qty: 90 TABLET | Refills: 3 | Status: SHIPPED | OUTPATIENT
Start: 2023-01-20

## 2023-01-20 RX ORDER — GABAPENTIN 300 MG/1
300 CAPSULE ORAL 3 TIMES DAILY
Qty: 270 CAPSULE | Refills: 1 | Status: SHIPPED | OUTPATIENT
Start: 2023-01-20

## 2023-01-21 NOTE — PROGRESS NOTES
The ABCs of the Annual Wellness Visit  Subsequent Medicare Wellness Visit    Chief Complaint   Patient presents with   • Medicare Wellness-subsequent      Subjective   History of Present Illness:  Casandra Trinidad is a 72 y.o. female who presents for a Subsequent Medicare Wellness Visit.    The following portions of the patient's history were reviewed and   updated as appropriate: allergies, current medications, past family history, past medical history, past social history, past surgical history and problem list.    Compared to one year ago, the patient feels her physical   health is the same.    Compared to one year ago, the patient feels her mental   health is the same.    Recent Hospitalizations:  She was not admitted to the hospital during the last year.       Current Medical Providers:  Patient Care Team:  Tony Melton MD as PCP - General (Family Medicine)  Sachin Schumacher MD PhD as Consulting Physician (Orthopedic Surgery)  Gagandeep Dc MD as Consulting Physician (Pain Medicine)  Olive Zhou APRN as Nurse Practitioner (Nurse Practitioner)  Cira Ortega MD as Consulting Physician (Obstetrics and Gynecology)  Tony Melton MD as Referring Physician (Family Medicine)    Outpatient Medications Prior to Visit   Medication Sig Dispense Refill   • aspirin 81 MG EC tablet Take 81 mg by mouth daily.     • Calcium Carbonate-Vitamin D 600-200 MG-UNIT tablet Take 1 tablet by mouth 2 (two) times a day.     • erythromycin (ROMYCIN) 5 MG/GM ophthalmic ointment Apply to infected eyelid twice daily 3.5 g 0   • Lidocaine 1.8 % patch Apply 1 patch topically Daily. 60 patch 3   • meclizine (ANTIVERT) 25 MG tablet Take 1 tablet by mouth 3 (Three) Times a Day As Needed for Dizziness. 90 tablet 3   • Multiple Vitamins-Minerals (ICAPS AREDS 2 PO) Take 1 capsule by mouth every night.     • tiZANidine (ZANAFLEX) 2 MG tablet TAKE 1 TABLET THREE TIMES A  tablet 3   •  gabapentin (NEURONTIN) 300 MG capsule Take 1 capsule by mouth 3 (Three) Times a Day. 270 capsule 1   • meloxicam (MOBIC) 15 MG tablet Take 1 tablet by mouth Daily. 90 tablet 3   • omeprazole (priLOSEC) 20 MG capsule Take 1 capsule by mouth Daily. 90 capsule 3   • sertraline (ZOLOFT) 25 MG tablet Take 1 tablet by mouth Daily. 90 tablet 3   • traMADol (ULTRAM) 50 MG tablet Take 1 tablet by mouth Every 6 (Six) Hours As Needed for Moderate Pain . 360 tablet 1     No facility-administered medications prior to visit.       Opioid medication/s are on active medication list.  and I have evaluated her active treatment plan and pain score trends (see table).  Vitals:    01/20/23 0851   PainSc: 0-No pain     I have reviewed the chart for potential of high risk medication and harmful drug interactions in the elderly.            Aspirin is on active medication list. Aspirin use is indicated based on review of current medical condition/s. Pros and cons of this therapy have been discussed today. Benefits of this medication outweigh potential harm.  Patient has been encouraged to continue taking this medication.  .      Patient Active Problem List   Diagnosis   • Menopause   • Generalized osteoarthrosis, involving multiple sites   • Esophageal reflux   • CLAUDIA (generalized anxiety disorder)   • Spondylosis without myelopathy or radiculopathy, lumbar region   • Lumbar stenosis with neurogenic claudication   • Spondylolisthesis of lumbar region   • DDD (degenerative disc disease), lumbar   • Morbid obesity due to excess calories (HCC)   • History of bilateral knee replacement   • History of bariatric surgery   • Physical deconditioning   • PMB (postmenopausal bleeding)     Advance Care Planning  has NO advanced directive - not interested in additional information    Review of Systems      Objective      Vitals:    01/20/23 0851   BP: 134/66   Pulse: 69   Resp: 18   Temp: 98 °F (36.7 °C)   SpO2: 96%   Weight: 102 kg (223 lb 12.8 oz)  "  Height: 152.4 cm (60\")   PainSc: 0-No pain     BMI Readings from Last 1 Encounters:   01/20/23 43.71 kg/m²   BMI is above normal parameters. Recommendations include: nutrition counseling    Does the patient have evidence of cognitive impairment? No    Physical Exam            HEALTH RISK ASSESSMENT    Smoking Status:  Social History     Tobacco Use   Smoking Status Never   Smokeless Tobacco Never     Alcohol Consumption:  Social History     Substance and Sexual Activity   Alcohol Use No     Fall Risk Screen:    KEVINADI Fall Risk Assessment was completed, and patient is at LOW risk for falls.Assessment completed on:1/20/2023    Depression Screening:  PHQ-2/PHQ-9 Depression Screening 1/20/2023   Retired Total Score -   Little Interest or Pleasure in Doing Things 0-->not at all   Feeling Down, Depressed or Hopeless 0-->not at all   PHQ-9: Brief Depression Severity Measure Score 0       Health Habits and Functional and Cognitive Screening:  Functional & Cognitive Status 11/25/2019   Do you have difficulty preparing food and eating? No   Do you have difficulty bathing yourself, getting dressed or grooming yourself? No   Do you have difficulty using the toilet? No   Do you have difficulty moving around from place to place? Yes   Do you have trouble with steps or getting out of a bed or a chair? Yes   Current Diet Well Balanced Diet   Dental Exam Up to date   Eye Exam Up to date   Exercise (times per week) 3 times per week   Current Exercise Activities Include Swimming   Do you need help using the phone?  No   Are you deaf or do you have serious difficulty hearing?  No   Do you need help with transportation? Yes   Do you need help shopping? Yes   Do you need help preparing meals?  No   Do you need help with housework?  No   Do you need help with laundry? No   Do you need help taking your medications? No   Do you need help managing money? No   Do you ever drive or ride in a car without wearing a seat belt? Yes   Have you " felt unusual stress, anger or loneliness in the last month? -   Who do you live with? -   If you need help, do you have trouble finding someone available to you? -   Have you been bothered in the last four weeks by sexual problems? -       Age-appropriate Screening Schedule:  Refer to the list below for future screening recommendations based on patient's age, sex and/or medical conditions. Orders for these recommended tests are listed in the plan section. The patient has been provided with a written plan.    Health Maintenance   Topic Date Due   • DXA SCAN  Never done   • LIPID PANEL  11/25/2020   • INFLUENZA VACCINE  08/01/2022   • MAMMOGRAM  01/11/2025   • TDAP/TD VACCINES (2 - Td or Tdap) 02/03/2027   • ZOSTER VACCINE  Completed              Assessment & Plan     CMS Preventative Services Quick Reference  Risk Factors Identified During Encounter  Immunizations Discussed/Encouraged: Shingrix  The above risks/problems have been discussed with the patient.  Follow up actions/plans if indicated are seen below in the Assessment/Plan Section.  Pertinent information has been shared with the patient in the After Visit Summary.    Diagnoses and all orders for this visit:    1. Medicare annual wellness visit, subsequent (Primary)    2. Chronic bilateral low back pain with bilateral sciatica  -     gabapentin (NEURONTIN) 300 MG capsule; Take 1 capsule by mouth 3 (Three) Times a Day.  Dispense: 270 capsule; Refill: 1  -     meloxicam (MOBIC) 15 MG tablet; Take 1 tablet by mouth Daily.  Dispense: 90 tablet; Refill: 3  -     traMADol (ULTRAM) 50 MG tablet; Take 1 tablet by mouth Every 6 (Six) Hours As Needed for Moderate Pain.  Dispense: 360 tablet; Refill: 1    3. Lumbar stenosis with neurogenic claudication  -     gabapentin (NEURONTIN) 300 MG capsule; Take 1 capsule by mouth 3 (Three) Times a Day.  Dispense: 270 capsule; Refill: 1  -     meloxicam (MOBIC) 15 MG tablet; Take 1 tablet by mouth Daily.  Dispense: 90 tablet;  Refill: 3  -     traMADol (ULTRAM) 50 MG tablet; Take 1 tablet by mouth Every 6 (Six) Hours As Needed for Moderate Pain.  Dispense: 360 tablet; Refill: 1    4. Spondylolisthesis of lumbar region  -     gabapentin (NEURONTIN) 300 MG capsule; Take 1 capsule by mouth 3 (Three) Times a Day.  Dispense: 270 capsule; Refill: 1  -     meloxicam (MOBIC) 15 MG tablet; Take 1 tablet by mouth Daily.  Dispense: 90 tablet; Refill: 3  -     traMADol (ULTRAM) 50 MG tablet; Take 1 tablet by mouth Every 6 (Six) Hours As Needed for Moderate Pain.  Dispense: 360 tablet; Refill: 1    5. DDD (degenerative disc disease), lumbar  -     gabapentin (NEURONTIN) 300 MG capsule; Take 1 capsule by mouth 3 (Three) Times a Day.  Dispense: 270 capsule; Refill: 1  -     meloxicam (MOBIC) 15 MG tablet; Take 1 tablet by mouth Daily.  Dispense: 90 tablet; Refill: 3  -     traMADol (ULTRAM) 50 MG tablet; Take 1 tablet by mouth Every 6 (Six) Hours As Needed for Moderate Pain.  Dispense: 360 tablet; Refill: 1    6. Generalized osteoarthrosis, involving multiple sites  -     gabapentin (NEURONTIN) 300 MG capsule; Take 1 capsule by mouth 3 (Three) Times a Day.  Dispense: 270 capsule; Refill: 1  -     meloxicam (MOBIC) 15 MG tablet; Take 1 tablet by mouth Daily.  Dispense: 90 tablet; Refill: 3  -     traMADol (ULTRAM) 50 MG tablet; Take 1 tablet by mouth Every 6 (Six) Hours As Needed for Moderate Pain.  Dispense: 360 tablet; Refill: 1    7. CLAUDIA (generalized anxiety disorder)  -     sertraline (ZOLOFT) 25 MG tablet; Take 1 tablet by mouth Daily.  Dispense: 90 tablet; Refill: 3    8. Gastroesophageal reflux disease, unspecified whether esophagitis present  -     omeprazole (priLOSEC) 20 MG capsule; Take 1 capsule by mouth Daily.  Dispense: 90 capsule; Refill: 3        Follow Up:  Return in about 6 months (around 7/20/2023) for Recheck.     An After Visit Summary and PPPS were given to the patient.

## 2023-01-25 DIAGNOSIS — M54.42 CHRONIC BILATERAL LOW BACK PAIN WITH BILATERAL SCIATICA: ICD-10-CM

## 2023-01-25 DIAGNOSIS — M51.36 DDD (DEGENERATIVE DISC DISEASE), LUMBAR: ICD-10-CM

## 2023-01-25 DIAGNOSIS — M43.16 SPONDYLOLISTHESIS OF LUMBAR REGION: ICD-10-CM

## 2023-01-25 DIAGNOSIS — M48.062 LUMBAR STENOSIS WITH NEUROGENIC CLAUDICATION: ICD-10-CM

## 2023-01-25 DIAGNOSIS — M15.9 GENERALIZED OSTEOARTHROSIS, INVOLVING MULTIPLE SITES: ICD-10-CM

## 2023-01-25 DIAGNOSIS — G89.29 CHRONIC BILATERAL LOW BACK PAIN WITH BILATERAL SCIATICA: ICD-10-CM

## 2023-01-25 DIAGNOSIS — M54.41 CHRONIC BILATERAL LOW BACK PAIN WITH BILATERAL SCIATICA: ICD-10-CM

## 2023-01-25 RX ORDER — TRAMADOL HYDROCHLORIDE 50 MG/1
50 TABLET ORAL EVERY 6 HOURS PRN
Qty: 360 TABLET | Refills: 1 | Status: CANCELLED | OUTPATIENT
Start: 2023-01-25

## 2023-01-27 ENCOUNTER — LAB (OUTPATIENT)
Dept: LAB | Facility: HOSPITAL | Age: 73
End: 2023-01-27
Payer: MEDICARE

## 2023-01-27 DIAGNOSIS — F41.1 GAD (GENERALIZED ANXIETY DISORDER): ICD-10-CM

## 2023-01-27 DIAGNOSIS — Z98.84 HISTORY OF BARIATRIC SURGERY: ICD-10-CM

## 2023-01-27 DIAGNOSIS — Z51.81 MEDICATION MONITORING ENCOUNTER: ICD-10-CM

## 2023-01-27 DIAGNOSIS — E66.01 CLASS 3 SEVERE OBESITY DUE TO EXCESS CALORIES WITHOUT SERIOUS COMORBIDITY WITH BODY MASS INDEX (BMI) OF 40.0 TO 44.9 IN ADULT: ICD-10-CM

## 2023-01-27 DIAGNOSIS — K21.9 GASTROESOPHAGEAL REFLUX DISEASE, UNSPECIFIED WHETHER ESOPHAGITIS PRESENT: ICD-10-CM

## 2023-01-27 LAB
ALBUMIN SERPL-MCNC: 3.9 G/DL (ref 3.5–5.2)
ALBUMIN/GLOB SERPL: 1.2 G/DL
ALP SERPL-CCNC: 100 U/L (ref 39–117)
ALT SERPL W P-5'-P-CCNC: 17 U/L (ref 1–33)
ANION GAP SERPL CALCULATED.3IONS-SCNC: 10.2 MMOL/L (ref 5–15)
AST SERPL-CCNC: 24 U/L (ref 1–32)
BILIRUB SERPL-MCNC: 0.5 MG/DL (ref 0–1.2)
BUN SERPL-MCNC: 13 MG/DL (ref 8–23)
BUN/CREAT SERPL: 15.5 (ref 7–25)
CALCIUM SPEC-SCNC: 9.6 MG/DL (ref 8.6–10.5)
CHLORIDE SERPL-SCNC: 104 MMOL/L (ref 98–107)
CHOLEST SERPL-MCNC: 153 MG/DL (ref 0–200)
CO2 SERPL-SCNC: 28.8 MMOL/L (ref 22–29)
CREAT SERPL-MCNC: 0.84 MG/DL (ref 0.57–1)
DEPRECATED RDW RBC AUTO: 40.5 FL (ref 37–54)
EGFRCR SERPLBLD CKD-EPI 2021: 73.9 ML/MIN/1.73
ERYTHROCYTE [DISTWIDTH] IN BLOOD BY AUTOMATED COUNT: 12.9 % (ref 12.3–15.4)
GLOBULIN UR ELPH-MCNC: 3.2 GM/DL
GLUCOSE SERPL-MCNC: 94 MG/DL (ref 65–99)
HCT VFR BLD AUTO: 40.2 % (ref 34–46.6)
HDLC SERPL-MCNC: 62 MG/DL (ref 40–60)
HGB BLD-MCNC: 13.3 G/DL (ref 12–15.9)
LDLC SERPL CALC-MCNC: 76 MG/DL (ref 0–100)
LDLC/HDLC SERPL: 1.21 {RATIO}
MCH RBC QN AUTO: 28.1 PG (ref 26.6–33)
MCHC RBC AUTO-ENTMCNC: 33.1 G/DL (ref 31.5–35.7)
MCV RBC AUTO: 85 FL (ref 79–97)
PLATELET # BLD AUTO: 252 10*3/MM3 (ref 140–450)
PMV BLD AUTO: 11.5 FL (ref 6–12)
POTASSIUM SERPL-SCNC: 4.3 MMOL/L (ref 3.5–5.2)
PROT SERPL-MCNC: 7.1 G/DL (ref 6–8.5)
RBC # BLD AUTO: 4.73 10*6/MM3 (ref 3.77–5.28)
SODIUM SERPL-SCNC: 143 MMOL/L (ref 136–145)
T4 FREE SERPL-MCNC: 1.42 NG/DL (ref 0.93–1.7)
TRIGL SERPL-MCNC: 81 MG/DL (ref 0–150)
TSH SERPL DL<=0.05 MIU/L-ACNC: 2.6 UIU/ML (ref 0.27–4.2)
VLDLC SERPL-MCNC: 15 MG/DL (ref 5–40)
WBC NRBC COR # BLD: 7.52 10*3/MM3 (ref 3.4–10.8)

## 2023-01-27 PROCEDURE — 84443 ASSAY THYROID STIM HORMONE: CPT

## 2023-01-27 PROCEDURE — 80061 LIPID PANEL: CPT

## 2023-01-27 PROCEDURE — 84439 ASSAY OF FREE THYROXINE: CPT

## 2023-01-27 PROCEDURE — 85027 COMPLETE CBC AUTOMATED: CPT

## 2023-01-27 PROCEDURE — 80053 COMPREHEN METABOLIC PANEL: CPT

## 2023-01-27 PROCEDURE — 36415 COLL VENOUS BLD VENIPUNCTURE: CPT

## 2023-02-01 ENCOUNTER — TELEPHONE (OUTPATIENT)
Dept: FAMILY MEDICINE CLINIC | Facility: CLINIC | Age: 73
End: 2023-02-01

## 2023-02-01 NOTE — TELEPHONE ENCOUNTER
Pharmacy Name: [facility] - [facility] [address] [facility] [phone] [facility] [phone]      Pharmacy representative name: SAAD KE    Pharmacy representative phone number: [phone]    What medication are you calling in regards to: TRAMADOL    What question does the pharmacy have: PHARMACY HAD SENT A REQUEST FOR INFORMATION VERIFYING IF PATIENT HAD BEEN PROVIDED INFORMATION ABOUT PROPER USE OF OPIODS. THEY DO NOT SHOW A HISTORY OF OPIODS HOWEVER WE HAD PROVIDED A PRESCRIPTION  QUANTITY TRAMADOL. PLEASE CALL TO VERIFY IF THIS HAS BEEN DONE, OR IF PATIENT HAS ANOTHER HISTORY WITH OPIODS. REFERENCE [id number].    Who is the provider that prescribed the medication: DR. ARROYO

## 2023-02-02 DIAGNOSIS — G89.29 CHRONIC BILATERAL LOW BACK PAIN WITH BILATERAL SCIATICA: ICD-10-CM

## 2023-02-02 DIAGNOSIS — M48.062 LUMBAR STENOSIS WITH NEUROGENIC CLAUDICATION: ICD-10-CM

## 2023-02-02 DIAGNOSIS — M15.9 GENERALIZED OSTEOARTHROSIS, INVOLVING MULTIPLE SITES: ICD-10-CM

## 2023-02-02 DIAGNOSIS — M54.42 CHRONIC BILATERAL LOW BACK PAIN WITH BILATERAL SCIATICA: ICD-10-CM

## 2023-02-02 DIAGNOSIS — M54.41 CHRONIC BILATERAL LOW BACK PAIN WITH BILATERAL SCIATICA: ICD-10-CM

## 2023-02-02 DIAGNOSIS — M43.16 SPONDYLOLISTHESIS OF LUMBAR REGION: ICD-10-CM

## 2023-02-02 DIAGNOSIS — M51.36 DDD (DEGENERATIVE DISC DISEASE), LUMBAR: ICD-10-CM

## 2023-02-02 RX ORDER — TRAMADOL HYDROCHLORIDE 50 MG/1
50 TABLET ORAL EVERY 6 HOURS PRN
Qty: 270 TABLET | Refills: 1 | Status: SHIPPED | OUTPATIENT
Start: 2023-02-02

## 2023-02-02 NOTE — TELEPHONE ENCOUNTER
Spoke with pharmacy representative- they aren't comfortable dispensing such a large quantity of Tramadol (# 360), they recc a quantity of 90 for a 90 day supply since she takes it sparingly according to their records. Her last refill was 03/27/22 for a quantity of 270.

## 2023-06-22 ENCOUNTER — TELEPHONE (OUTPATIENT)
Dept: FAMILY MEDICINE CLINIC | Facility: CLINIC | Age: 73
End: 2023-06-22

## 2023-06-22 NOTE — TELEPHONE ENCOUNTER
Patient is requesting to have an order sent to  for her mammogram.  Since she has had some abnormal readings in the past she will need one for a Bilateral diagnostic mammogram and another for a right diagnostic ultrasound sent via fax to [phone]

## 2023-10-26 ENCOUNTER — TELEPHONE (OUTPATIENT)
Dept: FAMILY MEDICINE CLINIC | Facility: CLINIC | Age: 73
End: 2023-10-26

## 2023-10-26 DIAGNOSIS — M51.36 DDD (DEGENERATIVE DISC DISEASE), LUMBAR: ICD-10-CM

## 2023-10-26 DIAGNOSIS — M48.062 LUMBAR STENOSIS WITH NEUROGENIC CLAUDICATION: ICD-10-CM

## 2023-10-26 DIAGNOSIS — M54.41 CHRONIC BILATERAL LOW BACK PAIN WITH BILATERAL SCIATICA: ICD-10-CM

## 2023-10-26 DIAGNOSIS — M43.16 SPONDYLOLISTHESIS OF LUMBAR REGION: ICD-10-CM

## 2023-10-26 DIAGNOSIS — M54.42 CHRONIC BILATERAL LOW BACK PAIN WITH BILATERAL SCIATICA: ICD-10-CM

## 2023-10-26 DIAGNOSIS — M15.9 GENERALIZED OSTEOARTHROSIS, INVOLVING MULTIPLE SITES: ICD-10-CM

## 2023-10-26 DIAGNOSIS — G89.29 CHRONIC BILATERAL LOW BACK PAIN WITH BILATERAL SCIATICA: ICD-10-CM

## 2023-10-26 RX ORDER — GABAPENTIN 300 MG/1
300 CAPSULE ORAL 3 TIMES DAILY
Qty: 270 CAPSULE | Refills: 1 | Status: SHIPPED | OUTPATIENT
Start: 2023-10-26

## 2023-10-26 NOTE — TELEPHONE ENCOUNTER
THE PATIENT IS CALLING TO SEE WHEN HER GABAPENTIN REFILL WILL BE CALLED IN TO [facility].    THE PATIENT STATES SHE WILL RUN OUT IN ABOUT A WEEK AND 1/2   NO CALL BACK REQUESTED.

## 2023-11-15 ENCOUNTER — OFFICE VISIT (OUTPATIENT)
Dept: FAMILY MEDICINE CLINIC | Facility: CLINIC | Age: 73
End: 2023-11-15
Payer: MEDICARE

## 2023-11-15 VITALS
WEIGHT: 232.2 LBS | OXYGEN SATURATION: 94 % | HEART RATE: 70 BPM | BODY MASS INDEX: 45.59 KG/M2 | RESPIRATION RATE: 16 BRPM | HEIGHT: 60 IN | SYSTOLIC BLOOD PRESSURE: 132 MMHG | DIASTOLIC BLOOD PRESSURE: 74 MMHG

## 2023-11-15 DIAGNOSIS — E66.01 CLASS 3 SEVERE OBESITY DUE TO EXCESS CALORIES WITHOUT SERIOUS COMORBIDITY WITH BODY MASS INDEX (BMI) OF 40.0 TO 44.9 IN ADULT: ICD-10-CM

## 2023-11-15 DIAGNOSIS — M54.41 CHRONIC BILATERAL LOW BACK PAIN WITH BILATERAL SCIATICA: Primary | ICD-10-CM

## 2023-11-15 DIAGNOSIS — G89.29 CHRONIC BILATERAL LOW BACK PAIN WITH BILATERAL SCIATICA: Primary | ICD-10-CM

## 2023-11-15 DIAGNOSIS — M43.16 SPONDYLOLISTHESIS OF LUMBAR REGION: ICD-10-CM

## 2023-11-15 DIAGNOSIS — M54.42 CHRONIC BILATERAL LOW BACK PAIN WITH BILATERAL SCIATICA: Primary | ICD-10-CM

## 2023-11-15 DIAGNOSIS — K21.9 GASTROESOPHAGEAL REFLUX DISEASE, UNSPECIFIED WHETHER ESOPHAGITIS PRESENT: ICD-10-CM

## 2023-11-15 DIAGNOSIS — F41.1 GAD (GENERALIZED ANXIETY DISORDER): ICD-10-CM

## 2023-11-15 DIAGNOSIS — M51.36 DDD (DEGENERATIVE DISC DISEASE), LUMBAR: ICD-10-CM

## 2023-11-15 DIAGNOSIS — M48.062 LUMBAR STENOSIS WITH NEUROGENIC CLAUDICATION: ICD-10-CM

## 2023-11-15 DIAGNOSIS — I77.6 VASCULITIS: ICD-10-CM

## 2023-11-15 DIAGNOSIS — M15.9 GENERALIZED OSTEOARTHROSIS, INVOLVING MULTIPLE SITES: ICD-10-CM

## 2023-11-15 PROCEDURE — 1160F RVW MEDS BY RX/DR IN RCRD: CPT | Performed by: FAMILY MEDICINE

## 2023-11-15 PROCEDURE — 1159F MED LIST DOCD IN RCRD: CPT | Performed by: FAMILY MEDICINE

## 2023-11-15 PROCEDURE — 99214 OFFICE O/P EST MOD 30 MIN: CPT | Performed by: FAMILY MEDICINE

## 2023-11-15 RX ORDER — SERTRALINE HYDROCHLORIDE 25 MG/1
25 TABLET, FILM COATED ORAL DAILY
Qty: 90 TABLET | Refills: 3 | Status: SHIPPED | OUTPATIENT
Start: 2023-11-15

## 2023-11-15 RX ORDER — TIZANIDINE 2 MG/1
2 TABLET ORAL 3 TIMES DAILY
Qty: 270 TABLET | Refills: 3 | Status: SHIPPED | OUTPATIENT
Start: 2023-11-15

## 2023-11-15 RX ORDER — MELOXICAM 15 MG/1
15 TABLET ORAL DAILY
Qty: 90 TABLET | Refills: 3 | Status: SHIPPED | OUTPATIENT
Start: 2023-11-15

## 2023-11-15 RX ORDER — PREDNISONE 10 MG/1
TABLET ORAL
Qty: 30 TABLET | Refills: 0 | Status: SHIPPED | OUTPATIENT
Start: 2023-11-15

## 2023-11-15 RX ORDER — OMEPRAZOLE 20 MG/1
20 CAPSULE, DELAYED RELEASE ORAL DAILY
Qty: 90 CAPSULE | Refills: 3 | Status: SHIPPED | OUTPATIENT
Start: 2023-11-15

## 2023-11-15 RX ORDER — TRAMADOL HYDROCHLORIDE 50 MG/1
50 TABLET ORAL EVERY 6 HOURS PRN
Qty: 270 TABLET | Refills: 1 | Status: SHIPPED | OUTPATIENT
Start: 2023-11-15 | End: 2023-11-16 | Stop reason: SDUPTHER

## 2023-11-15 NOTE — PROGRESS NOTES
Subjective   Casandra Trinidad is a 73 y.o. female      Chief Complaint  Chronic back pain,  Lumbar stenosis with neurogenic claudication,  Osteoarthritis, generalized  GERD  Anxiety disorder  Obesity       Anxiety  Patient reports no chest pain, confusion, decreased concentration, dizziness, nausea, nervous/anxious behavior, shortness of breath or suicidal ideas.       Osteoarthritis  Associated symptoms include rash. Pertinent negatives include no diarrhea, fatigue or fever. Her past medical history is significant for osteoarthritis.   Fall  Pertinent negatives include no abdominal pain, fever, headaches, nausea or numbness.     The patient presents today for a 6-month follow-up visit. She is due for refills on gabapentin, meloxicam, omeprazole, sertraline, tizanidine, and tramadol. These all need to be sent to her mail order pharmacy, [facility]. She is accompanied by her youngest granddaughter.    The patient is unable to stay awake and has had a sleep study a long time ago. She takes gabapentin 1 tablet 2 times a day.    The patient has a tender spot on her lower extremity for approximately 6 months or longer that intermittently blisters and then pops. She has taken prednisone in the past.    The patient admits she has a terrible memory. She thought it could be Alzheimer's, but Dr. Garcia' daughter told her that it was not Alzheimer's. She reports a family history of Alzheimer's.        The following portions of the patient's history were reviewed and updated as appropriate: allergies, current medications, past social history and problem list.    Review of Systems   Constitutional: Negative.  Negative for appetite change, fatigue, fever and unexpected weight change.   Respiratory: Negative.  Negative for chest tightness and shortness of breath.    Cardiovascular:  Negative for chest pain.   Gastrointestinal: Negative.  Negative for abdominal distention, abdominal pain, diarrhea and nausea.        Patient  experiencing heartburn/acid reflux     Musculoskeletal:  Positive for back pain. Negative for arthralgias, gait problem and myalgias.   Skin:  Positive for color change and rash. Negative for pallor and wound.   Neurological:  Negative for dizziness, tremors, weakness, light-headedness, numbness and headaches.   Psychiatric/Behavioral:  Positive for sleep disturbance. Negative for agitation, behavioral problems, confusion, decreased concentration, dysphoric mood and suicidal ideas. The patient is not nervous/anxious.          Objective         Vitals:    11/15/23 1104   BP: 132/74   Pulse: 70   Resp: 16   SpO2: 94%         Physical Exam  Vitals and nursing note reviewed.   Constitutional:       General: She is not in acute distress.     Appearance: Normal appearance. She is well-developed. She is not ill-appearing, toxic-appearing or diaphoretic.   HENT:      Head: Normocephalic and atraumatic.   Eyes:      General: No scleral icterus.     Conjunctiva/sclera: Conjunctivae normal.      Pupils: Pupils are equal, round, and reactive to light.   Cardiovascular:      Rate and Rhythm: Normal rate and regular rhythm.      Heart sounds: Normal heart sounds.   Pulmonary:      Effort: Pulmonary effort is normal. No respiratory distress.      Breath sounds: Normal breath sounds. No wheezing or rales.   Abdominal:      General: Bowel sounds are normal. There is no distension.      Palpations: Abdomen is soft. There is no mass.      Tenderness: There is no abdominal tenderness. There is no guarding or rebound.      Hernia: No hernia is present.   Musculoskeletal:      Lumbar back: Tenderness and bony tenderness present. No swelling, deformity or spasms. Decreased range of motion.   Skin:     General: Skin is warm and dry.      Coloration: Skin is not jaundiced or pale.      Findings: Erythema present. No rash.      Comments: Dime sized area of erythema right medial lower leg blanches with pressure and is slightly tender.    Neurological:      Mental Status: She is alert and oriented to person, place, and time.      Deep Tendon Reflexes: Reflexes are normal and symmetric.   Psychiatric:         Mood and Affect: Mood normal.         Behavior: Behavior normal.              No results were obtained or interpreted today.      Assessment & Plan     Problems Addressed this Visit          Gastrointestinal Abdominal     Esophageal reflux    Relevant Medications    omeprazole (priLOSEC) 20 MG capsule       Mental Health    CLAUDIA (generalized anxiety disorder)    Relevant Medications    sertraline (ZOLOFT) 25 MG tablet       Musculoskeletal and Injuries    Generalized osteoarthrosis, involving multiple sites    Relevant Medications    meloxicam (MOBIC) 15 MG tablet    tiZANidine (ZANAFLEX) 2 MG tablet    traMADol (ULTRAM) 50 MG tablet    Spondylolisthesis of lumbar region    Relevant Medications    meloxicam (MOBIC) 15 MG tablet    tiZANidine (ZANAFLEX) 2 MG tablet    traMADol (ULTRAM) 50 MG tablet       Neuro    Lumbar stenosis with neurogenic claudication    Relevant Medications    meloxicam (MOBIC) 15 MG tablet    tiZANidine (ZANAFLEX) 2 MG tablet    traMADol (ULTRAM) 50 MG tablet    DDD (degenerative disc disease), lumbar    Relevant Medications    meloxicam (MOBIC) 15 MG tablet    tiZANidine (ZANAFLEX) 2 MG tablet    traMADol (ULTRAM) 50 MG tablet     Other Visit Diagnoses       Chronic bilateral low back pain with bilateral sciatica    -  Primary    Relevant Medications    meloxicam (MOBIC) 15 MG tablet    tiZANidine (ZANAFLEX) 2 MG tablet    traMADol (ULTRAM) 50 MG tablet    Class 3 severe obesity due to excess calories without serious comorbidity with body mass index (BMI) of 40.0 to 44.9 in adult        Vasculitis              Diagnoses         Codes Comments    Chronic bilateral low back pain with bilateral sciatica    -  Primary ICD-10-CM: M54.42, M54.41, G89.29  ICD-9-CM: 724.2, 724.3, 338.29     Lumbar stenosis with neurogenic  claudication     ICD-10-CM: M48.062  ICD-9-CM: 724.03     Spondylolisthesis of lumbar region     ICD-10-CM: M43.16  ICD-9-CM: 738.4     DDD (degenerative disc disease), lumbar     ICD-10-CM: M51.36  ICD-9-CM: 722.52     Generalized osteoarthrosis, involving multiple sites     ICD-10-CM: M15.9  ICD-9-CM: 715.09     Gastroesophageal reflux disease, unspecified whether esophagitis present     ICD-10-CM: K21.9  ICD-9-CM: 530.81     CLAUDIA (generalized anxiety disorder)     ICD-10-CM: F41.1  ICD-9-CM: 300.02     Class 3 severe obesity due to excess calories without serious comorbidity with body mass index (BMI) of 40.0 to 44.9 in adult     ICD-10-CM: E66.01, Z68.41  ICD-9-CM: 278.01, V85.41     Vasculitis     ICD-10-CM: I77.6  ICD-9-CM: 447.6             Plan    Gradually wean off gabapentin over 4 to 6 weeks.    Consider weaning off any other sedating medications such as tizanidine if not better after weaning off the gabapentin.    Refill medications as requested.  All to be sent to [facility].    Treat area of vasculitis with topical mupirocin and prednisone taper.    I spent 40 minutes in patient care: Reviewing records prior to the visit, examining the patient, entering orders and documentation    Part of this note may be an electronic transcription/translation of spoken language to printed text using the Dragon Dictation System.          Transcribed from ambient dictation for SOURAV Melton MD by Mariana Echevarria.  11/15/23   14:11 EST    Patient or patient representative verbalized consent to the visit recording.  I have personally performed the services described in this document as transcribed by the above individual, and it is both accurate and complete.

## 2023-11-16 DIAGNOSIS — M15.9 GENERALIZED OSTEOARTHROSIS, INVOLVING MULTIPLE SITES: ICD-10-CM

## 2023-11-16 DIAGNOSIS — M43.16 SPONDYLOLISTHESIS OF LUMBAR REGION: ICD-10-CM

## 2023-11-16 DIAGNOSIS — G89.29 CHRONIC BILATERAL LOW BACK PAIN WITH BILATERAL SCIATICA: ICD-10-CM

## 2023-11-16 DIAGNOSIS — M54.42 CHRONIC BILATERAL LOW BACK PAIN WITH BILATERAL SCIATICA: ICD-10-CM

## 2023-11-16 DIAGNOSIS — M48.062 LUMBAR STENOSIS WITH NEUROGENIC CLAUDICATION: ICD-10-CM

## 2023-11-16 DIAGNOSIS — M54.41 CHRONIC BILATERAL LOW BACK PAIN WITH BILATERAL SCIATICA: ICD-10-CM

## 2023-11-16 DIAGNOSIS — M51.36 DDD (DEGENERATIVE DISC DISEASE), LUMBAR: ICD-10-CM

## 2023-11-16 RX ORDER — TRAMADOL HYDROCHLORIDE 50 MG/1
50 TABLET ORAL EVERY 6 HOURS PRN
Start: 2023-11-16

## 2023-11-16 NOTE — TELEPHONE ENCOUNTER
No need to e-scribe this, it's been faxed back. Just updating chart, but you have to sign off on it.

## 2023-11-21 ENCOUNTER — TELEPHONE (OUTPATIENT)
Dept: FAMILY MEDICINE CLINIC | Facility: CLINIC | Age: 73
End: 2023-11-21

## 2023-11-21 NOTE — TELEPHONE ENCOUNTER
Caller: Casandra Trinidad    Relationship: Self    Best call back number: [phone]     What was the call regarding: PATIENT CALLED ASKING HOW LONG SHE NEEDS TO USE MUPIROCIN.

## 2024-04-17 ENCOUNTER — OFFICE VISIT (OUTPATIENT)
Dept: FAMILY MEDICINE CLINIC | Facility: CLINIC | Age: 74
End: 2024-04-17
Payer: MEDICARE

## 2024-04-17 VITALS
WEIGHT: 220.2 LBS | TEMPERATURE: 98 F | OXYGEN SATURATION: 95 % | RESPIRATION RATE: 16 BRPM | BODY MASS INDEX: 43.23 KG/M2 | HEART RATE: 72 BPM | DIASTOLIC BLOOD PRESSURE: 72 MMHG | HEIGHT: 60 IN | SYSTOLIC BLOOD PRESSURE: 130 MMHG

## 2024-04-17 DIAGNOSIS — G89.29 CHRONIC BILATERAL LOW BACK PAIN WITH BILATERAL SCIATICA: ICD-10-CM

## 2024-04-17 DIAGNOSIS — Z96.653 HISTORY OF BILATERAL KNEE REPLACEMENT: ICD-10-CM

## 2024-04-17 DIAGNOSIS — M48.062 LUMBAR STENOSIS WITH NEUROGENIC CLAUDICATION: ICD-10-CM

## 2024-04-17 DIAGNOSIS — Z00.00 MEDICARE ANNUAL WELLNESS VISIT, SUBSEQUENT: Primary | ICD-10-CM

## 2024-04-17 DIAGNOSIS — Z98.84 HISTORY OF BARIATRIC SURGERY: ICD-10-CM

## 2024-04-17 DIAGNOSIS — F41.1 GAD (GENERALIZED ANXIETY DISORDER): ICD-10-CM

## 2024-04-17 DIAGNOSIS — M54.41 CHRONIC BILATERAL LOW BACK PAIN WITH BILATERAL SCIATICA: ICD-10-CM

## 2024-04-17 DIAGNOSIS — M15.9 GENERALIZED OSTEOARTHROSIS, INVOLVING MULTIPLE SITES: ICD-10-CM

## 2024-04-17 DIAGNOSIS — Z51.81 MEDICATION MONITORING ENCOUNTER: ICD-10-CM

## 2024-04-17 DIAGNOSIS — M51.36 DDD (DEGENERATIVE DISC DISEASE), LUMBAR: ICD-10-CM

## 2024-04-17 DIAGNOSIS — E66.01 CLASS 3 SEVERE OBESITY DUE TO EXCESS CALORIES WITHOUT SERIOUS COMORBIDITY WITH BODY MASS INDEX (BMI) OF 40.0 TO 44.9 IN ADULT: ICD-10-CM

## 2024-04-17 DIAGNOSIS — M54.42 CHRONIC BILATERAL LOW BACK PAIN WITH BILATERAL SCIATICA: ICD-10-CM

## 2024-04-17 DIAGNOSIS — M43.16 SPONDYLOLISTHESIS OF LUMBAR REGION: ICD-10-CM

## 2024-04-17 DIAGNOSIS — K21.9 GASTROESOPHAGEAL REFLUX DISEASE, UNSPECIFIED WHETHER ESOPHAGITIS PRESENT: ICD-10-CM

## 2024-04-17 DIAGNOSIS — E78.00 ELEVATED CHOLESTEROL: ICD-10-CM

## 2024-04-17 PROCEDURE — 1159F MED LIST DOCD IN RCRD: CPT | Performed by: FAMILY MEDICINE

## 2024-04-17 PROCEDURE — 1170F FXNL STATUS ASSESSED: CPT | Performed by: FAMILY MEDICINE

## 2024-04-17 PROCEDURE — 1160F RVW MEDS BY RX/DR IN RCRD: CPT | Performed by: FAMILY MEDICINE

## 2024-04-17 PROCEDURE — G0439 PPPS, SUBSEQ VISIT: HCPCS | Performed by: FAMILY MEDICINE

## 2024-04-17 RX ORDER — TRAMADOL HYDROCHLORIDE 50 MG/1
50 TABLET ORAL EVERY 6 HOURS PRN
Qty: 180 TABLET | Refills: 1 | Status: SHIPPED | OUTPATIENT
Start: 2024-04-17

## 2024-04-18 NOTE — PROGRESS NOTES
The ABCs of the Annual Wellness Visit  Subsequent Medicare Wellness Visit    Chief Complaint   Patient presents with    Medicare Wellness-subsequent      Subjective   History of Present Illness:  Casandra Trinidad is a 74 y.o. female who presents for a Subsequent Medicare Wellness Visit.    The following portions of the patient's history were reviewed and   updated as appropriate: allergies, current medications, past family history, past medical history, past social history, past surgical history, and problem list.    Compared to one year ago, the patient feels her physical   health is the same.    Compared to one year ago, the patient feels her mental   health is the same.    Recent Hospitalizations:  She was not admitted to the hospital during the last year.       Current Medical Providers:  Patient Care Team:  Tony Melton MD as PCP - General (Family Medicine)  Sachin Schumacher MD PhD as Consulting Physician (Orthopedic Surgery)  Gagandeep Dc MD as Consulting Physician (Pain Medicine)  Olive Zhou APRN as Nurse Practitioner (Nurse Practitioner)  Cira Ortega MD as Consulting Physician (Obstetrics and Gynecology)  Tony Melton MD as Referring Physician (Family Medicine)    Outpatient Medications Prior to Visit   Medication Sig Dispense Refill    aspirin 81 MG EC tablet Take 1 tablet by mouth Daily.      Calcium Carbonate-Vitamin D 600-200 MG-UNIT tablet Take 1 tablet by mouth 2 (two) times a day.      meloxicam (MOBIC) 15 MG tablet Take 1 tablet by mouth Daily. 90 tablet 3    Multiple Vitamins-Minerals (ICAPS AREDS 2 PO) Take 1 capsule by mouth every night.      mupirocin (BACTROBAN) 2 % ointment Apply 1 application  topically to the appropriate area as directed 3 (Three) Times a Day. 30 g 3    omeprazole (priLOSEC) 20 MG capsule Take 1 capsule by mouth Daily. 90 capsule 3    sertraline (ZOLOFT) 25 MG tablet Take 1 tablet by mouth Daily. 90 tablet 3     tiZANidine (ZANAFLEX) 2 MG tablet Take 1 tablet by mouth 3 (Three) Times a Day. 270 tablet 3    gabapentin (NEURONTIN) 300 MG capsule Take 1 capsule by mouth 3 (Three) Times a Day. 270 capsule 1    meclizine (ANTIVERT) 25 MG tablet Take 1 tablet by mouth 3 (Three) Times a Day As Needed for Dizziness. (Patient not taking: Reported on 11/15/2023) 90 tablet 3    predniSONE (DELTASONE) 10 MG tablet 3 tablets each morning for 5 days then 2 tablets each morning for 5 days then 1 tablet each morning for 5 days then stop 30 tablet 0    traMADol (ULTRAM) 50 MG tablet Take 1 tablet by mouth Every 6 (Six) Hours As Needed for Moderate Pain.       No facility-administered medications prior to visit.       Opioid medication/s are on active medication list.  and I have evaluated her active treatment plan and pain score trends (see table).  There were no vitals filed for this visit.  I have reviewed the chart for potential of high risk medication and harmful drug interactions in the elderly.          Aspirin is on active medication list. Aspirin use is indicated based on review of current medical condition/s. Pros and cons of this therapy have been discussed today. Benefits of this medication outweigh potential harm.  Patient has been encouraged to continue taking this medication.  .      Patient Active Problem List   Diagnosis    Menopause    Generalized osteoarthrosis, involving multiple sites    Esophageal reflux    CLAUDIA (generalized anxiety disorder)    Spondylosis without myelopathy or radiculopathy, lumbar region    Lumbar stenosis with neurogenic claudication    Spondylolisthesis of lumbar region    DDD (degenerative disc disease), lumbar    Morbid obesity due to excess calories    History of bilateral knee replacement    History of bariatric surgery    Physical deconditioning    PMB (postmenopausal bleeding)     Advance Care Planning  ACP discussion was declined by the patient.      Review of Systems      Objective     "  Vitals:    24 1103   BP: 130/72   Pulse: 72   Resp: 16   Temp: 98 °F (36.7 °C)   SpO2: 95%   Weight: 99.9 kg (220 lb 3.2 oz)   Height: 152.4 cm (60\")     BMI Readings from Last 1 Encounters:   24 43.00 kg/m²   BMI is above normal parameters. Recommendations include: nutrition counseling    Does the patient have evidence of cognitive impairment? No    Physical Exam            HEALTH RISK ASSESSMENT    Smoking Status:  Social History     Tobacco Use   Smoking Status Never   Smokeless Tobacco Never     Alcohol Consumption:  Social History     Substance and Sexual Activity   Alcohol Use No     Fall Risk Screen:    MELLY Fall Risk Assessment was completed, and patient is at LOW risk for falls.Assessment completed on:2024    Depression Screenin/17/2024    11:10 AM   PHQ-2/PHQ-9 Depression Screening   Little Interest or Pleasure in Doing Things 0-->not at all   Feeling Down, Depressed or Hopeless 0-->not at all   PHQ-9: Brief Depression Severity Measure Score 0       Health Habits and Functional and Cognitive Screenin/17/2024    11:10 AM   Functional & Cognitive Status   Do you have difficulty preparing food and eating? No   Do you have difficulty bathing yourself, getting dressed or grooming yourself? No   Do you have difficulty using the toilet? No   Do you have difficulty moving around from place to place? No   Do you have trouble with steps or getting out of a bed or a chair? No   Current Diet Well Balanced Diet   Dental Exam Up to date   Eye Exam Up to date   Exercise (times per week) 0 times per week   Current Exercises Include No Regular Exercise   Do you need help using the phone?  No   Are you deaf or do you have serious difficulty hearing?  No   Do you need help to go to places out of walking distance? No   Do you need help shopping? No   Do you need help preparing meals?  No   Do you need help with housework?  No   Do you need help with laundry? No   Do you need help taking " your medications? No   Do you need help managing money? No   Do you ever drive or ride in a car without wearing a seat belt? No   Have you felt unusual stress, anger or loneliness in the last month? No   Who do you live with? Alone   If you need help, do you have trouble finding someone available to you? No   Have you been bothered in the last four weeks by sexual problems? No   Do you have difficulty concentrating, remembering or making decisions? No       Age-appropriate Screening Schedule:  Refer to the list below for future screening recommendations based on patient's age, sex and/or medical conditions. Orders for these recommended tests are listed in the plan section. The patient has been provided with a written plan.    Health Maintenance   Topic Date Due    DXA SCAN  Never done    BMI FOLLOWUP  01/20/2024    LIPID PANEL  01/27/2024    COVID-19 Vaccine (3 - 2023-24 season) 07/07/2024 (Originally 9/1/2023)    RSV Vaccine - Adults (1 - 1-dose 60+ series) 04/17/2025 (Originally 3/31/2010)    INFLUENZA VACCINE  08/01/2024    ANNUAL WELLNESS VISIT  04/17/2025    MAMMOGRAM  07/19/2025    TDAP/TD VACCINES (2 - Td or Tdap) 02/03/2027    COLORECTAL CANCER SCREENING  05/05/2032    HEPATITIS C SCREENING  Completed    Pneumococcal Vaccine 65+  Completed    ZOSTER VACCINE  Completed              Assessment & Plan     CMS Preventative Services Quick Reference  Risk Factors Identified During Encounter  None Identified  The above risks/problems have been discussed with the patient.  Follow up actions/plans if indicated are seen below in the Assessment/Plan Section.  Pertinent information has been shared with the patient in the After Visit Summary.    Diagnoses and all orders for this visit:    1. Medicare annual wellness visit, subsequent (Primary)  -     CBC (No Diff); Future  -     Comprehensive Metabolic Panel; Future    2. Chronic bilateral low back pain with bilateral sciatica  -     traMADol (ULTRAM) 50 MG tablet; Take  1 tablet by mouth Every 6 (Six) Hours As Needed for Moderate Pain.  Dispense: 180 tablet; Refill: 1    3. Lumbar stenosis with neurogenic claudication  -     traMADol (ULTRAM) 50 MG tablet; Take 1 tablet by mouth Every 6 (Six) Hours As Needed for Moderate Pain.  Dispense: 180 tablet; Refill: 1    4. Spondylolisthesis of lumbar region  -     traMADol (ULTRAM) 50 MG tablet; Take 1 tablet by mouth Every 6 (Six) Hours As Needed for Moderate Pain.  Dispense: 180 tablet; Refill: 1    5. DDD (degenerative disc disease), lumbar  -     traMADol (ULTRAM) 50 MG tablet; Take 1 tablet by mouth Every 6 (Six) Hours As Needed for Moderate Pain.  Dispense: 180 tablet; Refill: 1    6. Generalized osteoarthrosis, involving multiple sites  -     traMADol (ULTRAM) 50 MG tablet; Take 1 tablet by mouth Every 6 (Six) Hours As Needed for Moderate Pain.  Dispense: 180 tablet; Refill: 1    7. Gastroesophageal reflux disease, unspecified whether esophagitis present  -     CBC (No Diff); Future  -     Comprehensive Metabolic Panel; Future    8. CLAUDIA (generalized anxiety disorder)  -     Comprehensive Metabolic Panel; Future  -     TSH; Future  -     T4, Free; Future    9. Class 3 severe obesity due to excess calories without serious comorbidity with body mass index (BMI) of 40.0 to 44.9 in adult  -     CBC (No Diff); Future  -     Comprehensive Metabolic Panel; Future  -     TSH; Future  -     T4, Free; Future    10. History of bilateral knee replacement    11. Medication monitoring encounter  -     CBC (No Diff); Future  -     Comprehensive Metabolic Panel; Future    12. History of bariatric surgery  -     CBC (No Diff); Future  -     Comprehensive Metabolic Panel; Future  -     Lipid Panel; Future    13. Elevated cholesterol  -     Comprehensive Metabolic Panel; Future  -     Lipid Panel; Future        Follow Up:  Return in about 6 months (around 10/17/2024) for Recheck.     An After Visit Summary and PPPS were given to the patient.

## 2024-05-01 ENCOUNTER — LAB (OUTPATIENT)
Dept: LAB | Facility: HOSPITAL | Age: 74
End: 2024-05-01
Payer: MEDICARE

## 2024-05-01 DIAGNOSIS — Z98.84 HISTORY OF BARIATRIC SURGERY: ICD-10-CM

## 2024-05-01 DIAGNOSIS — Z00.00 MEDICARE ANNUAL WELLNESS VISIT, SUBSEQUENT: ICD-10-CM

## 2024-05-01 DIAGNOSIS — K21.9 GASTROESOPHAGEAL REFLUX DISEASE, UNSPECIFIED WHETHER ESOPHAGITIS PRESENT: ICD-10-CM

## 2024-05-01 DIAGNOSIS — E66.01 CLASS 3 SEVERE OBESITY DUE TO EXCESS CALORIES WITHOUT SERIOUS COMORBIDITY WITH BODY MASS INDEX (BMI) OF 40.0 TO 44.9 IN ADULT: ICD-10-CM

## 2024-05-01 DIAGNOSIS — F41.1 GAD (GENERALIZED ANXIETY DISORDER): ICD-10-CM

## 2024-05-01 DIAGNOSIS — E78.00 ELEVATED CHOLESTEROL: ICD-10-CM

## 2024-05-01 DIAGNOSIS — Z51.81 MEDICATION MONITORING ENCOUNTER: ICD-10-CM

## 2024-05-01 LAB
ALBUMIN SERPL-MCNC: 3.9 G/DL (ref 3.5–5.2)
ALBUMIN/GLOB SERPL: 1.2 G/DL
ALP SERPL-CCNC: 106 U/L (ref 39–117)
ALT SERPL W P-5'-P-CCNC: 15 U/L (ref 1–33)
ANION GAP SERPL CALCULATED.3IONS-SCNC: 8.8 MMOL/L (ref 5–15)
AST SERPL-CCNC: 26 U/L (ref 1–32)
BILIRUB SERPL-MCNC: 0.5 MG/DL (ref 0–1.2)
BUN SERPL-MCNC: 21 MG/DL (ref 8–23)
BUN/CREAT SERPL: 25 (ref 7–25)
CALCIUM SPEC-SCNC: 9.7 MG/DL (ref 8.6–10.5)
CHLORIDE SERPL-SCNC: 103 MMOL/L (ref 98–107)
CHOLEST SERPL-MCNC: 166 MG/DL (ref 0–200)
CO2 SERPL-SCNC: 28.2 MMOL/L (ref 22–29)
CREAT SERPL-MCNC: 0.84 MG/DL (ref 0.57–1)
DEPRECATED RDW RBC AUTO: 41.3 FL (ref 37–54)
EGFRCR SERPLBLD CKD-EPI 2021: 73 ML/MIN/1.73
ERYTHROCYTE [DISTWIDTH] IN BLOOD BY AUTOMATED COUNT: 13.1 % (ref 12.3–15.4)
GLOBULIN UR ELPH-MCNC: 3.2 GM/DL
GLUCOSE SERPL-MCNC: 87 MG/DL (ref 65–99)
HCT VFR BLD AUTO: 43.3 % (ref 34–46.6)
HDLC SERPL-MCNC: 66 MG/DL (ref 40–60)
HGB BLD-MCNC: 14.2 G/DL (ref 12–15.9)
LDLC SERPL CALC-MCNC: 84 MG/DL (ref 0–100)
LDLC/HDLC SERPL: 1.25 {RATIO}
MCH RBC QN AUTO: 28.6 PG (ref 26.6–33)
MCHC RBC AUTO-ENTMCNC: 32.8 G/DL (ref 31.5–35.7)
MCV RBC AUTO: 87.1 FL (ref 79–97)
PLATELET # BLD AUTO: 249 10*3/MM3 (ref 140–450)
PMV BLD AUTO: 11.6 FL (ref 6–12)
POTASSIUM SERPL-SCNC: 4.7 MMOL/L (ref 3.5–5.2)
PROT SERPL-MCNC: 7.1 G/DL (ref 6–8.5)
RBC # BLD AUTO: 4.97 10*6/MM3 (ref 3.77–5.28)
SODIUM SERPL-SCNC: 140 MMOL/L (ref 136–145)
T4 FREE SERPL-MCNC: 1.43 NG/DL (ref 0.93–1.7)
TRIGL SERPL-MCNC: 87 MG/DL (ref 0–150)
TSH SERPL DL<=0.05 MIU/L-ACNC: 2.18 UIU/ML (ref 0.27–4.2)
VLDLC SERPL-MCNC: 16 MG/DL (ref 5–40)
WBC NRBC COR # BLD AUTO: 6.91 10*3/MM3 (ref 3.4–10.8)

## 2024-05-01 PROCEDURE — 85027 COMPLETE CBC AUTOMATED: CPT

## 2024-05-01 PROCEDURE — 36415 COLL VENOUS BLD VENIPUNCTURE: CPT

## 2024-05-01 PROCEDURE — 84443 ASSAY THYROID STIM HORMONE: CPT

## 2024-05-01 PROCEDURE — 84439 ASSAY OF FREE THYROXINE: CPT

## 2024-05-01 PROCEDURE — 80061 LIPID PANEL: CPT

## 2024-05-01 PROCEDURE — 80053 COMPREHEN METABOLIC PANEL: CPT

## 2024-10-30 ENCOUNTER — OFFICE VISIT (OUTPATIENT)
Dept: FAMILY MEDICINE CLINIC | Facility: CLINIC | Age: 74
End: 2024-10-30
Payer: MEDICARE

## 2024-10-30 VITALS
HEART RATE: 80 BPM | WEIGHT: 219 LBS | DIASTOLIC BLOOD PRESSURE: 88 MMHG | HEIGHT: 60 IN | BODY MASS INDEX: 43 KG/M2 | RESPIRATION RATE: 16 BRPM | TEMPERATURE: 96.4 F | OXYGEN SATURATION: 95 % | SYSTOLIC BLOOD PRESSURE: 142 MMHG

## 2024-10-30 DIAGNOSIS — E66.01 CLASS 3 SEVERE OBESITY DUE TO EXCESS CALORIES WITHOUT SERIOUS COMORBIDITY WITH BODY MASS INDEX (BMI) OF 40.0 TO 44.9 IN ADULT: ICD-10-CM

## 2024-10-30 DIAGNOSIS — M43.16 SPONDYLOLISTHESIS OF LUMBAR REGION: ICD-10-CM

## 2024-10-30 DIAGNOSIS — M51.369 DDD (DEGENERATIVE DISC DISEASE), LUMBAR: ICD-10-CM

## 2024-10-30 DIAGNOSIS — Z98.84 HISTORY OF BARIATRIC SURGERY: ICD-10-CM

## 2024-10-30 DIAGNOSIS — M54.41 CHRONIC BILATERAL LOW BACK PAIN WITH BILATERAL SCIATICA: ICD-10-CM

## 2024-10-30 DIAGNOSIS — M54.42 CHRONIC BILATERAL LOW BACK PAIN WITH BILATERAL SCIATICA: ICD-10-CM

## 2024-10-30 DIAGNOSIS — K21.9 GASTROESOPHAGEAL REFLUX DISEASE, UNSPECIFIED WHETHER ESOPHAGITIS PRESENT: ICD-10-CM

## 2024-10-30 DIAGNOSIS — G89.29 CHRONIC BILATERAL LOW BACK PAIN WITH BILATERAL SCIATICA: ICD-10-CM

## 2024-10-30 DIAGNOSIS — M51.362 DEGENERATION OF INTERVERTEBRAL DISC OF LUMBAR REGION WITH DISCOGENIC BACK PAIN AND LOWER EXTREMITY PAIN: ICD-10-CM

## 2024-10-30 DIAGNOSIS — M15.9 GENERALIZED OSTEOARTHROSIS, INVOLVING MULTIPLE SITES: ICD-10-CM

## 2024-10-30 DIAGNOSIS — F41.1 GAD (GENERALIZED ANXIETY DISORDER): ICD-10-CM

## 2024-10-30 DIAGNOSIS — M48.062 LUMBAR STENOSIS WITH NEUROGENIC CLAUDICATION: Primary | ICD-10-CM

## 2024-10-30 DIAGNOSIS — E66.813 CLASS 3 SEVERE OBESITY DUE TO EXCESS CALORIES WITHOUT SERIOUS COMORBIDITY WITH BODY MASS INDEX (BMI) OF 40.0 TO 44.9 IN ADULT: ICD-10-CM

## 2024-10-30 RX ORDER — MELOXICAM 15 MG/1
15 TABLET ORAL DAILY
Qty: 90 TABLET | Refills: 3 | Status: SHIPPED | OUTPATIENT
Start: 2024-10-30

## 2024-10-30 RX ORDER — TRAMADOL HYDROCHLORIDE 50 MG/1
50 TABLET ORAL EVERY 6 HOURS PRN
Qty: 180 TABLET | Refills: 1 | Status: SHIPPED | OUTPATIENT
Start: 2024-10-30

## 2024-10-30 RX ORDER — SERTRALINE HYDROCHLORIDE 25 MG/1
25 TABLET, FILM COATED ORAL DAILY
Qty: 90 TABLET | Refills: 3 | Status: SHIPPED | OUTPATIENT
Start: 2024-10-30

## 2024-11-01 NOTE — PROGRESS NOTES
Subjective   Casandra Trinidad is a 74 y.o. female    Chief Complaint    Lumbar spondylosis  Lumbar spondylolisthesis  Neurogenic claudication  Morbid obesity  Pruritus    Back Pain  Pertinent negatives include no numbness or weakness.   Itching  Pertinent negatives include no arthralgias, myalgias, numbness or weakness.   History of Present Illness  The patient is a 74-year-old female who presents today complaining of chronic back pain, prescription refills, and itching.    She has well-documented arthritis and spondylolisthesis with mild neurogenic claudication. She remains morbidly obese with a BMI of 42.8 and has had previous bariatric surgeries. She is currently taking tizanidine 2 mg twice a day but often forgets to take it. She experiences leg cramps and has purchased a machine to help manage this, which has improved her leg cramps. However, she still experiences hand and foot drop. She used to take gabapentin, but it did not provide relief. She is also on omeprazole, taking one tablet daily for acid reflux.    She reports itching on her hands and feet for years but cannot identify the cause. There is no rash or other lesions present. She believes the itching may be due to a change in detergent soap, but despite changing her sheets, the itching persists. The itching occurs once or twice a week. She has tried Zyrtec and liquid Benadryl for relief.    Additionally, she reports excessive sweating, to the point where sweat drips from her hair at night. She does not take any multivitamins. She often feels lethargic, spending entire days in bed, and experiences dizziness.    ALLERGIES  She is allergic to CODEINE.      The following portions of the patient's history were reviewed and updated as appropriate: allergies, current medications, past social history and problem list    Review of Systems   Constitutional: Negative.    Respiratory: Negative.     Gastrointestinal: Negative.    Genitourinary: Negative.     Musculoskeletal:  Positive for back pain. Negative for arthralgias, gait problem and myalgias.   Skin:  Positive for itching.   Neurological:  Negative for dizziness, tremors, weakness and numbness.        Itching   Psychiatric/Behavioral:  Negative for behavioral problems and dysphoric mood. The patient is not nervous/anxious.      Objective     Vitals:    10/30/24 0952   BP: 142/88   Pulse: 80   Resp: 16   Temp: 96.4 °F (35.8 °C)   SpO2: 95%       Physical Exam  Vitals and nursing note reviewed.   Constitutional:       Appearance: Normal appearance. She is well-developed. She is obese.   HENT:      Head: Normocephalic and atraumatic.      Mouth/Throat:      Mouth: Mucous membranes are moist.      Pharynx: Oropharynx is clear.   Eyes:      General: No scleral icterus.     Conjunctiva/sclera: Conjunctivae normal.      Pupils: Pupils are equal, round, and reactive to light.   Cardiovascular:      Rate and Rhythm: Normal rate and regular rhythm.   Pulmonary:      Effort: Pulmonary effort is normal.      Breath sounds: Normal breath sounds.   Abdominal:      General: Bowel sounds are normal. There is distension.      Palpations: Abdomen is soft. There is no mass.      Tenderness: There is no abdominal tenderness. There is no rebound.      Hernia: A hernia is present.   Musculoskeletal:      Lumbar back: Tenderness and bony tenderness present. No swelling, deformity or spasms. Decreased range of motion.   Neurological:      Mental Status: She is alert and oriented to person, place, and time.      Deep Tendon Reflexes: Reflexes are normal and symmetric.   Psychiatric:         Mood and Affect: Mood normal.         Behavior: Behavior normal.   Physical Exam  Vital Signs  BMI is 42.8.    Assessment & Plan   Assessment & Plan  1. Chronic Back Pain.  The patient has a history of arthritis and spondylolisthesis with mild neurogenic claudication. She remains morbidly obese with a BMI of 42.8. Tizanidine dosage was adjusted to  4 mg twice daily to help manage her symptoms. A prescription for a 90-day supply of Mobic was also provided.    2. Pruritus.  The itching on her hands and feet could be due to an allergic reaction. She was advised to try Zyrtec or Allegra daily for a couple of weeks to see if the symptoms improve. If the itching subsides, she should continue the medication for 6 to 8 weeks.    3. Medication Management.  A 90-day supply of omeprazole and Zoloft was prescribed. Tramadol was also refilled for a 90-day supply.    Follow-up  Return in 6 months for follow-up.    Problems Addressed this Visit          Endocrine and Metabolic    History of bariatric surgery       Gastrointestinal Abdominal     Esophageal reflux    Relevant Medications    omeprazole (priLOSEC) 20 MG capsule       Mental Health    CLAUDIA (generalized anxiety disorder)    Relevant Medications    sertraline (ZOLOFT) 25 MG tablet       Musculoskeletal and Injuries    Generalized osteoarthrosis, involving multiple sites    Relevant Medications    meloxicam (MOBIC) 15 MG tablet    traMADol (ULTRAM) 50 MG tablet    Spondylolisthesis of lumbar region    Relevant Medications    meloxicam (MOBIC) 15 MG tablet    traMADol (ULTRAM) 50 MG tablet       Neuro    Lumbar stenosis with neurogenic claudication - Primary    Relevant Medications    meloxicam (MOBIC) 15 MG tablet    traMADol (ULTRAM) 50 MG tablet    DDD (degenerative disc disease), lumbar    Relevant Medications    meloxicam (MOBIC) 15 MG tablet    traMADol (ULTRAM) 50 MG tablet     Other Visit Diagnoses       Chronic bilateral low back pain with bilateral sciatica        Relevant Medications    meloxicam (MOBIC) 15 MG tablet    traMADol (ULTRAM) 50 MG tablet    Class 3 severe obesity due to excess calories without serious comorbidity with body mass index (BMI) of 40.0 to 44.9 in adult              Diagnoses         Codes Comments    Lumbar stenosis with neurogenic claudication    -  Primary ICD-10-CM:  M48.062  ICD-9-CM: 724.03     Chronic bilateral low back pain with bilateral sciatica     ICD-10-CM: M54.42, M54.41, G89.29  ICD-9-CM: 724.2, 724.3, 338.29     Spondylolisthesis of lumbar region     ICD-10-CM: M43.16  ICD-9-CM: 738.4     Degeneration of intervertebral disc of lumbar region with discogenic back pain and lower extremity pain     ICD-10-CM: M51.362  ICD-9-CM: 722.52     Generalized osteoarthrosis, involving multiple sites     ICD-10-CM: M15.9  ICD-9-CM: 715.09     Class 3 severe obesity due to excess calories without serious comorbidity with body mass index (BMI) of 40.0 to 44.9 in adult     ICD-10-CM: E66.813, E66.01, Z68.41  ICD-9-CM: 278.01, V85.41     History of bariatric surgery     ICD-10-CM: Z98.84  ICD-9-CM: V45.86     DDD (degenerative disc disease), lumbar     ICD-10-CM: M51.369  ICD-9-CM: 722.52     Gastroesophageal reflux disease, unspecified whether esophagitis present     ICD-10-CM: K21.9  ICD-9-CM: 530.81     CLAUDIA (generalized anxiety disorder)     ICD-10-CM: F41.1  ICD-9-CM: 300.02           I spent 30 minutes in patient care: Reviewing records prior to the visit, examining the patient, entering orders and documentation    Part of this note may be an electronic transcription/translation of spoken language to printed text using the Dragon Dictation [employer]

## 2025-06-06 ENCOUNTER — OFFICE VISIT (OUTPATIENT)
Dept: FAMILY MEDICINE CLINIC | Facility: CLINIC | Age: 75
End: 2025-06-06
Payer: MEDICARE

## 2025-06-06 VITALS
TEMPERATURE: 96.7 F | SYSTOLIC BLOOD PRESSURE: 120 MMHG | OXYGEN SATURATION: 95 % | DIASTOLIC BLOOD PRESSURE: 78 MMHG | BODY MASS INDEX: 44.76 KG/M2 | RESPIRATION RATE: 16 BRPM | WEIGHT: 228 LBS | HEIGHT: 60 IN | HEART RATE: 73 BPM

## 2025-06-06 DIAGNOSIS — E66.813 CLASS 3 SEVERE OBESITY DUE TO EXCESS CALORIES WITHOUT SERIOUS COMORBIDITY WITH BODY MASS INDEX (BMI) OF 40.0 TO 44.9 IN ADULT: ICD-10-CM

## 2025-06-06 DIAGNOSIS — M43.16 SPONDYLOLISTHESIS OF LUMBAR REGION: ICD-10-CM

## 2025-06-06 DIAGNOSIS — M54.41 CHRONIC BILATERAL LOW BACK PAIN WITH BILATERAL SCIATICA: ICD-10-CM

## 2025-06-06 DIAGNOSIS — K21.9 GASTROESOPHAGEAL REFLUX DISEASE, UNSPECIFIED WHETHER ESOPHAGITIS PRESENT: ICD-10-CM

## 2025-06-06 DIAGNOSIS — M51.369 DDD (DEGENERATIVE DISC DISEASE), LUMBAR: ICD-10-CM

## 2025-06-06 DIAGNOSIS — M15.9 GENERALIZED OSTEOARTHROSIS, INVOLVING MULTIPLE SITES: ICD-10-CM

## 2025-06-06 DIAGNOSIS — E78.00 ELEVATED CHOLESTEROL: ICD-10-CM

## 2025-06-06 DIAGNOSIS — G89.29 CHRONIC BILATERAL LOW BACK PAIN WITH BILATERAL SCIATICA: ICD-10-CM

## 2025-06-06 DIAGNOSIS — M54.42 CHRONIC BILATERAL LOW BACK PAIN WITH BILATERAL SCIATICA: ICD-10-CM

## 2025-06-06 DIAGNOSIS — Z00.00 MEDICARE ANNUAL WELLNESS VISIT, SUBSEQUENT: Primary | ICD-10-CM

## 2025-06-06 DIAGNOSIS — E66.01 CLASS 3 SEVERE OBESITY DUE TO EXCESS CALORIES WITHOUT SERIOUS COMORBIDITY WITH BODY MASS INDEX (BMI) OF 40.0 TO 44.9 IN ADULT: ICD-10-CM

## 2025-06-06 DIAGNOSIS — Z98.84 HISTORY OF BARIATRIC SURGERY: ICD-10-CM

## 2025-06-06 DIAGNOSIS — M48.062 LUMBAR STENOSIS WITH NEUROGENIC CLAUDICATION: ICD-10-CM

## 2025-06-06 DIAGNOSIS — F41.1 GAD (GENERALIZED ANXIETY DISORDER): ICD-10-CM

## 2025-06-06 DIAGNOSIS — Z51.81 MEDICATION MONITORING ENCOUNTER: ICD-10-CM

## 2025-06-06 PROCEDURE — 1126F AMNT PAIN NOTED NONE PRSNT: CPT | Performed by: FAMILY MEDICINE

## 2025-06-06 PROCEDURE — G0439 PPPS, SUBSEQ VISIT: HCPCS | Performed by: FAMILY MEDICINE

## 2025-06-06 PROCEDURE — 1160F RVW MEDS BY RX/DR IN RCRD: CPT | Performed by: FAMILY MEDICINE

## 2025-06-06 PROCEDURE — 1159F MED LIST DOCD IN RCRD: CPT | Performed by: FAMILY MEDICINE

## 2025-06-06 RX ORDER — MECLIZINE HYDROCHLORIDE 25 MG/1
25 TABLET ORAL 3 TIMES DAILY PRN
Qty: 60 TABLET | Refills: 5 | Status: SHIPPED | OUTPATIENT
Start: 2025-06-06

## 2025-06-06 RX ORDER — OMEPRAZOLE 20 MG/1
20 CAPSULE, DELAYED RELEASE ORAL DAILY
Qty: 90 CAPSULE | Refills: 3 | Status: SHIPPED | OUTPATIENT
Start: 2025-06-06

## 2025-06-06 RX ORDER — MELOXICAM 15 MG/1
15 TABLET ORAL DAILY
Qty: 90 TABLET | Refills: 3 | Status: SHIPPED | OUTPATIENT
Start: 2025-06-06

## 2025-06-06 RX ORDER — SERTRALINE HYDROCHLORIDE 25 MG/1
25 TABLET, FILM COATED ORAL DAILY
Qty: 90 TABLET | Refills: 3 | Status: SHIPPED | OUTPATIENT
Start: 2025-06-06

## 2025-06-06 RX ORDER — TRAMADOL HYDROCHLORIDE 50 MG/1
50 TABLET ORAL EVERY 6 HOURS PRN
Qty: 180 TABLET | Refills: 1 | Status: SHIPPED | OUTPATIENT
Start: 2025-06-06

## 2025-06-10 NOTE — PROGRESS NOTES
The ABCs of the Annual Wellness Visit  Subsequent Medicare Wellness Visit    Chief Complaint   Patient presents with    Medicare Wellness-subsequent      Subjective   History of Present Illness:  Casandra Trinidad is a 75 y.o. female who presents for a Subsequent Medicare Wellness Visit.  History of Present Illness  The patient is a 75-year-old female who presents today for a Medicare annual wellness visit. Additionally, follow-up is needed regarding her anxiety, hyperlipidemia, lumbar stenosis, higher body weight, chronic low back pain, history of bariatric surgery, and GERD.    She reports experiencing difficulty in ambulation and frequent falls, which she attributes to her spinal condition. Despite having undergone spinal surgery involving the insertion of rods, she continues to experience pain. A recent x-ray of her spine showed no abnormalities. She has sought chiropractic care but was advised against any manipulation of her back. Pain management includes Ultram, taken once in the morning and once at night, supplemented by Advil. This regimen has been effective in reducing her morning soreness.    Episodes of dizziness are reported, which she believes may be indicative of a vertigo flare-up. She has not experienced any associated nausea or vomiting. Ear-pulling exercises recommended by her chiropractor have resulted in ear soreness. She has been on meclizine for the past 2 weeks, which she reports as beneficial.    She reports a weight loss of 6 pounds over the past month. Additionally, she has been experiencing leg weakness and shortness of breath. She expresses a desire to avoid wheelchair use.    PAST SURGICAL HISTORY:  Bariatric surgery  Spinal surgery with insertion of rods    The following portions of the patient's history were reviewed and   updated as appropriate: allergies, current medications, past family history, past medical history, past social history, past surgical history, and problem  list.    Compared to one year ago, the patient feels her physical   health is the same.    Compared to one year ago, the patient feels her mental   health is the same.    Recent Hospitalizations:  She was not admitted to the hospital during the last year.       Current Medical Providers:  Patient Care Team:  Tony Melton MD as PCP - General (Family Medicine)  Sachin Schumacher MD PhD as Consulting Physician (Orthopedic Surgery)  Gagandeep Dc MD as Consulting Physician (Pain Medicine)  Olive Zhou APRN as Nurse Practitioner (Nurse Practitioner)  Cira Ortega MD as Consulting Physician (Obstetrics and Gynecology)  Tony Melton MD as Referring Physician (Family Medicine)    Outpatient Medications Prior to Visit   Medication Sig Dispense Refill    aspirin 81 MG EC tablet Take 1 tablet by mouth Daily.      Calcium Carbonate-Vitamin D 600-200 MG-UNIT tablet Take 1 tablet by mouth 2 (two) times a day.      Multiple Vitamins-Minerals (ICAPS AREDS 2 PO) Take 1 capsule by mouth every night.      mupirocin (BACTROBAN) 2 % ointment Apply 1 application  topically to the appropriate area as directed 3 (Three) Times a Day. 30 g 3    meloxicam (MOBIC) 15 MG tablet Take 1 tablet by mouth Daily. 90 tablet 3    omeprazole (priLOSEC) 20 MG capsule Take 1 capsule by mouth Daily. 90 capsule 3    sertraline (ZOLOFT) 25 MG tablet Take 1 tablet by mouth Daily. 90 tablet 3    tiZANidine (ZANAFLEX) 4 MG tablet Take 1 tablet by mouth At Night As Needed for Muscle Spasms. 180 tablet 3    traMADol (ULTRAM) 50 MG tablet Take 1 tablet by mouth Every 6 (Six) Hours As Needed for Moderate Pain. 180 tablet 1    tiZANidine (ZANAFLEX) 2 MG tablet Take 1 tablet by mouth 3 (Three) Times a Day. 270 tablet 3     No facility-administered medications prior to visit.       Opioid medication/s are on active medication list.  and I have evaluated her active treatment plan and pain score trends (see  "table).  Vitals:    06/06/25 1352   PainSc: 0-No pain     I have reviewed the chart for potential of high risk medication and harmful drug interactions in the elderly.          Aspirin is on active medication list. Aspirin use is indicated based on review of current medical condition/s. Pros and cons of this therapy have been discussed today. Benefits of this medication outweigh potential harm.  Patient has been encouraged to continue taking this medication.  .      Patient Active Problem List   Diagnosis    Menopause    Generalized osteoarthrosis, involving multiple sites    Esophageal reflux    CLAUDIA (generalized anxiety disorder)    Spondylosis without myelopathy or radiculopathy, lumbar region    Lumbar stenosis with neurogenic claudication    Spondylolisthesis of lumbar region    DDD (degenerative disc disease), lumbar    Morbid obesity due to excess calories    History of bilateral knee replacement    History of bariatric surgery    Physical deconditioning    PMB (postmenopausal bleeding)     Advance Care Planning  ACP discussion was declined by the patient.      Review of Systems      Objective      Vitals:    06/06/25 1352   BP: 120/78   Pulse: 73   Resp: 16   Temp: 96.7 °F (35.9 °C)   SpO2: 95%   Weight: 103 kg (228 lb)   Height: 152.4 cm (60\")   PainSc: 0-No pain     BMI Readings from Last 1 Encounters:   06/06/25 44.53 kg/m²   BMI is above normal parameters. Recommendations include: nutrition counseling    Does the patient have evidence of cognitive impairment? No    Physical Exam  Physical Exam  Neurological: Awake, alert, oriented x4, no focal deficit  Head: Normocephalic, atraumatic  Ears: External ear canals and tympanic membranes intact  Eyes: Pupils equal and round, conjunctivae clear  Nose: Septum midline, nares patent, mucosa normal  Mouth/Throat: Mucous membranes moist, no erythema, no exudate  Neck: Supple, no abnormalities  Respiratory: Clear to auscultation, no wheezing, rales or " rhonchi  Cardiovascular: Regular rate and rhythm, no murmurs, rubs, or gallops  Gastrointestinal: Soft, no tenderness, no distention, no masses  Extremities: No edema, no cyanosis  Musculoskeletal: No joint or muscular abnormalities noted  Skin: No abnormalities, no rashes or lesions       Results  Imaging   - X-ray of the lumbar spine: Normal           HEALTH RISK ASSESSMENT    Smoking Status:  Social History     Tobacco Use   Smoking Status Never   Smokeless Tobacco Never     Alcohol Consumption:  Social History     Substance and Sexual Activity   Alcohol Use No     Fall Risk Screen:    KEVINADI Fall Risk Assessment was completed, and patient is at MODERATE risk for falls. Assessment completed on:2025    Depression Screenin/6/2025     1:51 PM   PHQ-2/PHQ-9 Depression Screening   Little interest or pleasure in doing things Not at all   Feeling down, depressed, or hopeless Not at all       Health Habits and Functional and Cognitive Screenin/6/2025     1:51 PM   Functional & Cognitive Status   Do you have difficulty preparing food and eating? No   Do you have difficulty bathing yourself, getting dressed or grooming yourself? No   Do you have difficulty using the toilet? No   Do you have difficulty moving around from place to place? No   Do you have trouble with steps or getting out of a bed or a chair? No   Current Diet Well Balanced Diet   Dental Exam Up to date   Eye Exam Up to date   Exercise (times per week) 0 times per week   Current Exercises Include No Regular Exercise   Do you need help using the phone?  No   Are you deaf or do you have serious difficulty hearing?  No   Do you need help to go to places out of walking distance? No   Do you need help shopping? No   Do you need help preparing meals?  No   Do you need help with housework?  No   Do you need help with laundry? No   Do you need help taking your medications? No   Do you need help managing money? No   Do you ever drive or ride in a  car without wearing a seat belt? No   Have you felt unusual stress, anger or loneliness in the last month? No   Who do you live with? Spouse   If you need help, do you have trouble finding someone available to you? No   Have you been bothered in the last four weeks by sexual problems? No   Do you have difficulty concentrating, remembering or making decisions? No       Age-appropriate Screening Schedule:  Refer to the list below for future screening recommendations based on patient's age, sex and/or medical conditions. Orders for these recommended tests are listed in the plan section. The patient has been provided with a written plan.    Health Maintenance   Topic Date Due    DXA SCAN  Never done    RSV Vaccine - Adults (1 - 1-dose 75+ series) Never done    ANNUAL WELLNESS VISIT  04/17/2025    LIPID PANEL  05/01/2025    COVID-19 Vaccine (3 - 2024-25 season) 06/20/2025 (Originally 9/1/2024)    INFLUENZA VACCINE  07/01/2025    TDAP/TD VACCINES (2 - Td or Tdap) 02/03/2027    COLORECTAL CANCER SCREENING  05/05/2032    HEPATITIS C SCREENING  Completed    Pneumococcal Vaccine 50+  Completed    ZOSTER VACCINE  Completed    MAMMOGRAM  Discontinued              Assessment & Plan     CMS Preventative Services Quick Reference  Risk Factors Identified During Encounter  None Identified  The above risks/problems have been discussed with the patient.  Follow up actions/plans if indicated are seen below in the Assessment/Plan Section.  Pertinent information has been shared with the patient in the After Visit Summary.    Diagnoses and all orders for this visit:    1. Medicare annual wellness visit, subsequent (Primary)  -     CBC (No Diff); Future  -     Comprehensive Metabolic Panel; Future  -     Lipid Panel; Future  -     TSH; Future  -     T4, Free; Future    2. CLAUDIA (generalized anxiety disorder)  -     sertraline (ZOLOFT) 25 MG tablet; Take 1 tablet by mouth Daily.  Dispense: 90 tablet; Refill: 3    3. Medication monitoring  encounter  -     CBC (No Diff); Future  -     Comprehensive Metabolic Panel; Future  -     Lipid Panel; Future    4. Elevated cholesterol  -     Comprehensive Metabolic Panel; Future  -     Lipid Panel; Future    5. Lumbar stenosis with neurogenic claudication  -     traMADol (ULTRAM) 50 MG tablet; Take 1 tablet by mouth Every 6 (Six) Hours As Needed for Moderate Pain.  Dispense: 180 tablet; Refill: 1  -     meloxicam (MOBIC) 15 MG tablet; Take 1 tablet by mouth Daily.  Dispense: 90 tablet; Refill: 3    6. Class 3 severe obesity due to excess calories without serious comorbidity with body mass index (BMI) of 40.0 to 44.9 in adult  -     Comprehensive Metabolic Panel; Future  -     TSH; Future  -     T4, Free; Future    7. Chronic bilateral low back pain with bilateral sciatica  -     traMADol (ULTRAM) 50 MG tablet; Take 1 tablet by mouth Every 6 (Six) Hours As Needed for Moderate Pain.  Dispense: 180 tablet; Refill: 1  -     meloxicam (MOBIC) 15 MG tablet; Take 1 tablet by mouth Daily.  Dispense: 90 tablet; Refill: 3    8. Spondylolisthesis of lumbar region  -     traMADol (ULTRAM) 50 MG tablet; Take 1 tablet by mouth Every 6 (Six) Hours As Needed for Moderate Pain.  Dispense: 180 tablet; Refill: 1  -     meloxicam (MOBIC) 15 MG tablet; Take 1 tablet by mouth Daily.  Dispense: 90 tablet; Refill: 3    9. Generalized osteoarthrosis, involving multiple sites  -     traMADol (ULTRAM) 50 MG tablet; Take 1 tablet by mouth Every 6 (Six) Hours As Needed for Moderate Pain.  Dispense: 180 tablet; Refill: 1  -     meloxicam (MOBIC) 15 MG tablet; Take 1 tablet by mouth Daily.  Dispense: 90 tablet; Refill: 3    10. History of bariatric surgery  -     CBC (No Diff); Future  -     Comprehensive Metabolic Panel; Future  -     TSH; Future  -     T4, Free; Future    11. Gastroesophageal reflux disease, unspecified whether esophagitis present  -     omeprazole (priLOSEC) 20 MG capsule; Take 1 capsule by mouth Daily.  Dispense: 90  capsule; Refill: 3    12. DDD (degenerative disc disease), lumbar  -     traMADol (ULTRAM) 50 MG tablet; Take 1 tablet by mouth Every 6 (Six) Hours As Needed for Moderate Pain.  Dispense: 180 tablet; Refill: 1  -     meloxicam (MOBIC) 15 MG tablet; Take 1 tablet by mouth Daily.  Dispense: 90 tablet; Refill: 3    Other orders  -     meclizine (ANTIVERT) 25 MG tablet; Take 1 tablet by mouth 3 (Three) Times a Day As Needed for Dizziness.  Dispense: 60 tablet; Refill: 5  -     tiZANidine (ZANAFLEX) 4 MG tablet; Take 1 tablet by mouth At Night As Needed for Muscle Spasms.  Dispense: 90 tablet; Refill: 3      Assessment & Plan  1. Chronic low back pain.  - Reports persistent pain despite previous surgeries involving rods and other hardware.  - Manages pain with Ultram and Advil.  - Discussed the effectiveness of current pain management strategies.  - Prescription for Ultram will be provided to the pharmacy.    2. Vertigo.  - Experiences dizziness and near-vomiting episodes, suggesting a flare-up of vertigo.  - Physical exam findings indicate potential vertigo.  - Discussed previous use of meclizine and its effectiveness.  - Prescription for meclizine will be sent to the pharmacy.    3. Class III severe obesity.  - Reports a weight loss of 6 pounds in the last month.  - Weight and diet are being monitored.  - Discussed the importance of maintaining a balanced diet.  - Encouraged to continue monitoring weight and dietary habits.    4. Anxiety.  - Reports increased stress due to her current home situation.  - Current management strategies for anxiety were reviewed.  - Discussed the potential benefit of counseling.  - Encouraged to continue current management strategies and consider counseling if needed.    5. Hyperlipidemia.  - Advised to continue current medication regimen.  - Physical exam and history reviewed for cardiovascular risk factors.  - Discussed the importance of a heart-healthy diet.  - Lipid panel will be  ordered to monitor cholesterol levels.    6. Lumbar stenosis.  - Reports difficulty walking and frequent falls.  - Physical exam findings indicate mobility issues.  - Discussed the potential benefits of physical therapy.  - Advised to avoid strenuous activities and consider physical therapy for mobility improvement.    7. Gastroesophageal reflux disease (GERD).  - Current GERD management plan reviewed.  - Discussed the importance of dietary modifications and medication adherence.  - Advised to continue current GERD management plan, including dietary modifications and medications as previously prescribed.    8. Health maintenance.  - Blood work will be ordered to monitor overall health status.  - Discussed the importance of regular monitoring and follow-up.  - Order for blood work will be placed.  - Follow-up visit scheduled in 6 months.    Follow Up:  No follow-ups on file.     An After Visit Summary and PPPS were given to the patient.

## 2025-06-24 ENCOUNTER — TELEPHONE (OUTPATIENT)
Dept: FAMILY MEDICINE CLINIC | Facility: CLINIC | Age: 75
End: 2025-06-24
Payer: MEDICARE

## 2025-06-24 NOTE — TELEPHONE ENCOUNTER
Caller: Casandra Trinidad    Relationship: Self    Best call back number: [phone]     What orders are you requesting (i.e. lab or imaging): URINALYSIS      In what timeframe would the patient need to come in: 06/25/25    Where will you receive your lab/imaging services: [facility]    Additional notes: THE PATIENT IS HAVING BURNING WHEN URINATING , BLOOD IN THE URINE AS WELL , URGENCIES.

## 2025-06-25 ENCOUNTER — LAB (OUTPATIENT)
Dept: LAB | Facility: HOSPITAL | Age: 75
End: 2025-06-25
Payer: MEDICARE

## 2025-06-25 DIAGNOSIS — R31.9 HEMATURIA, UNSPECIFIED TYPE: ICD-10-CM

## 2025-06-25 DIAGNOSIS — Z51.81 MEDICATION MONITORING ENCOUNTER: ICD-10-CM

## 2025-06-25 DIAGNOSIS — R30.0 DYSURIA: ICD-10-CM

## 2025-06-25 DIAGNOSIS — R30.0 DYSURIA: Primary | ICD-10-CM

## 2025-06-25 DIAGNOSIS — Z98.84 HISTORY OF BARIATRIC SURGERY: ICD-10-CM

## 2025-06-25 DIAGNOSIS — Z00.00 MEDICARE ANNUAL WELLNESS VISIT, SUBSEQUENT: ICD-10-CM

## 2025-06-25 DIAGNOSIS — E78.00 ELEVATED CHOLESTEROL: ICD-10-CM

## 2025-06-25 DIAGNOSIS — E66.813 CLASS 3 SEVERE OBESITY DUE TO EXCESS CALORIES WITHOUT SERIOUS COMORBIDITY WITH BODY MASS INDEX (BMI) OF 40.0 TO 44.9 IN ADULT: ICD-10-CM

## 2025-06-25 LAB
ALBUMIN SERPL-MCNC: 3.9 G/DL (ref 3.5–5.2)
ALBUMIN/GLOB SERPL: 1.1 G/DL
ALP SERPL-CCNC: 102 U/L (ref 39–117)
ALT SERPL W P-5'-P-CCNC: 17 U/L (ref 1–33)
ANION GAP SERPL CALCULATED.3IONS-SCNC: 12 MMOL/L (ref 5–15)
AST SERPL-CCNC: 30 U/L (ref 1–32)
BACTERIA UR QL AUTO: ABNORMAL /HPF
BILIRUB SERPL-MCNC: 0.5 MG/DL (ref 0–1.2)
BILIRUB UR QL STRIP: NEGATIVE
BUN SERPL-MCNC: 15 MG/DL (ref 8–23)
BUN/CREAT SERPL: 17.9 (ref 7–25)
CALCIUM SPEC-SCNC: 9.6 MG/DL (ref 8.6–10.5)
CHLORIDE SERPL-SCNC: 103 MMOL/L (ref 98–107)
CHOLEST SERPL-MCNC: 152 MG/DL (ref 0–200)
CLARITY UR: ABNORMAL
CO2 SERPL-SCNC: 27 MMOL/L (ref 22–29)
COLOR UR: YELLOW
CREAT SERPL-MCNC: 0.84 MG/DL (ref 0.57–1)
DEPRECATED RDW RBC AUTO: 42.7 FL (ref 37–54)
EGFRCR SERPLBLD CKD-EPI 2021: 72.6 ML/MIN/1.73
ERYTHROCYTE [DISTWIDTH] IN BLOOD BY AUTOMATED COUNT: 13.4 % (ref 12.3–15.4)
GLOBULIN UR ELPH-MCNC: 3.4 GM/DL
GLUCOSE SERPL-MCNC: 87 MG/DL (ref 65–99)
GLUCOSE UR STRIP-MCNC: NEGATIVE MG/DL
HCT VFR BLD AUTO: 41.5 % (ref 34–46.6)
HDLC SERPL-MCNC: 67 MG/DL (ref 40–60)
HGB BLD-MCNC: 13.3 G/DL (ref 12–15.9)
HGB UR QL STRIP.AUTO: ABNORMAL
HOLD SPECIMEN: NORMAL
HYALINE CASTS UR QL AUTO: ABNORMAL /LPF
KETONES UR QL STRIP: NEGATIVE
LDLC SERPL CALC-MCNC: 73 MG/DL (ref 0–100)
LDLC/HDLC SERPL: 1.09 {RATIO}
LEUKOCYTE ESTERASE UR QL STRIP.AUTO: ABNORMAL
MCH RBC QN AUTO: 28.2 PG (ref 26.6–33)
MCHC RBC AUTO-ENTMCNC: 32 G/DL (ref 31.5–35.7)
MCV RBC AUTO: 88.1 FL (ref 79–97)
NITRITE UR QL STRIP: POSITIVE
PH UR STRIP.AUTO: 8.5 [PH] (ref 5–8)
PLATELET # BLD AUTO: 243 10*3/MM3 (ref 140–450)
PMV BLD AUTO: 11.3 FL (ref 6–12)
POTASSIUM SERPL-SCNC: 5 MMOL/L (ref 3.5–5.2)
PROT SERPL-MCNC: 7.3 G/DL (ref 6–8.5)
PROT UR QL STRIP: ABNORMAL
RBC # BLD AUTO: 4.71 10*6/MM3 (ref 3.77–5.28)
RBC # UR STRIP: ABNORMAL /HPF
REF LAB TEST METHOD: ABNORMAL
SODIUM SERPL-SCNC: 142 MMOL/L (ref 136–145)
SP GR UR STRIP: 1.02 (ref 1–1.03)
SQUAMOUS #/AREA URNS HPF: ABNORMAL /HPF
T4 FREE SERPL-MCNC: 1.51 NG/DL (ref 0.92–1.68)
TRIGL SERPL-MCNC: 60 MG/DL (ref 0–150)
TSH SERPL DL<=0.05 MIU/L-ACNC: 2.58 UIU/ML (ref 0.27–4.2)
UROBILINOGEN UR QL STRIP: ABNORMAL
VLDLC SERPL-MCNC: 12 MG/DL (ref 5–40)
WBC # UR STRIP: ABNORMAL /HPF
WBC NRBC COR # BLD AUTO: 9.33 10*3/MM3 (ref 3.4–10.8)

## 2025-06-25 PROCEDURE — 80053 COMPREHEN METABOLIC PANEL: CPT

## 2025-06-25 PROCEDURE — 85027 COMPLETE CBC AUTOMATED: CPT

## 2025-06-25 PROCEDURE — 36415 COLL VENOUS BLD VENIPUNCTURE: CPT

## 2025-06-25 PROCEDURE — 81001 URINALYSIS AUTO W/SCOPE: CPT

## 2025-06-25 PROCEDURE — 84443 ASSAY THYROID STIM HORMONE: CPT

## 2025-06-25 PROCEDURE — 87186 SC STD MICRODIL/AGAR DIL: CPT

## 2025-06-25 PROCEDURE — 84439 ASSAY OF FREE THYROXINE: CPT

## 2025-06-25 PROCEDURE — 87086 URINE CULTURE/COLONY COUNT: CPT

## 2025-06-25 PROCEDURE — 87088 URINE BACTERIA CULTURE: CPT

## 2025-06-25 PROCEDURE — 80061 LIPID PANEL: CPT

## 2025-06-26 ENCOUNTER — RESULTS FOLLOW-UP (OUTPATIENT)
Dept: LAB | Facility: HOSPITAL | Age: 75
End: 2025-06-26
Payer: MEDICARE

## 2025-06-26 ENCOUNTER — TELEPHONE (OUTPATIENT)
Dept: FAMILY MEDICINE CLINIC | Facility: CLINIC | Age: 75
End: 2025-06-26
Payer: MEDICARE

## 2025-06-26 RX ORDER — DOXYCYCLINE 100 MG/1
100 CAPSULE ORAL 2 TIMES DAILY
Qty: 20 CAPSULE | Refills: 0 | Status: SHIPPED | OUTPATIENT
Start: 2025-06-26 | End: 2025-07-06

## 2025-06-26 NOTE — LETTER
Casandra GAB Amos  [address] [place] [id number]    June 26, 2025     Dear Ms. Trinidad:    Below are the results from your recent visit:    Resulted Orders   CBC (No Diff)   Result Value Ref Range    WBC 9.33 3.40 - 10.80 10*3/mm3    RBC 4.71 3.77 - 5.28 10*6/mm3    Hemoglobin 13.3 12.0 - 15.9 g/dL    Hematocrit 41.5 34.0 - 46.6 %    MCV 88.1 79.0 - 97.0 fL    MCH 28.2 26.6 - 33.0 pg    MCHC 32.0 31.5 - 35.7 g/dL    RDW 13.4 12.3 - 15.4 %    RDW-SD 42.7 37.0 - 54.0 fl    MPV 11.3 6.0 - 12.0 fL    Platelets 243 140 - 450 10*3/mm3   Comprehensive Metabolic Panel   Result Value Ref Range    Glucose 87 65 - 99 mg/dL    BUN 15.0 8.0 - 23.0 mg/dL    Creatinine 0.84 0.57 - 1.00 mg/dL    Sodium 142 136 - 145 mmol/L    Potassium 5.0 3.5 - 5.2 mmol/L    Chloride 103 98 - 107 mmol/L    CO2 27.0 22.0 - 29.0 mmol/L    Calcium 9.6 8.6 - 10.5 mg/dL    Total Protein 7.3 6.0 - 8.5 g/dL    Albumin 3.9 3.5 - 5.2 g/dL    ALT (SGPT) 17 1 - 33 U/L    AST (SGOT) 30 1 - 32 U/L    Alkaline Phosphatase 102 39 - 117 U/L    Total Bilirubin 0.5 0.0 - 1.2 mg/dL    Globulin 3.4 gm/dL    A/G Ratio 1.1 g/dL    BUN/Creatinine Ratio 17.9 7.0 - 25.0    Anion Gap 12.0 5.0 - 15.0 mmol/L    eGFR 72.6 >60.0 mL/min/1.73   Lipid Panel   Result Value Ref Range    Total Cholesterol 152 0 - 200 mg/dL    Triglycerides 60 0 - 150 mg/dL    HDL Cholesterol 67 (H) 40 - 60 mg/dL    LDL Cholesterol  73 0 - 100 mg/dL    VLDL Cholesterol 12 5 - 40 mg/dL    LDL/HDL Ratio 1.09    TSH   Result Value Ref Range    TSH 2.580 0.270 - 4.200 uIU/mL   T4, Free   Result Value Ref Range    Free T4 1.51 0.92 - 1.68 ng/dL   Urinalysis With Culture If Indicated - Urine, Clean Catch   Result Value Ref Range    Color, UA Yellow Yellow, Straw    Appearance, UA Cloudy (A) Clear    pH, UA 8.5 (H) 5.0 - 8.0    Specific Gravity, UA 1.018 1.005 - 1.030    Glucose, UA Negative Negative    Ketones, UA Negative Negative    Bilirubin, UA Negative Negative    Blood, UA Moderate (2+) (A)  Negative    Protein,  mg/dL (2+) (A) Negative    Leuk Esterase, UA Moderate (2+) (A) Negative    Nitrite, UA Positive (A) Negative    Urobilinogen, UA 1.0 E.U./dL 0.2 - 1.0 E.U./dL   [place] Urine Culture Tube - Urine, Clean Catch   Result Value Ref Range    Extra Tube Hold for add-ons.       Comment:      Auto resulted.   Urinalysis, Microscopic Only - Urine, Clean Catch   Result Value Ref Range    RBC, UA Too Numerous to Count (A) None Seen, 0-2 /HPF    WBC, UA Too Numerous to Count (A) None Seen, 0-2 /HPF    Bacteria, UA 4+ (A) None Seen /HPF    Squamous Epithelial Cells, UA 3-6 (A) None Seen, 0-2 /HPF    Hyaline Casts, UA 0-2 None Seen /LPF    Methodology Automated Microscopy        All of your lab results from blood testing appear in the normal or acceptable range.    If you have any questions or concerns, please don't hesitate to call.         Sincerely,        SOURAV Melton MD

## 2025-06-26 NOTE — TELEPHONE ENCOUNTER
Definitely appears to be an infection.  Culture and sensitivities are pending.  I will go ahead and start her on some antibiotics and hopefully we can get her some relief.

## 2025-06-26 NOTE — TELEPHONE ENCOUNTER
Caller: Casandra Trinidad    Relationship: Self    Best call back number: [phone]     Caller requesting test results: SELF     What test was performed: URINALYSIS     When was the test performed: 6/25/25    Where was the test performed: [facility] LOCATION     Additional notes: PATIENT HAS BLOOD IN HER URINE, HURTING, BURNING WHEN URINATES, FREQUENT URINATION     [id number] [place], [place] - [address] AT [facility] [address] - [phone] [facility] [phone]

## 2025-06-28 LAB — BACTERIA SPEC AEROBE CULT: ABNORMAL

## 2025-07-07 ENCOUNTER — TELEPHONE (OUTPATIENT)
Dept: FAMILY MEDICINE CLINIC | Facility: CLINIC | Age: 75
End: 2025-07-07
Payer: MEDICARE

## 2025-07-07 RX ORDER — NITROFURANTOIN 25; 75 MG/1; MG/1
100 CAPSULE ORAL 2 TIMES DAILY
Qty: 20 CAPSULE | Refills: 0 | Status: SHIPPED | OUTPATIENT
Start: 2025-07-07

## 2025-07-07 NOTE — TELEPHONE ENCOUNTER
Hub staff attempted to follow warm transfer process and was unsuccessful     Caller: Casandra Trinidad    Relationship to patient: Self    Best call back number: [phone]    Patient is needing: PATIENT HAS COMPLETED HER COURSE OF MEDICATION FOR THE UTI. HER BURNING IS DOWN A BIT, AND SHE STILL HAS SOME SLIGHT URGENCY, SHE IS WANTING PCP TO KNOW IN CASE HE IS WANTING TO SEND HER IN SOME MORE MEDICATION

## (undated) DEVICE — ADHS LIQ MASTISOL 2/3ML

## (undated) DEVICE — DRSNG WND GZ PAD BORDERED 4X8IN STRL

## (undated) DEVICE — IRRIGATOR BULB ASEPTO 60CC STRL

## (undated) DEVICE — GLV SURG PREMIERPRO MIC LTX PF SZ6.5 BRN

## (undated) DEVICE — ANTIBACTERIAL UNDYED BRAIDED (POLYGLACTIN 910), SYNTHETIC ABSORBABLE SUTURE: Brand: COATED VICRYL

## (undated) DEVICE — HOLDER: Brand: DEROYAL

## (undated) DEVICE — Device

## (undated) DEVICE — 3M™ STERI-DRAPE™ INSTRUMENT POUCH 1018: Brand: STERI-DRAPE™

## (undated) DEVICE — TOOL 14MH30 LEGEND 14CM 3MM: Brand: MIDAS REX ™

## (undated) DEVICE — GLV SURG PREMIERPRO MIC LTX PF SZ7.5 BRN

## (undated) DEVICE — PACK,UNIVERSAL,NO GOWNS: Brand: MEDLINE

## (undated) DEVICE — TRAP,MUCUS SPECIMEN,40CC: Brand: MEDLINE

## (undated) DEVICE — SHEET,DRAPE,40X58,STERILE: Brand: MEDLINE

## (undated) DEVICE — GOWN,REINF,POLY,ECL,PP SLV,XL: Brand: MEDLINE

## (undated) DEVICE — DRAPE,TOP,102X53,STERILE: Brand: MEDLINE

## (undated) DEVICE — PK NEURO DISC 10

## (undated) DEVICE — 3M™ STERI-STRIP™ REINFORCED ADHESIVE SKIN CLOSURES, R1547, 1/2 IN X 4 IN (12 MM X 100 MM), 6 STRIPS/ENVELOPE: Brand: 3M™ STERI-STRIP™

## (undated) DEVICE — CVR HNDL LT SURG ACCSSRY BLU STRL

## (undated) DEVICE — SPHR MARKR STEALTH STATION

## (undated) DEVICE — SUT VIC 0 CTD BR 18IN UNDYE VCP724D

## (undated) DEVICE — 3M™ WARMING BLANKET, UPPER BODY, 10 PER CASE, 42268: Brand: BAIR HUGGER™

## (undated) DEVICE — ACCY PA700 LUBRICANT DIFFUSER MR7 4 PACK: Brand: MIDAS REX

## (undated) DEVICE — SNAP KOVER: Brand: UNBRANDED

## (undated) DEVICE — ELECTRD BLD EXT EDGE/INSUL 1P 4IN

## (undated) DEVICE — SUT ETHLN 3/0 FS1 30IN 669H

## (undated) DEVICE — SUT VIC PLS CTD ANTIB BR 3/0 8/18IN 45CM

## (undated) DEVICE — SUT ETHLN 2/0 PS 18IN 585H

## (undated) DEVICE — KT DRN EVAC WND PVC PCH WTROC RND 10F400